# Patient Record
Sex: FEMALE | Race: WHITE | NOT HISPANIC OR LATINO | Employment: OTHER | URBAN - METROPOLITAN AREA
[De-identification: names, ages, dates, MRNs, and addresses within clinical notes are randomized per-mention and may not be internally consistent; named-entity substitution may affect disease eponyms.]

---

## 2017-02-07 DIAGNOSIS — E78.5 HYPERLIPIDEMIA: ICD-10-CM

## 2017-02-13 ENCOUNTER — ALLSCRIPTS OFFICE VISIT (OUTPATIENT)
Dept: OTHER | Facility: OTHER | Age: 62
End: 2017-02-13

## 2017-02-13 LAB — S PYO AG THROAT QL: POSITIVE

## 2017-02-24 ENCOUNTER — ALLSCRIPTS OFFICE VISIT (OUTPATIENT)
Dept: OTHER | Facility: OTHER | Age: 62
End: 2017-02-24

## 2017-03-15 ENCOUNTER — ALLSCRIPTS OFFICE VISIT (OUTPATIENT)
Dept: OTHER | Facility: OTHER | Age: 62
End: 2017-03-15

## 2017-04-05 ENCOUNTER — ALLSCRIPTS OFFICE VISIT (OUTPATIENT)
Dept: OTHER | Facility: OTHER | Age: 62
End: 2017-04-05

## 2017-04-05 DIAGNOSIS — N23 RENAL COLIC: ICD-10-CM

## 2017-04-05 DIAGNOSIS — J02.0 STREPTOCOCCAL PHARYNGITIS: ICD-10-CM

## 2017-04-05 DIAGNOSIS — E04.1 NONTOXIC SINGLE THYROID NODULE: ICD-10-CM

## 2017-04-05 DIAGNOSIS — E78.49 OTHER HYPERLIPIDEMIA: ICD-10-CM

## 2017-04-05 DIAGNOSIS — E87.6 HYPOKALEMIA: ICD-10-CM

## 2017-04-05 DIAGNOSIS — E78.5 HYPERLIPIDEMIA: ICD-10-CM

## 2017-04-05 DIAGNOSIS — H69.93 DISORDER OF BOTH EUSTACHIAN TUBES: ICD-10-CM

## 2017-04-05 LAB — S PYO AG THROAT QL: POSITIVE

## 2017-04-12 ENCOUNTER — GENERIC CONVERSION - ENCOUNTER (OUTPATIENT)
Dept: OTHER | Facility: OTHER | Age: 62
End: 2017-04-12

## 2017-04-13 ENCOUNTER — LAB REQUISITION (OUTPATIENT)
Dept: LAB | Facility: HOSPITAL | Age: 62
End: 2017-04-13
Payer: COMMERCIAL

## 2017-04-13 DIAGNOSIS — J03.90 ACUTE TONSILLITIS: ICD-10-CM

## 2017-04-13 PROCEDURE — 87070 CULTURE OTHR SPECIMN AEROBIC: CPT | Performed by: PHYSICIAN ASSISTANT

## 2017-04-15 LAB — BACTERIA THROAT CULT: NORMAL

## 2017-04-25 ENCOUNTER — GENERIC CONVERSION - ENCOUNTER (OUTPATIENT)
Dept: OTHER | Facility: OTHER | Age: 62
End: 2017-04-25

## 2017-04-27 ENCOUNTER — APPOINTMENT (OUTPATIENT)
Dept: LAB | Facility: CLINIC | Age: 62
End: 2017-04-27
Payer: COMMERCIAL

## 2017-04-27 ENCOUNTER — GENERIC CONVERSION - ENCOUNTER (OUTPATIENT)
Dept: OTHER | Facility: OTHER | Age: 62
End: 2017-04-27

## 2017-04-27 DIAGNOSIS — E78.5 HYPERLIPIDEMIA: ICD-10-CM

## 2017-04-27 DIAGNOSIS — N23 RENAL COLIC: ICD-10-CM

## 2017-04-27 DIAGNOSIS — E78.49 OTHER HYPERLIPIDEMIA: ICD-10-CM

## 2017-04-27 DIAGNOSIS — E04.1 NONTOXIC SINGLE THYROID NODULE: ICD-10-CM

## 2017-04-27 LAB
ALBUMIN SERPL BCP-MCNC: 4 G/DL (ref 3.5–5)
ALP SERPL-CCNC: 63 U/L (ref 46–116)
ALT SERPL W P-5'-P-CCNC: 25 U/L (ref 12–78)
ANION GAP SERPL CALCULATED.3IONS-SCNC: 9 MMOL/L (ref 4–13)
AST SERPL W P-5'-P-CCNC: 14 U/L (ref 5–45)
BASOPHILS # BLD MANUAL: 0 THOUSAND/UL (ref 0–0.1)
BASOPHILS NFR MAR MANUAL: 0 % (ref 0–1)
BILIRUB SERPL-MCNC: 1.39 MG/DL (ref 0.2–1)
BUN SERPL-MCNC: 14 MG/DL (ref 5–25)
CALCIUM SERPL-MCNC: 9.4 MG/DL (ref 8.3–10.1)
CHLORIDE SERPL-SCNC: 101 MMOL/L (ref 100–108)
CHOLEST SERPL-MCNC: 226 MG/DL (ref 50–200)
CO2 SERPL-SCNC: 28 MMOL/L (ref 21–32)
CREAT SERPL-MCNC: 0.82 MG/DL (ref 0.6–1.3)
EOSINOPHIL # BLD MANUAL: 0.07 THOUSAND/UL (ref 0–0.4)
EOSINOPHIL NFR BLD MANUAL: 1 % (ref 0–6)
ERYTHROCYTE [DISTWIDTH] IN BLOOD BY AUTOMATED COUNT: 14 % (ref 11.6–15.1)
GFR SERPL CREATININE-BSD FRML MDRD: >60 ML/MIN/1.73SQ M
GLUCOSE P FAST SERPL-MCNC: 93 MG/DL (ref 65–99)
HCT VFR BLD AUTO: 39.8 % (ref 34.8–46.1)
HDLC SERPL-MCNC: 69 MG/DL (ref 40–60)
HGB BLD-MCNC: 13.2 G/DL (ref 11.5–15.4)
LDLC SERPL CALC-MCNC: 130 MG/DL (ref 0–100)
LYMPHOCYTES # BLD AUTO: 1.85 THOUSAND/UL (ref 0.6–4.47)
LYMPHOCYTES # BLD AUTO: 25 % (ref 14–44)
MCH RBC QN AUTO: 29.7 PG (ref 26.8–34.3)
MCHC RBC AUTO-ENTMCNC: 33.2 G/DL (ref 31.4–37.4)
MCV RBC AUTO: 90 FL (ref 82–98)
MONOCYTES # BLD AUTO: 1.03 THOUSAND/UL (ref 0–1.22)
MONOCYTES NFR BLD: 14 % (ref 4–12)
NEUTROPHILS # BLD MANUAL: 4.43 THOUSAND/UL (ref 1.85–7.62)
NEUTS BAND NFR BLD MANUAL: 1 % (ref 0–8)
NEUTS SEG NFR BLD AUTO: 59 % (ref 43–75)
NRBC BLD AUTO-RTO: 0 /100 WBCS
PLATELET # BLD AUTO: 305 THOUSANDS/UL (ref 149–390)
PLATELET BLD QL SMEAR: ADEQUATE
PMV BLD AUTO: 10.7 FL (ref 8.9–12.7)
POTASSIUM SERPL-SCNC: 3.4 MMOL/L (ref 3.5–5.3)
PROT SERPL-MCNC: 7.7 G/DL (ref 6.4–8.2)
PTH-INTACT SERPL-MCNC: 48.4 PG/ML (ref 14–72)
RBC # BLD AUTO: 4.44 MILLION/UL (ref 3.81–5.12)
RBC MORPH BLD: NORMAL
SODIUM SERPL-SCNC: 138 MMOL/L (ref 136–145)
TRIGL SERPL-MCNC: 133 MG/DL
TSH SERPL DL<=0.05 MIU/L-ACNC: 1.74 UIU/ML (ref 0.36–3.74)
WBC # BLD AUTO: 7.39 THOUSAND/UL (ref 4.31–10.16)

## 2017-04-27 PROCEDURE — 84443 ASSAY THYROID STIM HORMONE: CPT

## 2017-04-27 PROCEDURE — 85027 COMPLETE CBC AUTOMATED: CPT

## 2017-04-27 PROCEDURE — 80053 COMPREHEN METABOLIC PANEL: CPT

## 2017-04-27 PROCEDURE — 36415 COLL VENOUS BLD VENIPUNCTURE: CPT

## 2017-04-27 PROCEDURE — 85007 BL SMEAR W/DIFF WBC COUNT: CPT

## 2017-04-27 PROCEDURE — 80061 LIPID PANEL: CPT

## 2017-04-27 PROCEDURE — 83970 ASSAY OF PARATHORMONE: CPT

## 2017-05-01 ENCOUNTER — ALLSCRIPTS OFFICE VISIT (OUTPATIENT)
Dept: OTHER | Facility: OTHER | Age: 62
End: 2017-05-01

## 2017-05-01 ENCOUNTER — LAB REQUISITION (OUTPATIENT)
Dept: LAB | Facility: HOSPITAL | Age: 62
End: 2017-05-01
Payer: COMMERCIAL

## 2017-05-01 ENCOUNTER — TRANSCRIBE ORDERS (OUTPATIENT)
Dept: ADMINISTRATIVE | Facility: HOSPITAL | Age: 62
End: 2017-05-01

## 2017-05-01 DIAGNOSIS — N20.0 URIC ACID NEPHROLITHIASIS: Primary | ICD-10-CM

## 2017-05-01 DIAGNOSIS — N20.0 CALCULUS OF KIDNEY: ICD-10-CM

## 2017-05-01 DIAGNOSIS — N23 RENAL COLIC: ICD-10-CM

## 2017-05-01 LAB
CLARITY UR: NORMAL
COLOR UR: YELLOW
GLUCOSE (HISTORICAL): NORMAL
HGB UR QL STRIP.AUTO: NORMAL
KETONES UR STRIP-MCNC: NORMAL MG/DL
LEUKOCYTE ESTERASE UR QL STRIP: NORMAL
NITRITE UR QL STRIP: NORMAL
PH UR STRIP.AUTO: 5 [PH]
PROT UR STRIP-MCNC: NORMAL MG/DL
SP GR UR STRIP.AUTO: 1.01

## 2017-05-01 PROCEDURE — 87086 URINE CULTURE/COLONY COUNT: CPT | Performed by: UROLOGY

## 2017-05-02 LAB — BACTERIA UR CULT: NORMAL

## 2017-05-05 ENCOUNTER — GENERIC CONVERSION - ENCOUNTER (OUTPATIENT)
Dept: OTHER | Facility: OTHER | Age: 62
End: 2017-05-05

## 2017-05-05 ENCOUNTER — APPOINTMENT (OUTPATIENT)
Dept: LAB | Facility: CLINIC | Age: 62
End: 2017-05-05
Payer: COMMERCIAL

## 2017-05-05 DIAGNOSIS — E87.6 HYPOKALEMIA: ICD-10-CM

## 2017-05-05 LAB — POTASSIUM SERPL-SCNC: 4.1 MMOL/L (ref 3.5–5.3)

## 2017-05-05 PROCEDURE — 36415 COLL VENOUS BLD VENIPUNCTURE: CPT

## 2017-05-05 PROCEDURE — 84132 ASSAY OF SERUM POTASSIUM: CPT

## 2017-05-08 ENCOUNTER — HOSPITAL ENCOUNTER (OUTPATIENT)
Dept: CT IMAGING | Facility: HOSPITAL | Age: 62
Discharge: HOME | End: 2017-05-08
Attending: UROLOGY
Payer: COMMERCIAL

## 2017-05-08 DIAGNOSIS — N20.0 CALCULUS OF KIDNEY: ICD-10-CM

## 2017-05-08 DIAGNOSIS — N20.0 URIC ACID NEPHROLITHIASIS: ICD-10-CM

## 2017-05-08 DIAGNOSIS — Z12.31 ENCOUNTER FOR SCREENING MAMMOGRAM FOR MALIGNANT NEOPLASM OF BREAST: ICD-10-CM

## 2017-05-08 PROCEDURE — 74176 CT ABD & PELVIS W/O CONTRAST: CPT

## 2017-05-09 ENCOUNTER — ALLSCRIPTS OFFICE VISIT (OUTPATIENT)
Dept: OTHER | Facility: OTHER | Age: 62
End: 2017-05-09

## 2017-06-19 ENCOUNTER — HOSPITAL ENCOUNTER (OUTPATIENT)
Dept: RADIOLOGY | Facility: HOSPITAL | Age: 62
Discharge: HOME | End: 2017-06-19
Attending: ORTHOPAEDIC SURGERY
Payer: COMMERCIAL

## 2017-06-19 ENCOUNTER — ALLSCRIPTS OFFICE VISIT (OUTPATIENT)
Dept: OTHER | Facility: OTHER | Age: 62
End: 2017-06-19

## 2017-06-19 DIAGNOSIS — M54.50 LOW BACK PAIN: ICD-10-CM

## 2017-06-19 PROCEDURE — 72100 X-RAY EXAM L-S SPINE 2/3 VWS: CPT

## 2017-06-23 ENCOUNTER — GENERIC CONVERSION - ENCOUNTER (OUTPATIENT)
Dept: OTHER | Facility: OTHER | Age: 62
End: 2017-06-23

## 2017-06-23 ENCOUNTER — APPOINTMENT (OUTPATIENT)
Dept: PHYSICAL THERAPY | Facility: CLINIC | Age: 62
End: 2017-06-23
Payer: COMMERCIAL

## 2017-06-23 DIAGNOSIS — M54.50 LOW BACK PAIN: ICD-10-CM

## 2017-06-23 PROCEDURE — 97162 PT EVAL MOD COMPLEX 30 MIN: CPT

## 2017-06-27 ENCOUNTER — APPOINTMENT (OUTPATIENT)
Dept: PHYSICAL THERAPY | Facility: CLINIC | Age: 62
End: 2017-06-27
Payer: COMMERCIAL

## 2017-06-27 PROCEDURE — 97110 THERAPEUTIC EXERCISES: CPT

## 2017-06-29 ENCOUNTER — APPOINTMENT (OUTPATIENT)
Dept: PHYSICAL THERAPY | Facility: CLINIC | Age: 62
End: 2017-06-29
Payer: COMMERCIAL

## 2017-06-29 PROCEDURE — 97110 THERAPEUTIC EXERCISES: CPT

## 2017-06-29 PROCEDURE — 97140 MANUAL THERAPY 1/> REGIONS: CPT

## 2017-06-29 PROCEDURE — 97112 NEUROMUSCULAR REEDUCATION: CPT

## 2017-07-05 ENCOUNTER — APPOINTMENT (OUTPATIENT)
Dept: PHYSICAL THERAPY | Facility: CLINIC | Age: 62
End: 2017-07-05
Payer: COMMERCIAL

## 2017-07-05 PROCEDURE — 97150 GROUP THERAPEUTIC PROCEDURES: CPT

## 2017-07-05 PROCEDURE — 97110 THERAPEUTIC EXERCISES: CPT

## 2017-07-05 PROCEDURE — 97140 MANUAL THERAPY 1/> REGIONS: CPT

## 2017-07-07 ENCOUNTER — APPOINTMENT (OUTPATIENT)
Dept: PHYSICAL THERAPY | Facility: CLINIC | Age: 62
End: 2017-07-07
Payer: COMMERCIAL

## 2017-07-07 PROCEDURE — 97112 NEUROMUSCULAR REEDUCATION: CPT

## 2017-07-07 PROCEDURE — 97140 MANUAL THERAPY 1/> REGIONS: CPT

## 2017-07-07 PROCEDURE — 97110 THERAPEUTIC EXERCISES: CPT

## 2017-07-10 ENCOUNTER — APPOINTMENT (OUTPATIENT)
Dept: PHYSICAL THERAPY | Facility: CLINIC | Age: 62
End: 2017-07-10
Payer: COMMERCIAL

## 2017-07-10 PROCEDURE — 97110 THERAPEUTIC EXERCISES: CPT

## 2017-07-10 PROCEDURE — 97140 MANUAL THERAPY 1/> REGIONS: CPT

## 2017-07-13 ENCOUNTER — APPOINTMENT (OUTPATIENT)
Dept: PHYSICAL THERAPY | Facility: CLINIC | Age: 62
End: 2017-07-13
Payer: COMMERCIAL

## 2017-07-13 PROCEDURE — 97010 HOT OR COLD PACKS THERAPY: CPT

## 2017-07-13 PROCEDURE — 97110 THERAPEUTIC EXERCISES: CPT

## 2017-07-13 PROCEDURE — 97140 MANUAL THERAPY 1/> REGIONS: CPT

## 2017-07-17 ENCOUNTER — APPOINTMENT (OUTPATIENT)
Dept: PHYSICAL THERAPY | Facility: CLINIC | Age: 62
End: 2017-07-17
Payer: COMMERCIAL

## 2017-07-17 PROCEDURE — 97140 MANUAL THERAPY 1/> REGIONS: CPT

## 2017-07-17 PROCEDURE — 97110 THERAPEUTIC EXERCISES: CPT

## 2017-07-20 ENCOUNTER — APPOINTMENT (OUTPATIENT)
Dept: PHYSICAL THERAPY | Facility: CLINIC | Age: 62
End: 2017-07-20
Payer: COMMERCIAL

## 2017-07-20 PROCEDURE — 97110 THERAPEUTIC EXERCISES: CPT

## 2017-07-20 PROCEDURE — 97140 MANUAL THERAPY 1/> REGIONS: CPT

## 2017-07-24 ENCOUNTER — GENERIC CONVERSION - ENCOUNTER (OUTPATIENT)
Dept: OTHER | Facility: OTHER | Age: 62
End: 2017-07-24

## 2017-07-24 ENCOUNTER — APPOINTMENT (OUTPATIENT)
Dept: PHYSICAL THERAPY | Facility: CLINIC | Age: 62
End: 2017-07-24
Payer: COMMERCIAL

## 2017-07-24 PROCEDURE — 97110 THERAPEUTIC EXERCISES: CPT

## 2017-07-24 PROCEDURE — 97140 MANUAL THERAPY 1/> REGIONS: CPT

## 2017-07-27 ENCOUNTER — APPOINTMENT (OUTPATIENT)
Dept: PHYSICAL THERAPY | Facility: CLINIC | Age: 62
End: 2017-07-27
Payer: COMMERCIAL

## 2017-07-31 ENCOUNTER — APPOINTMENT (OUTPATIENT)
Dept: PHYSICAL THERAPY | Facility: CLINIC | Age: 62
End: 2017-07-31
Payer: COMMERCIAL

## 2017-07-31 ENCOUNTER — TRANSCRIBE ORDERS (OUTPATIENT)
Dept: ADMINISTRATIVE | Facility: HOSPITAL | Age: 62
End: 2017-07-31

## 2017-07-31 ENCOUNTER — ALLSCRIPTS OFFICE VISIT (OUTPATIENT)
Dept: OTHER | Facility: OTHER | Age: 62
End: 2017-07-31

## 2017-07-31 DIAGNOSIS — M45.1 ANKYLOSING SPONDYLITIS OF OCCIPITO-ATLANTO-AXIAL REGION (HCC): Primary | ICD-10-CM

## 2017-08-08 ENCOUNTER — HOSPITAL ENCOUNTER (OUTPATIENT)
Dept: RADIOLOGY | Facility: HOSPITAL | Age: 62
Discharge: HOME | End: 2017-08-08
Attending: ORTHOPAEDIC SURGERY | Admitting: RADIOLOGY
Payer: COMMERCIAL

## 2017-08-08 DIAGNOSIS — M45.1 ANKYLOSING SPONDYLITIS OF OCCIPITO-ATLANTO-AXIAL REGION (HCC): ICD-10-CM

## 2017-08-08 PROCEDURE — G0260 INJ FOR SACROILIAC JT ANESTH: HCPCS

## 2017-08-08 RX ORDER — LIDOCAINE HYDROCHLORIDE 10 MG/ML
5 INJECTION, SOLUTION EPIDURAL; INFILTRATION; INTRACAUDAL; PERINEURAL ONCE
Status: CANCELLED | OUTPATIENT
Start: 2017-08-08 | End: 2017-08-08

## 2017-08-08 RX ORDER — BUPIVACAINE HYDROCHLORIDE 2.5 MG/ML
20 INJECTION, SOLUTION EPIDURAL; INFILTRATION; INTRACAUDAL ONCE
Status: CANCELLED | OUTPATIENT
Start: 2017-08-08 | End: 2017-08-08

## 2017-08-08 RX ORDER — METHYLPREDNISOLONE ACETATE 80 MG/ML
80 INJECTION, SUSPENSION INTRA-ARTICULAR; INTRALESIONAL; INTRAMUSCULAR; SOFT TISSUE ONCE
Status: CANCELLED | OUTPATIENT
Start: 2017-08-08 | End: 2017-08-08

## 2017-08-17 ENCOUNTER — ALLSCRIPTS OFFICE VISIT (OUTPATIENT)
Dept: OTHER | Facility: OTHER | Age: 62
End: 2017-08-17

## 2017-08-17 LAB — S PYO AG THROAT QL: POSITIVE

## 2017-09-12 ENCOUNTER — GENERIC CONVERSION - ENCOUNTER (OUTPATIENT)
Dept: OTHER | Facility: OTHER | Age: 62
End: 2017-09-12

## 2017-09-14 ENCOUNTER — ALLSCRIPTS OFFICE VISIT (OUTPATIENT)
Dept: OTHER | Facility: OTHER | Age: 62
End: 2017-09-14

## 2017-09-14 ENCOUNTER — TRANSCRIBE ORDERS (OUTPATIENT)
Dept: ADMINISTRATIVE | Facility: HOSPITAL | Age: 62
End: 2017-09-14

## 2017-09-14 DIAGNOSIS — M54.50 LOW BACK PAIN: ICD-10-CM

## 2017-09-14 DIAGNOSIS — G89.29 CHRONIC IDIOPATHIC ANAL PAIN: ICD-10-CM

## 2017-09-14 DIAGNOSIS — K62.89 CHRONIC IDIOPATHIC ANAL PAIN: ICD-10-CM

## 2017-09-14 DIAGNOSIS — M54.50 ACUTE RIGHT-SIDED LOW BACK PAIN WITHOUT SCIATICA: Primary | ICD-10-CM

## 2017-09-26 ENCOUNTER — HOSPITAL ENCOUNTER (OUTPATIENT)
Dept: MRI IMAGING | Facility: HOSPITAL | Age: 62
Discharge: HOME | End: 2017-09-26
Attending: ORTHOPAEDIC SURGERY
Payer: COMMERCIAL

## 2017-09-26 ENCOUNTER — GENERIC CONVERSION - ENCOUNTER (OUTPATIENT)
Dept: OTHER | Facility: OTHER | Age: 62
End: 2017-09-26

## 2017-09-26 DIAGNOSIS — M54.50 LOW BACK PAIN: ICD-10-CM

## 2017-09-26 PROCEDURE — 72148 MRI LUMBAR SPINE W/O DYE: CPT

## 2017-10-11 ENCOUNTER — GENERIC CONVERSION - ENCOUNTER (OUTPATIENT)
Dept: OTHER | Facility: OTHER | Age: 62
End: 2017-10-11

## 2017-11-01 DIAGNOSIS — N39.0 URINARY TRACT INFECTION: ICD-10-CM

## 2017-11-01 DIAGNOSIS — N20.0 CALCULUS OF KIDNEY: ICD-10-CM

## 2017-11-01 DIAGNOSIS — N23 RENAL COLIC: ICD-10-CM

## 2017-11-03 ENCOUNTER — HOSPITAL ENCOUNTER (OUTPATIENT)
Dept: ULTRASOUND IMAGING | Facility: HOSPITAL | Age: 62
Discharge: HOME | End: 2017-11-03
Payer: COMMERCIAL

## 2017-11-03 DIAGNOSIS — N20.0 CALCULUS OF KIDNEY: ICD-10-CM

## 2017-11-03 PROCEDURE — 76770 US EXAM ABDO BACK WALL COMP: CPT

## 2017-11-20 ENCOUNTER — HOSPITAL ENCOUNTER (OUTPATIENT)
Dept: MAMMOGRAPHY | Facility: HOSPITAL | Age: 62
Discharge: HOME | End: 2017-11-20
Attending: OBSTETRICS & GYNECOLOGY
Payer: COMMERCIAL

## 2017-11-20 DIAGNOSIS — Z12.31 ENCOUNTER FOR SCREENING MAMMOGRAM FOR MALIGNANT NEOPLASM OF BREAST: ICD-10-CM

## 2017-11-20 PROCEDURE — G0202 SCR MAMMO BI INCL CAD: HCPCS

## 2017-11-20 PROCEDURE — 77063 BREAST TOMOSYNTHESIS BI: CPT

## 2017-11-21 ENCOUNTER — APPOINTMENT (OUTPATIENT)
Dept: LAB | Facility: HOSPITAL | Age: 62
End: 2017-11-21
Attending: UROLOGY
Payer: COMMERCIAL

## 2017-11-21 ENCOUNTER — ALLSCRIPTS OFFICE VISIT (OUTPATIENT)
Dept: OTHER | Facility: OTHER | Age: 62
End: 2017-11-21

## 2017-11-21 ENCOUNTER — TRANSCRIBE ORDERS (OUTPATIENT)
Dept: ADMINISTRATIVE | Facility: HOSPITAL | Age: 62
End: 2017-11-21

## 2017-11-21 ENCOUNTER — GENERIC CONVERSION - ENCOUNTER (OUTPATIENT)
Dept: OTHER | Facility: OTHER | Age: 62
End: 2017-11-21

## 2017-11-21 DIAGNOSIS — N23 RENAL COLIC: ICD-10-CM

## 2017-11-21 DIAGNOSIS — N39.0 URINARY TRACT INFECTION: ICD-10-CM

## 2017-11-21 DIAGNOSIS — N20.0 CALCULUS OF KIDNEY: ICD-10-CM

## 2017-11-21 DIAGNOSIS — N20.0 CALCULUS OF KIDNEY: Primary | ICD-10-CM

## 2017-11-21 LAB
BACTERIA UR QL AUTO: ABNORMAL /HPF
BILIRUB UR QL STRIP: NEGATIVE
CLARITY UR: ABNORMAL
CLARITY UR: NORMAL
COLOR UR: YELLOW
COLOR UR: YELLOW
GLUCOSE (HISTORICAL): NORMAL
GLUCOSE UR STRIP-MCNC: NEGATIVE MG/DL
HGB UR QL STRIP.AUTO: ABNORMAL
HGB UR QL STRIP.AUTO: NORMAL
HYALINE CASTS #/AREA URNS LPF: ABNORMAL /LPF
KETONES UR STRIP-MCNC: NEGATIVE MG/DL
KETONES UR STRIP-MCNC: NORMAL MG/DL
LEUKOCYTE ESTERASE UR QL STRIP: ABNORMAL
LEUKOCYTE ESTERASE UR QL STRIP: NORMAL
NITRITE UR QL STRIP: NEGATIVE
NITRITE UR QL STRIP: NORMAL
NON-SQ EPI CELLS URNS QL MICRO: ABNORMAL /HPF
PH UR STRIP.AUTO: 6.5 [PH]
PH UR STRIP.AUTO: 6.5 [PH] (ref 4.5–8)
PROT UR STRIP-MCNC: NEGATIVE MG/DL
PROT UR STRIP-MCNC: NORMAL MG/DL
RBC #/AREA URNS AUTO: ABNORMAL /HPF
SP GR UR STRIP.AUTO: 1.01
SP GR UR STRIP.AUTO: 1.02 (ref 1–1.03)
UROBILINOGEN UR QL STRIP.AUTO: 0.2 E.U./DL
WBC #/AREA URNS AUTO: ABNORMAL /HPF

## 2017-11-21 PROCEDURE — 81001 URINALYSIS AUTO W/SCOPE: CPT

## 2017-11-21 PROCEDURE — 87186 SC STD MICRODIL/AGAR DIL: CPT

## 2017-11-21 PROCEDURE — 87077 CULTURE AEROBIC IDENTIFY: CPT

## 2017-11-21 PROCEDURE — 87086 URINE CULTURE/COLONY COUNT: CPT

## 2017-11-23 LAB — BACTERIA UR CULT: ABNORMAL

## 2017-11-27 ENCOUNTER — GENERIC CONVERSION - ENCOUNTER (OUTPATIENT)
Dept: OTHER | Facility: OTHER | Age: 62
End: 2017-11-27

## 2017-11-29 ENCOUNTER — GENERIC CONVERSION - ENCOUNTER (OUTPATIENT)
Dept: OTHER | Facility: OTHER | Age: 62
End: 2017-11-29

## 2018-01-11 NOTE — RESULT NOTES
Message   Matilda- Stable mammogram- repeat in 1 year  Verified Results  MAMMO SCREENING BILATERAL W 3D & CAD [id number] 12:32PM [address] Order Number: [id number]   TW Order Number: [id number]   TW Order Number: [id number]    - Patient Instructions: To schedule this appointment, please contact Central Scheduling at [phone]  Do not wear any perfume, powder, lotion or deodorant on breast or underarm area  Please bring your doctors order, referral (if needed) and insurance information with you on the day of the test  Failure to bring this information may result in this test being rescheduled  Arrive 15 minutes prior to your appointment time to register  On the day of your test, please bring any prior mammogram or breast studies with you that were not performed at a [facility]  Failure to bring prior exams may result in your test needing to be rescheduled  Test Name Result Flag Reference   MAMMO SCREENING BILATERAL W 3D & CAD (Report)     Patient History:   Patient is postmenopausal    Family history of unknown cancer in mother at age 66, breast    cancer in maternal aunt at age 76, unknown cancer in maternal    grandmother at age 76, and breast cancer in sister at age 47  Taking unspecified hormones for 12 years  Patient has never smoked  Patient's BMI is 21 3  Reason for exam: screening (asymptomatic)  Screening     Mammo Screening Bilateral W DBT and CAD: September 22, 2016 -    Check In #: [de-identified]   2D/3D Procedure   3D views: Bilateral MLO view(s) were taken  2D views: Bilateral CC view(s) were taken  Technologist: MICAELA Estrella (MICAELA)(M)   Prior study comparison: July 9, 2015, bilateral 3D yohan mammo,    performed at [facility]  March 26, 2014, mammo    screening bilateral W CAD, performed at [facility]  March 20, 2013, mammo screening bilateral W CAD, performed at    [facility]   March 7, 2012, mammo screening    bilateral W CAD, performed at [facility]  March 4, 2011, mammo diagnostic bilateral W CAD, performed at [facility]  July 21, 2010, mammo diagnostic bilateral W CAD,   performed at [facility]  January 26, 2010, mammo    diagnostic bilateral W CAD, performed at [facility] [place]  January 19, 2010, mammo screening bilateral W CAD,    performed at [facility]  January 16, 2009, mammo    screening bilateral W CAD, performed at [facility]  January 7, 2008, mammo screening bilateral W CAD, performed at    [facility]  November 29, 2006, mammo screening    bilateral W CAD, performed at [facility]  There are scattered fibroglandular densities  Bilateral digital    mammography is performed  No dominant soft tissue mass,    architectural distortion or suspicious calcifications are noted  The skin and nipple contours are within normal limits  Scattered benign appearing calcifications are noted  Left breast   nodule is unchanged  No evidence of malignancy  No significant    changes when compared to prior studies  1  No evidence of malignancy  2  No significant change when compared with the prior study  ASSESSMENT: BiRad:2 - Benign     Recommendation:   Routine screening mammogram of both breasts in 1 year  A reminder letter will be scheduled   8-10% of cancers will be missed on mammography  Management of a    palpable abnormality must be based on clinical grounds  Patients    will be notified of their results via letter from our facility  Accredited by [employer] of [employer] and FDA       Transcription Location: MICAELA Hernández 98: [id number]     Risk Value(s):   Tyrer-Cuzick 10 Year: 5 935%, Tyrer-Cuzick Lifetime: 13 553%,    Myriad Table: 1 5%, HERMINIO 5 Year: 2 9%, NCI Lifetime: 12 8%   Signed by:   Romeo Carrizales MD   9/23/16

## 2018-01-11 NOTE — RESULT NOTES
Verified Results  (1) POTASSIUM [id number] 08:58AM Nereida Seaman    Order Number: [id number]     Test Name Result Flag Reference   POTASSIUM 4 1 mmol/L  3 5-5 3

## 2018-01-11 NOTE — RESULT NOTES
Verified Results  * MRI LUMBAR SPINE WO CONTRAST [id number] 07:16AM Jaskaran Campos Order Number: [id number]   Performing Comments: [facility]   [address] [facility]   [place]   9-26-17 @@ 7:30AM   PLEASE ARRIVE 10-15 MINUTES PRIOR TO TEST  - Patient Instructions: To schedule this appointment, please contact Central Scheduling at [phone]  Test Name Result Flag Reference   MRI LUMBAR SPINE WO CONTRAST (Report)     MRI LUMBAR SPINE WITHOUT CONTRAST     INDICATION: Low back pain  COMPARISON: None  TECHNIQUE: Sagittal T1, sagittal T2, sagittal inversion recovery, axial T1 and axial T2, coronal T2       IMAGE QUALITY: Diagnostic     FINDINGS:     ALIGNMENT: Normal alignment of the lumbar spine  No compression fracture  No spondylolysis or spondylolisthesis  No scoliosis  MARROW SIGNAL: Hemangioma within the L2 vertebral body  DISTAL CORD AND CONUS: Normal size and signal within the distal cord and conus  The conus ends at the L1 level  PARASPINAL SOFT TISSUES: Paraspinal soft tissues are unremarkable  SACRUM: Normal signal within the sacrum  No evidence of insufficiency or stress fracture  LOWER THORACIC DISC SPACES: Normal disc height and signal  No disc herniation, canal stenosis or foraminal narrowing  LUMBAR DISC SPACES:        L1-L2: Normal      L2-L3: Normal      L3-L4: Normal      L4-L5: Mild annular bulging  Slight facet degenerative change with trace effusions  No canal stenosis  No foraminal narrowing  L5-S1: Slight loss of disc height  Mild annular bulging and facet arthropathy  No disc herniation, canal stenosis or foraminal narrowing  IMPRESSION:     Mild noncompressive lumbar degenerative disc disease and facet arthropathy at L4-5 and L5-S1  No disc herniation, canal stenosis or foraminal narrowing         Workstation performed: [id number]     Signed by:   Merlin Cornwall, DO   9/26/17

## 2018-01-12 VITALS
HEART RATE: 76 BPM | TEMPERATURE: 98.4 F | SYSTOLIC BLOOD PRESSURE: 110 MMHG | HEIGHT: 64 IN | DIASTOLIC BLOOD PRESSURE: 74 MMHG | RESPIRATION RATE: 16 BRPM | BODY MASS INDEX: 22.16 KG/M2 | WEIGHT: 129.8 LBS

## 2018-01-13 VITALS
WEIGHT: 133 LBS | HEART RATE: 74 BPM | BODY MASS INDEX: 22.71 KG/M2 | HEIGHT: 64 IN | SYSTOLIC BLOOD PRESSURE: 110 MMHG | DIASTOLIC BLOOD PRESSURE: 78 MMHG

## 2018-01-13 VITALS
DIASTOLIC BLOOD PRESSURE: 90 MMHG | HEIGHT: 63 IN | WEIGHT: 128 LBS | SYSTOLIC BLOOD PRESSURE: 130 MMHG | RESPIRATION RATE: 16 BRPM | TEMPERATURE: 98 F | HEART RATE: 70 BPM | BODY MASS INDEX: 22.68 KG/M2

## 2018-01-13 NOTE — RESULT NOTES
Message   Matilda - DXA is low risk for fracture- repeat in 2 years  Verified Results  * DXA BONE DENSITY SPINE HIP AND PELVIS 22Sep2016 12:33PM Vidya Shaw   TW Order Number: [id number]   TW Order Number: [id number]     Test Name Result Flag Reference   DXA BONE DENSITY SPINE HIP AND PELVIS (Report)     CENTRAL DXA SCAN     CLINICAL HISTORY:  56year old post-menopausal  female risk factors include postmenopausal, estrogen deficiency  TECHNIQUE: Bone densitometry was performed using a [employer]'s W bone densitometer  Regions of interest appear properly placed  There are no obvious fractures or other confounding variables which could limit the study  None     COMPARISON: Follow-up     RESULTS:    LUMBAR SPINE: L1-L4:   BMD 0 839 gm/cm2   T-score below normal, -1 9   Z-score -0 3     LEFT TOTAL HIP:   BMD 0 811 gm/cm2   T-score below normal, -1 1   Z-score 0 0     LEFT FEMORAL NECK:   BMD 0 683 gm/cm2   T-score below normal, -1 5   Z-score -0 1     The Frax score in this patient identifies the risk of a major osteoporotic fracture in the next 10 years at 7 9% and a hip fracture risk of 0 8%  IMPRESSION:   1  Based on the [facility] classification, this study identifies moderate spine and femoral neck osteopenia with mild total hip osteopenia and the patient is considered at current low risk for fracture  2  A daily intake of calcium of at least 1200 mg and vitamin D, 800-1000 IU, as well as weight bearing and muscle strengthening exercise, fall prevention and avoidance of tobacco and excessive alcohol intake as basic preventive measures are recommended  3  Repeat DXA scan on the same equipment in 18-24 months as clinically indicated        WHO CLASSIFICATION:   Normal (a T-score of -1 0 or higher)   Low bone mineral density (a T-score of less than -1 0 but higher than -2 5)   Osteoporosis (a T-score of -2 5 or less)   Severe osteoporosis (a T-score of -2 5 or less with a fragility fracture)      Thank you for allowing us the opportunity to participate in your patient care  The expanded DEXA report will no longer be arriving in your mail  If you desire to view the full report please contact [address] [place] [address] or access the PACS system         Workstation performed: [id number]     Signed by:   Adriana Avalos MD   9/24/16

## 2018-01-13 NOTE — MISCELLANEOUS
Message     Date: 27 Nov 2017 9:54 AM EST, Recorded By: Meera Home For: Odalis Burrell, Self   Phone: [phone] (Mobile Phone)   Returned call from patient, she called for results of urine culture from 11/21/17,  reivewed results and patient history with DR Zach Watts, if patient is asymptomatic do not recommend treating culture with antibiotic  I called and spoke to patient, she is currenlty not having signs of infection, no fever or chills or urinary complaints  Advised culture had some bacteria and because she is asymptomatic it is not recommended to treat with anbiotic   Call if develops any urinary symptoms or concerns  Wendi Nation RN 11/27/17        Active Problems    1  Acute non-recurrent sinusitis, unspecified location (461 9) (J01 90)   2  Anxiety (300 00) (F41 9)   3  Atrophic vaginitis (627 3) (N95 2)   4  Chronic right-sided low back pain without sciatica (724 2,338 29) (M54 5,G89 29)   5  Encounter for routine pelvic examination (V72 31) (Z01 419)   6  Eustachian tube disorder, bilateral (381 9) (H69 93)   7  History of breast cancer in female (V10 3) (Z85 3)   8  History of parotidectomy (V15 29) (Z90 49)   9  Hyperlipidemia (272 4) (E78 5)   10  Hypokalemia (276 8) (E87 6)   11  Insomnia, controlled (780 52) (G47 00)   12  Denied: History of Mental disorder   13  MVP (mitral valve prolapse) (424 0) (I34 1)   14  Nephrolithiasis (592 0) (N20 0)   15  Osteopenia (733 90) (M85 80)   16  Other hyperlipidemia (272 4) (E78 4)   17  Renal colic (748 1) (A77)   18  Sacroiliitis (720 2) (M46 1)   19  Sorethroat (462) (J02 9)   20  Strep pharyngitis (034 0) (J02 0)   21  Denied: History of Substance abuse   22  Thyroid nodule (241 0) (E04 1)   23  UTI (urinary tract infection) (599 0) (N39 0)   24  Visit for screening mammogram (V76 12) (Z12 31)   25  Well adult exam (V70 0) (Z00 00)    Current Meds   1  Centrum Silver Adult 50+ TABS Recorded   2  DiazePAM 5 MG Oral Tablet; 1 PO PRN ANXIETY - DO NOT DRIVE OR TAKE ALCOHOL   WITH THIS MEDICATION; Last Rx:53Hqt4509 Ordered   3  Estrace 0 1 MG/GM Vaginal Cream; APPLY 1 GM WEEKLY; Therapy: 74FVZ4308 to (YRTPLKWU:78SUQ7228)  Requested for: 04GYY3931; Last   NA:98VWG8586 Ordered   4  Probiotic CAPS; Therapy: (Recorded:04May2016) to Recorded   5  Replens GEL; Therapy: (Recorded:04May2016) to Recorded   6  Simvastatin 40 MG Oral Tablet; Take 1 tablet daily  Requested for: 25Apr2017; Last   Rx:63Sxr1611 Ordered   7  Vitamin D 1000 UNIT Oral Tablet Recorded   8  Zolpidem Tartrate 10 MG Oral Tablet; 1/2-1 TAB AT HS FOR INSOMNIA-DO NOT   COMBINE WITH ANY OTHER MEDICATIONS; Therapy: 25Apr2017 to (Evaluate:12Ful9525); Last Rx:25Apr2017 Ordered    Allergies    1  Augmentin TABS   2   [address]    Signatures   Electronically signed by : Kashif Patel MD; Nov 28 2017  8:24AM EST                       (Author)

## 2018-01-14 VITALS
DIASTOLIC BLOOD PRESSURE: 84 MMHG | HEART RATE: 62 BPM | WEIGHT: 131 LBS | SYSTOLIC BLOOD PRESSURE: 131 MMHG | BODY MASS INDEX: 23.21 KG/M2

## 2018-01-14 VITALS
DIASTOLIC BLOOD PRESSURE: 77 MMHG | WEIGHT: 129 LBS | SYSTOLIC BLOOD PRESSURE: 124 MMHG | BODY MASS INDEX: 22.85 KG/M2 | HEART RATE: 72 BPM

## 2018-01-14 VITALS
DIASTOLIC BLOOD PRESSURE: 81 MMHG | HEART RATE: 85 BPM | WEIGHT: 131 LBS | BODY MASS INDEX: 23.21 KG/M2 | HEIGHT: 63 IN | SYSTOLIC BLOOD PRESSURE: 132 MMHG

## 2018-01-14 VITALS
BODY MASS INDEX: 23.21 KG/M2 | DIASTOLIC BLOOD PRESSURE: 82 MMHG | WEIGHT: 131 LBS | SYSTOLIC BLOOD PRESSURE: 148 MMHG | HEIGHT: 63 IN

## 2018-01-14 VITALS
BODY MASS INDEX: 22.5 KG/M2 | WEIGHT: 131.8 LBS | HEART RATE: 72 BPM | SYSTOLIC BLOOD PRESSURE: 134 MMHG | HEIGHT: 64 IN | DIASTOLIC BLOOD PRESSURE: 94 MMHG | RESPIRATION RATE: 18 BRPM | TEMPERATURE: 97.6 F

## 2018-01-15 VITALS
RESPIRATION RATE: 16 BRPM | HEIGHT: 64 IN | DIASTOLIC BLOOD PRESSURE: 78 MMHG | BODY MASS INDEX: 22.07 KG/M2 | SYSTOLIC BLOOD PRESSURE: 122 MMHG | HEART RATE: 86 BPM | WEIGHT: 129.25 LBS | TEMPERATURE: 98.2 F

## 2018-01-15 VITALS
HEIGHT: 64 IN | TEMPERATURE: 98.1 F | BODY MASS INDEX: 22.36 KG/M2 | DIASTOLIC BLOOD PRESSURE: 64 MMHG | RESPIRATION RATE: 16 BRPM | WEIGHT: 131 LBS | SYSTOLIC BLOOD PRESSURE: 110 MMHG | HEART RATE: 76 BPM

## 2018-01-15 NOTE — MISCELLANEOUS
Message     Recorded as Task   Date: 11/29/2017 11:00 AM, Created By: Oscar Chau   Task Name: Follow Up   Assigned To: Usha Juarez   Regarding Patient: Steffen [place], Status: Active   Comment:    Usha Juarez - 29 Nov 2017 11:00 AM     TASK CREATED  Caller: Self; [phone] (Mobile Phone)  Patient left message she has history of kidney stones and she would like to take Biotin for her hair and nails and she would like to know if Biotin can cause kidney stones and if it is ok to take  Usha Juarez - 29 Nov 2017 11:07 AM     TASK EDITED  Reviewed with MYLA Lester,  ok to take Biotin, not aware of any contraindication,  I called and informed patient  Maximo Campuzano RN        Active Problems    1  Acute non-recurrent sinusitis, unspecified location (461 9) (J01 90)   2  Anxiety (300 00) (F41 9)   3  Atrophic vaginitis (627 3) (N95 2)   4  Chronic right-sided low back pain without sciatica (724 2,338 29) (M54 5,G89 29)   5  Encounter for routine pelvic examination (V72 31) (Z01 419)   6  Eustachian tube disorder, bilateral (381 9) (H69 93)   7  History of breast cancer in female (V10 3) (Z85 3)   8  History of parotidectomy (V15 29) (Z90 49)   9  Hyperlipidemia (272 4) (E78 5)   10  Hypokalemia (276 8) (E87 6)   11  Insomnia, controlled (780 52) (G47 00)   12  Denied: History of Mental disorder   13  MVP (mitral valve prolapse) (424 0) (I34 1)   14  Nephrolithiasis (592 0) (N20 0)   15  Osteopenia (733 90) (M85 80)   16  Other hyperlipidemia (272 4) (E78 4)   17  Renal colic (449 5) (X38)   18  Sacroiliitis (720 2) (M46 1)   19  Sorethroat (462) (J02 9)   20  Strep pharyngitis (034 0) (J02 0)   21  Denied: History of Substance abuse   22  Thyroid nodule (241 0) (E04 1)   23  UTI (urinary tract infection) (599 0) (N39 0)   24  Visit for screening mammogram (V76 12) (Z12 31)   25  Well adult exam (V70 0) (Z00 00)    Current Meds   1  Centrum Silver Adult 50+ TABS Recorded   2   DiazePAM 5 MG Oral Tablet; 1 PO PRN ANXIETY - DO NOT DRIVE OR TAKE ALCOHOL   WITH THIS MEDICATION; Last Rx:55Wlm3353 Ordered   3  Estrace 0 1 MG/GM Vaginal Cream; APPLY 1 GM WEEKLY; Therapy: 75OCV5199 to (TVQIFJKB:88RFL0051)  Requested for: 01CUA6436; Last   MELANIE:64MOG3528 Ordered   4  Probiotic CAPS; Therapy: (Recorded:04May2016) to Recorded   5  Replens GEL; Therapy: (Recorded:04May2016) to Recorded   6  Simvastatin 40 MG Oral Tablet; Take 1 tablet daily  Requested for: 25Apr2017; Last   Rx:24Aef3511 Ordered   7  Vitamin D 1000 UNIT Oral Tablet Recorded   8  Zolpidem Tartrate 10 MG Oral Tablet; 1/2-1 TAB AT HS FOR INSOMNIA-DO NOT   COMBINE WITH ANY OTHER MEDICATIONS; Therapy: 25Apr2017 to (Evaluate:93Klm8782); Last Rx:13Qnp0736 Ordered    Allergies    1  Augmentin TABS   2   Macrobid CAPS    Signatures   Electronically signed by : Viri Dupree, [facility]; Nov 29 2017  4:01PM EST                       (Author)

## 2018-01-15 NOTE — RESULT NOTES
Message   PLS SEND RESULT LETTER     Verified Results  (1) CBC/PLT/DIFF [id number] 09:16AM Teri Tamayo     Test Name Result Flag Reference   WBC 5 6 x10E3/uL  3 4-10 8   RBC 4 74 x10E6/uL  3 77-5 28   Hemoglobin 13 7 g/dL  11 1-15 9   Hematocrit 41 5 %  34 0-46  6   MCV 88 fL  79-97   MCH 28 9 pg  26 6-33 0   MCHC 33 0 g/dL  31 5-35 7   RDW 13 9 %  12 3-15 4   Platelets 906 A43J0/PA  150-379   Neutrophils 59 %     Lymphs 29 %     Monocytes 9 %     Eos 2 %     Basos 1 %     Neutrophils (Absolute) 3 3 x10E3/uL  1 4-7 0   Lymphs (Absolute) 1 6 x10E3/uL  0 7-3 1   Monocytes(Absolute) 0 5 x10E3/uL  0 1-0 9   Eos (Absolute) 0 1 x10E3/uL  0 0-0 4   Baso (Absolute) 0 0 x10E3/uL  0 0-0 2   Immature Granulocytes 0 %     Immature Grans (Abs) 0 0 x10E3/uL  0 0-0 1     (1) COMPREHENSIVE METABOLIC PANEL [id number] 09:27ZF Teri Tamayo     Test Name Result Flag Reference   Glucose, Serum 94 mg/dL  65-99   BUN 16 mg/dL  8-27   Creatinine, Serum 0 73 mg/dL  0 57-1 00   eGFR If NonAfricn Am 89 mL/min/1 73  >59   eGFR If Africn Am 102 mL/min/1 73  >59   BUN/Creatinine Ratio 22  11-26   Sodium, Serum 141 mmol/L  134-144   Potassium, Serum 3 8 mmol/L  3 5-5 2   Chloride, Serum 104 mmol/L     Carbon Dioxide, Total 21 mmol/L  18-29   Calcium, Serum 9 7 mg/dL  8 7-10 3   Protein, Total, Serum 7 0 g/dL  6 0-8 5   Albumin, Serum 4 8 g/dL  3 6-4 8   Globulin, Total 2 2 g/dL  1 5-4 5   A/G Ratio 2 2  1 1-2 5   Bilirubin, Total 0 5 mg/dL  0 0-1 2   Alkaline Phosphatase, S 61 IU/L     AST (SGOT) 19 IU/L  0-40   ALT (SGPT) 15 IU/L  0-32     (1) LIPID PANEL, FASTING [id number] 09:16AM Teri Tamayo     Test Name Result Flag Reference   Cholesterol, Total 205 mg/dL H 100-199   Triglycerides 78 mg/dL  0-149   HDL Cholesterol 79 mg/dL  >39   According to ATP-III Guidelines, HDL-C >59 mg/dL is considered a  negative risk factor for CHD     VLDL Cholesterol Raheel 16 mg/dL  5-40   LDL Cholesterol Calc 110 mg/dL H 0-99 T  Chol/HDL Ratio 2 6 ratio units  0 0-4 4   T  Chol/HDL Ratio                                                             Men  Women                                               1/2 Avg  Risk  3 4    3 3                                                   Avg Risk  5 0    4 4                                                2X Avg  Risk  9 6    7 1                                                3X Avg  Risk 23 4   11 0     (1) TSH [id number] 09:16AM Nivia Victoria     Test Name Result Flag Reference   TSH 2 230 uIU/mL  0 450-4 500     [facility]) Yoon Stephanmarks CMP14 Default [id number] 09:16AM Nivia Victoria     Test Name Result Flag Reference   [facility] Default Comment     A hand-written panel/profile was received from your office  In  accordance with the [facility] Ambiguous Test Code Policy dated July 0130, we have completed your order by using the closest currently  or formerly recognized AMA panel  We have assigned Comprehensive  Metabolic Panel (14), Test Code #398767 to this request   If this  is not the testing you wished to receive on this specimen, please  contact the [facility] Client Inquiry/Technical Services Department  to clarify the test order  We appreciate your business  Discussion/Summary   LABS ARE NORMAL

## 2018-01-15 NOTE — RESULT NOTES
Message   Stable labs     Verified Results  (1) COMPREHENSIVE METABOLIC PANEL [id number] 10:17WJ Lewis Breaker     Test Name Result Flag Reference   Glucose, Serum 94 mg/dL  65-99   BUN 21 mg/dL  8-27   Creatinine, Serum 0 79 mg/dL  0 57-1 00   eGFR If NonAfricn Am 80 mL/min/1 73  >59   eGFR If Africn Am 92 mL/min/1 73  >59   BUN/Creatinine Ratio 27 H 11-26   Sodium, Serum 138 mmol/L  136-144   **Effective December 12, 2016 the reference interval**                   for Sodium, Serum will be changing to:                                                             134 - 144   Potassium, Serum 4 2 mmol/L  3 5-5 2   Chloride, Serum 96 mmol/L L    **Effective December 12, 2016 the reference interval**                   for Chloride, Serum will be changing to:                                                              96 - 106   Carbon Dioxide, Total 24 mmol/L  18-29   Calcium, Serum 9 7 mg/dL  8 7-10 3   Protein, Total, Serum 7 2 g/dL  6 0-8 5   Albumin, Serum 4 7 g/dL  3 6-4 8   Globulin, Total 2 5 g/dL  1 5-4 5   A/G Ratio 1 9  1 1-2 5   Bilirubin, Total 0 4 mg/dL  0 0-1 2   Alkaline Phosphatase, S 60 IU/L     AST (SGOT) 17 IU/L  0-40   ALT (SGPT) 11 IU/L  0-32

## 2018-01-16 NOTE — RESULT NOTES
Verified Results  US THYROID 17Jun2016 08:26AM [facility] Order Number: [id number]     Test Name Result Flag Reference   US THYROID (Report)     THYROID ULTRASOUND     INDICATION: Evaluate thyroid nodules  Patient there was prior history of thyroid biopsy with benign results  COMPARISON: None  TECHNIQUE:  Ultrasound of the thyroid was performed with a high frequency linear transducer in transverse and sagittal planes including volumetric imaging sweeps as well as traditional still imaging technique  FINDINGS:   Thyroid parenchyma is diffusely heterogeneous in echotexture with focal nodule(s) as described below  Right gland: 5 0 x 1 7 x 2 1 cm  Right upper pole  1 0 x 1 4 x 1 1 cm  Solid isoechoic nodule  Smooth, well defined margins  No calcifications  Nodule is not taller than it is wide  No priors for comparison  Right lower pole  1 6 x 1 7 x 1 6 cm  Partially cystic nodule with solid hypoechoic component, without eccentric solid nodule  Smooth, well defined margins  No calcifications  Nodule is not taller than it is wide  No priors for comparison  Left gland: 3 9 x 1 1 x 1 3 cm  No discrete left-sided thyroid nodules  Isthmus: 0 3 cm in AP dimension  No discrete isthmic nodules  IMPRESSION:   Diffusely heterogeneous thyroid gland with discrete thyroid nodules described above  No nodule meets current American Thyroid Association criteria for requiring biopsy  If prior outside imaging exists, it would be helpful to compare to assess for    stability  Reference: 2015 American Thyroid Association Management Guidelines for Adult Patients with Thyroid Nodules and Differentiated Thyroid Cancer  Thyroid, Volume 26, Number 1, 2016         Workstation performed: [id number]     Signed by:   Brenna Chavarria MD   6/20/16

## 2018-01-18 NOTE — RESULT NOTES
Verified Results  US THYROID 17Jun2016 08:26AM Kaci Paget Order Number: [id number]     Test Name Result Flag Reference   US THYROID (Report)     [facility]  Previous ultrasound described 3 separate right-sided thyroid nodules, while only 2 are seen today  The right lower pole nodule is unchanged based on prior report measurement is given  It is difficult to determine whether the currently seen right upper   pole nodule has changed because previously 2 different nodules were described in the region  Despite this uncertainty, because the right thyroid upper pole nodule currently does not meet criteria for requiring biopsy, continued follow-up rather than    biopsy is reasonable  Previously described left thyroid midpole nodule is no longer visible  THYROID ULTRASOUND     INDICATION: Evaluate thyroid nodules  Patient there was prior history of thyroid biopsy with benign results  COMPARISON: None  TECHNIQUE:  Ultrasound of the thyroid was performed with a high frequency linear transducer in transverse and sagittal planes including volumetric imaging sweeps as well as traditional still imaging technique  FINDINGS:   Thyroid parenchyma is diffusely heterogeneous in echotexture with focal nodule(s) as described below  Right gland: 5 0 x 1 7 x 2 1 cm  Right upper pole  1 0 x 1 4 x 1 1 cm  Solid isoechoic nodule  Smooth, well defined margins  No calcifications  Nodule is not taller than it is wide  No priors for comparison  Right lower pole  1 6 x 1 7 x 1 6 cm  Partially cystic nodule with solid hypoechoic component, without eccentric solid nodule  Smooth, well defined margins  No calcifications  Nodule is not taller than it is wide  No priors for comparison  Left gland: 3 9 x 1 1 x 1 3 cm  No discrete left-sided thyroid nodules         Isthmus: 0 3 cm in AP dimension  No discrete isthmic nodules  IMPRESSION:   Diffusely heterogeneous thyroid gland with discrete thyroid nodules described above  No nodule meets current American Thyroid Association criteria for requiring biopsy  If prior outside imaging exists, it would be helpful to compare to assess for    stability  Reference: 2015 American Thyroid Association Management Guidelines for Adult Patients with Thyroid Nodules and Differentiated Thyroid Cancer  Thyroid, Volume 26, Number 1, 2016         Workstation performed: [id number]     Signed by:   Brenna Chavarria MD   6/20/16

## 2018-01-18 NOTE — RESULT NOTES
Verified Results  (1) PTH N-TERMINAL (INTACT) 27Apr2017 08:30AM Genevie January TW Order Number: [id number]     Test Name Result Flag Reference   PARATHYROID HORMONE INTACT 48 4 pg/mL  14 0-72 0     (1) COMPREHENSIVE METABOLIC PANEL [id number] 19:68CQ Virginia Murillo Order Number: [id number]     Test Name Result Flag Reference   SODIUM 138 mmol/L  136-145   POTASSIUM 3 4 mmol/L L 3 5-5 3   CHLORIDE 101 mmol/L  100-108   CARBON DIOXIDE 28 mmol/L  21-32   ANION GAP (CALC) 9 mmol/L  4-13   BLOOD UREA NITROGEN 14 mg/dL  5-25   CREATININE 0 82 mg/dL  0 60-1 30   Standardized to IDMS reference method   CALCIUM 9 4 mg/dL  8 3-10 1   BILI, TOTAL 1 39 mg/dL H 0 20-1 00   ALK PHOSPHATAS 63 U/L     ALT (SGPT) 25 U/L  12-78   AST(SGOT) 14 U/L  5-45   ALBUMIN 4 0 g/dL  3 5-5 0   TOTAL PROTEIN 7 7 g/dL  6 4-8 2   eGFR Non-African American      >60 0 ml/min/1 73sq [facility] Disease Education Program recommendations are as follows:  GFR calculation is accurate only with a steady state creatinine  Chronic Kidney disease less than 60 ml/min/1 73 sq  meters  Kidney failure less than 15 ml/min/1 73 sq  meters  GLUCOSE FASTING 93 mg/dL  65-99     (1) CBC/PLT/DIFF 27Apr2017 08:30AM Genevie January TW Order Number: [id number]     Test Name Result Flag Reference   WBC COUNT 7 39 Thousand/uL  4 31-10 16   RBC COUNT 4 44 Million/uL  3 81-5 12   HEMOGLOBIN 13 2 g/dL  11 5-15 4   HEMATOCRIT 39 8 %  34 8-46  1   MCV 90 fL  82-98   MCH 29 7 pg  26 8-34 3   MCHC 33 2 g/dL  31 4-37 4   RDW 14 0 %  11 6-15 1   MPV 10 7 fL  8 9-12 7   PLATELET COUNT 790 Thousands/uL  149-390   nRBC AUTOMATED 0 /100 WBCs     This is an appended report  These results have been appended to a previously verified report     NEUTROPHILS - REL 59 %  43-75   BANDS - REL 1 %  0-8   LYMPHOCYTES - REL 25 %  14-44   MONOCYTES - REL 14 % H 4-12   EOSINOPHILS - REL 1 %  0-6   BASOPHILS - REL 0 %  0-1 NEUTROPHILS ABS 4 43 Thousand/uL  1 85-7 62   LYMPHOTCYTES ABS 1 85 Thousand/uL  0 60-4 47   MONOCYTES ABS 1 03 Thousand/uL  0 00-1 22   EOSINOPHILS ABS 0 07 Thousand/uL  0 00-0 40   BASOPHILS ABS 0 00 Thousand/uL  0 00-0 10   TOTAL COUNTED      RBC MORPHOLOGY Normal     PLT ESTIMATE Adequate  Adequate     (1) LIPID PANEL, FASTING 27Apr2017 08:30AM [facility]    Order Number: [id number]     Test Name Result Flag Reference   CHOLESTEROL 226 mg/dL H    HDL,DIRECT 69 mg/dL H 40-60   Specimen collection should occur prior to Metamizole administration due to the potential for falsely depressed results  LDL CHOLESTEROL CALCULATED 130 mg/dL H 0-100   Triglyceride:         Normal              <150 mg/dl       Borderline High    150-199 mg/dl       High               200-499 mg/dl       Very High          >499 mg/dl  Cholesterol:         Desirable        <200 mg/dl      Borderline High  200-239 mg/dl      High             >239 mg/dl  HDL Cholesterol:        High    >59 mg/dL      Low     <41 mg/dL  LDL CALCULATED:    This screening LDL is a calculated result  It does not have the accuracy of the Direct Measured LDL in the monitoring of patients with hyperlipidemia and/or statin therapy  Direct Measure LDL (EBT254) must be ordered separately in these patients  TRIGLYCERIDES 133 mg/dL  <=150   Specimen collection should occur prior to N-Acetylcysteine or Metamizole administration due to the potential for falsely depressed results  (1) TSH 27Apr2017 08:30AM [employer] Order Number: [id number]     Test Name Result Flag Reference   TSH 1 740 uIU/mL  0 358-3 740   Patients undergoing fluorescein dye angiography may retain small amounts of fluorescein in the body for 48-72 hours post procedure  Samples containing fluorescein can produce falsely depressed TSH values   If the patient had this procedure,a specimen should be resubmitted post fluorescein clearance  The recommended reference ranges for TSH during pregnancy are as follows:  First trimester 0 1 to 2 5 uIU/mL  Second trimester  0 2 to 3 0 uIU/mL  Third trimester 0 3 to 3 0 uIU/m       Plan  Hypokalemia    · (1) POTASSIUM; Status:Active;  Requested for:[id number];

## 2018-01-22 VITALS
HEART RATE: 72 BPM | SYSTOLIC BLOOD PRESSURE: 126 MMHG | HEIGHT: 64 IN | WEIGHT: 136.38 LBS | DIASTOLIC BLOOD PRESSURE: 80 MMHG | BODY MASS INDEX: 23.28 KG/M2

## 2018-01-22 VITALS
RESPIRATION RATE: 18 BRPM | TEMPERATURE: 98.3 F | WEIGHT: 134.6 LBS | DIASTOLIC BLOOD PRESSURE: 84 MMHG | HEART RATE: 78 BPM | HEIGHT: 64 IN | SYSTOLIC BLOOD PRESSURE: 144 MMHG | BODY MASS INDEX: 22.98 KG/M2

## 2018-01-22 VITALS
BODY MASS INDEX: 23.91 KG/M2 | SYSTOLIC BLOOD PRESSURE: 138 MMHG | WEIGHT: 135 LBS | DIASTOLIC BLOOD PRESSURE: 84 MMHG | HEART RATE: 73 BPM

## 2018-01-22 LAB
25(OH)D3 SERPL-MCNC: 24.1 NG/ML (ref 30–100)
A/G RATIO (HISTORICAL): 2 (ref 1.2–2.2)
ALBUMIN SERPL BCP-MCNC: 4.4 G/DL (ref 3.6–4.8)
ALP SERPL-CCNC: 71 IU/L (ref 39–117)
ALT SERPL W P-5'-P-CCNC: 12 IU/L (ref 0–32)
AST SERPL W P-5'-P-CCNC: 19 IU/L (ref 0–40)
BILIRUB SERPL-MCNC: 0.3 MG/DL (ref 0–1.2)
BUN SERPL-MCNC: 20 MG/DL (ref 8–27)
BUN/CREA RATIO (HISTORICAL): 26 (ref 12–28)
CALCIUM SERPL-MCNC: 9.6 MG/DL (ref 8.7–10.3)
CHLORIDE SERPL-SCNC: 103 MMOL/L (ref 96–106)
CHOLEST SERPL-MCNC: 168 MG/DL (ref 100–199)
CHOLEST/HDLC SERPL: 2.7 RATIO UNITS (ref 0–4.4)
CO2 SERPL-SCNC: 22 MMOL/L (ref 18–29)
CREAT SERPL-MCNC: 0.78 MG/DL (ref 0.57–1)
EGFR AFRICAN AMERICAN (HISTORICAL): 93 ML/MIN/1.73
EGFR-AMERICAN CALC (HISTORICAL): 81 ML/MIN/1.73
GLUCOSE SERPL-MCNC: 97 MG/DL (ref 65–99)
HDLC SERPL-MCNC: 63 MG/DL
LDLC SERPL CALC-MCNC: 77 MG/DL (ref 0–99)
POTASSIUM SERPL-SCNC: 3.9 MMOL/L (ref 3.5–5.2)
SODIUM SERPL-SCNC: 143 MMOL/L (ref 134–144)
TOT. GLOBULIN, SERUM (HISTORICAL): 2.2 G/DL (ref 1.5–4.5)
TOTAL PROTEIN (HISTORICAL): 6.6 G/DL (ref 6–8.5)
TRIGL SERPL-MCNC: 141 MG/DL (ref 0–149)
VLDLC SERPL CALC-MCNC: 28 MG/DL (ref 5–40)

## 2018-01-23 ENCOUNTER — GENERIC CONVERSION - ENCOUNTER (OUTPATIENT)
Dept: OTHER | Facility: OTHER | Age: 63
End: 2018-01-23

## 2018-01-23 LAB — TSH SERPL DL<=0.05 MIU/L-ACNC: 1.96 UIU/ML (ref 0.45–4.5)

## 2018-01-24 NOTE — RESULT NOTES
Discussion/Summary   LABS ARE ALL EXCELLENT-  PLEASE ADD VITAMIN D 2000 IU DAILY     Verified Results  (1) COMPREHENSIVE METABOLIC PANEL [id number] 15:39BG [place] [facility]     Test Name Result Flag Reference   Glucose, Serum 97 mg/dL  65-99   BUN 20 mg/dL  8-27   Creatinine, Serum 0 78 mg/dL  0 57-1 00   BUN/Creatinine Ratio 26  12-28   Sodium, Serum 143 mmol/L  134-144   Potassium, Serum 3 9 mmol/L  3 5-5 2   Chloride, Serum 103 mmol/L     Carbon Dioxide, Total 22 mmol/L  18-29   Calcium, Serum 9 6 mg/dL  8 7-10 3   Protein, Total, Serum 6 6 g/dL  6 0-8 5   Albumin, Serum 4 4 g/dL  3 6-4 8   Globulin, Total 2 2 g/dL  1 5-4 5   A/G Ratio 2 0  1 2-2 2   Bilirubin, Total 0 3 mg/dL  0 0-1 2   Alkaline Phosphatase, S 71 IU/L     AST (SGOT) 19 IU/L  0-40   ALT (SGPT) 12 IU/L  0-32   eGFR If NonAfricn Am 81 mL/min/1 73  >59   eGFR If Africn Am 93 mL/min/1 73  >59     (1) LIPID PANEL, FASTING 22Jan2018 08:44AM [place] [facility]     Test Name Result Flag Reference   Cholesterol, Total 168 mg/dL  100-199   Triglycerides 141 mg/dL  0-149   HDL Cholesterol 63 mg/dL  >39   VLDL Cholesterol Raheel 28 mg/dL  5-40   LDL Cholesterol Calc 77 mg/dL  0-99   T  Chol/HDL Ratio 2 7 ratio units  0 0-4 4   T  Chol/HDL Ratio                                                             Men  Women                                               1/2 Avg  Risk  3 4    3 3                                                   Avg Risk  5 0    4 4                                                2X Avg  Risk  9 6    7 1                                                3X Avg  Risk 23 4   11 0     (1) TSH 22Jan2018 08:44AM [place] [facility]     Test Name Result Flag Reference   TSH 1 960 uIU/mL  0 450-4 500     (1) VITAMIN D 25-HYDROXY [id number] 08:44AM [place] [facility]     Test Name Result Flag Reference   Vitamin D, 25-Hydroxy 24 1 ng/mL L 30 0-100 0   Vitamin D deficiency has been defined by the [place] of  Medicine and an Endocrine Society practice guideline as a  level of serum 25-OH vitamin D less than 20 ng/mL (1,2)  The Endocrine Society went on to further define vitamin D  insufficiency as a level between 21 and 29 ng/mL (2)  1  IOM ([place] of Medicine)  2010  Dietary reference     intakes for calcium and D  [address] [place] [address]: The     [employer]  2  Breonna TUCKER, Abi IZQUIERDO, Bhavesh PAN, et al      Evaluation, treatment, and prevention of vitamin D     deficiency: an Endocrine Society clinical practice     guideline  JCEM  2011 Jul; 96(7):1911-30

## 2018-02-15 ENCOUNTER — TELEPHONE (OUTPATIENT)
Dept: UROLOGY | Facility: CLINIC | Age: 63
End: 2018-02-15

## 2018-02-15 NOTE — TELEPHONE ENCOUNTER
OK for patient to take multivitamin containing calcium as long as she done not consume an excessive amount of calcium in her daily diet  OK to take Biotin as well

## 2018-02-22 ENCOUNTER — OFFICE VISIT (OUTPATIENT)
Dept: FAMILY MEDICINE CLINIC | Facility: CLINIC | Age: 63
End: 2018-02-22
Payer: COMMERCIAL

## 2018-02-22 VITALS
RESPIRATION RATE: 16 BRPM | WEIGHT: 126.8 LBS | SYSTOLIC BLOOD PRESSURE: 140 MMHG | HEART RATE: 78 BPM | DIASTOLIC BLOOD PRESSURE: 90 MMHG | BODY MASS INDEX: 21.65 KG/M2 | HEIGHT: 64 IN | TEMPERATURE: 98.5 F

## 2018-02-22 DIAGNOSIS — J20.0 ACUTE BRONCHITIS DUE TO MYCOPLASMA PNEUMONIAE: Primary | ICD-10-CM

## 2018-02-22 PROCEDURE — 99213 OFFICE O/P EST LOW 20 MIN: CPT | Performed by: INTERNAL MEDICINE

## 2018-02-22 RX ORDER — SIMVASTATIN 40 MG
1 TABLET ORAL DAILY
COMMUNITY
End: 2018-05-07 | Stop reason: SDUPTHER

## 2018-02-22 RX ORDER — DIAZEPAM 5 MG/1
5 TABLET ORAL EVERY 6 HOURS PRN
COMMUNITY
End: 2018-05-03 | Stop reason: SDUPTHER

## 2018-02-22 RX ORDER — MULTIVIT-MIN/FA/LYCOPEN/LUTEIN .4-300-25
1 TABLET ORAL DAILY
COMMUNITY

## 2018-02-22 RX ORDER — BENZONATATE 100 MG/1
100 CAPSULE ORAL 3 TIMES DAILY PRN
Qty: 20 CAPSULE | Refills: 0 | Status: SHIPPED | OUTPATIENT
Start: 2018-02-22 | End: 2018-05-03 | Stop reason: ALTCHOICE

## 2018-02-22 RX ORDER — ZOLPIDEM TARTRATE 10 MG/1
10 TABLET ORAL
COMMUNITY
Start: 2017-04-25 | End: 2018-05-03 | Stop reason: SDUPTHER

## 2018-02-22 RX ORDER — AZITHROMYCIN 250 MG/1
TABLET, FILM COATED ORAL
Qty: 6 TABLET | Refills: 0 | Status: SHIPPED | OUTPATIENT
Start: 2018-02-22 | End: 2018-02-27

## 2018-02-22 RX ORDER — ESTRADIOL 0.1 MG/G
CREAM VAGINAL
COMMUNITY
Start: 2016-05-04 | End: 2018-05-15 | Stop reason: SDUPTHER

## 2018-02-22 RX ORDER — GLYCERIN/MIN OIL/POLYCARBOPHIL
GEL WITH APPLICATOR (GRAM) VAGINAL
COMMUNITY

## 2018-02-22 NOTE — PROGRESS NOTES
Assessment/Plan: Karyn Osman is a 57 y o  female with:   Problem List Items Addressed This Visit     None          Subjective:   Karyn Osman is a 57 y o  female who presents today with a chief complaint of Cough    Last week pt was not feeling well on Thursday; She felt poorly as the day went on; Over the last week she has poor appetitie, has couging,  She ahs some chest tightness;n o fever; she is feeling better overall ; She feels weak;  Her appetitie is poor       Review of Systems   Constitutional: Positive for appetite change and fatigue  Negative for chills, diaphoresis, fever and unexpected weight change  HENT: Positive for congestion, postnasal drip and rhinorrhea  Negative for dental problem, drooling, ear discharge, facial swelling, hearing loss, mouth sores, nosebleeds, sinus pain, sinus pressure, sneezing, sore throat, tinnitus, trouble swallowing and voice change  Eyes: Negative  Respiratory: Positive for cough and chest tightness  Negative for shortness of breath, wheezing and stridor  Cardiovascular: Negative  Negative for chest pain and leg swelling  Endocrine: Negative  Negative for cold intolerance and heat intolerance  Genitourinary: Negative  Musculoskeletal: Negative  Allergic/Immunologic: Negative  Neurological: Negative  Hematological: Negative  Psychiatric/Behavioral: Negative  I have reviewed the patient's PMH, Social History, Medication List and Allergies  Objective:  /90 (BP Location: Left arm, Patient Position: Sitting)   Pulse 78   Temp 98 5 °F (36 9 °C) (Tympanic)   Resp 16   Ht 5' 3 75" (1 619 m)   Wt 57 5 kg (126 lb 12 8 oz)   BMI 21 94 kg/m²   Physical Exam   Constitutional: She is oriented to person, place, and time  She appears well-developed and well-nourished  HENT:   Head: Normocephalic and atraumatic  Right Ear: External ear normal    Left Ear: External ear normal    Mouth/Throat: No oropharyngeal exudate  Eyes: Conjunctivae and EOM are normal  Pupils are equal, round, and reactive to light  Right eye exhibits no discharge  Left eye exhibits no discharge  Neck: Normal range of motion  Neck supple  No JVD present  No tracheal deviation present  No thyromegaly present  Cardiovascular: Normal rate, regular rhythm, normal heart sounds and intact distal pulses  Exam reveals no gallop  No murmur heard  Pulmonary/Chest: Effort normal and breath sounds normal  No accessory muscle usage  No respiratory distress  She has no wheezes  She has no rales  Rhonchi left mid lobd; miniminal rhonchi right mid lobe; No rales, no wheeze; Abdominal: Soft  Musculoskeletal: Normal range of motion  Lymphadenopathy:     She has no cervical adenopathy  Neurological: She is alert and oriented to person, place, and time  Skin: Skin is warm and dry  Psychiatric: She has a normal mood and affect  Her behavior is normal  Judgment and thought content normal    Nursing note and vitals reviewed        Allergies   Allergen Reactions    Augmentin  [Amoxicillin-Pot Clavulanate]     Macrobid [Nitrofurantoin]      Active Ambulatory Problems     Diagnosis Date Noted    No Active Ambulatory Problems     Resolved Ambulatory Problems     Diagnosis Date Noted    No Resolved Ambulatory Problems     No Additional Past Medical History         Future Appointments  Date Time Provider Alfred Pollard   5/3/2018 9:00 AM DO RADHA Wiggins  [facility]   11/19/2018 9:00 AM AN US [address]   11/26/2018 8:30 AM Mary Booth MD [facility]

## 2018-02-22 NOTE — PATIENT INSTRUCTIONS
Add mucinex 600 mg bid  Add antibiotics- take with food  Use tessalon perles for cough  Call if no better by Tuesday      Rest

## 2018-03-27 DIAGNOSIS — N39.0 ACUTE UTI: Primary | ICD-10-CM

## 2018-03-27 RX ORDER — SULFAMETHOXAZOLE AND TRIMETHOPRIM 400; 80 MG/1; MG/1
TABLET ORAL
Qty: 10 TABLET | Refills: 0 | Status: SHIPPED | OUTPATIENT
Start: 2018-03-27 | End: 2018-04-06

## 2018-04-10 ENCOUNTER — OFFICE VISIT (OUTPATIENT)
Dept: FAMILY MEDICINE CLINIC | Facility: CLINIC | Age: 63
End: 2018-04-10
Payer: COMMERCIAL

## 2018-04-10 VITALS
DIASTOLIC BLOOD PRESSURE: 64 MMHG | HEIGHT: 63 IN | RESPIRATION RATE: 16 BRPM | BODY MASS INDEX: 24.1 KG/M2 | HEART RATE: 72 BPM | TEMPERATURE: 97.3 F | WEIGHT: 136 LBS | SYSTOLIC BLOOD PRESSURE: 128 MMHG

## 2018-04-10 DIAGNOSIS — J02.9 PHARYNGITIS, UNSPECIFIED ETIOLOGY: Primary | ICD-10-CM

## 2018-04-10 PROBLEM — J20.0 ACUTE BRONCHITIS DUE TO MYCOPLASMA PNEUMONIAE: Status: RESOLVED | Noted: 2018-02-22 | Resolved: 2018-04-10

## 2018-04-10 PROCEDURE — 99213 OFFICE O/P EST LOW 20 MIN: CPT | Performed by: INTERNAL MEDICINE

## 2018-04-10 RX ORDER — AZITHROMYCIN 250 MG/1
TABLET, FILM COATED ORAL
Qty: 6 TABLET | Refills: 0 | Status: SHIPPED | OUTPATIENT
Start: 2018-04-10 | End: 2018-04-14

## 2018-04-10 NOTE — PROGRESS NOTES
ASSESSMENT and PLAN:  Lidia Conroy is a 57 y o  female with:   Problem List Items Addressed This Visit     None          SUBJECTIVE:  Lidia Conroy is a 57 y o  female who presents today with a chief complaint of Earache (left) and Swollen Glands    Patient here for sinus congestion, sore throat on left side; left ear pain and left swollen gland;  no fever, no rash      Review of Systems   Constitutional: Negative  Negative for fever  HENT: Positive for congestion, ear pain, postnasal drip, rhinorrhea, sinus pain, sore throat and trouble swallowing  Negative for dental problem, drooling, ear discharge, facial swelling, hearing loss, mouth sores, nosebleeds, sinus pressure, sneezing and tinnitus  Eyes: Negative  Respiratory: Positive for cough  Cardiovascular: Negative  Gastrointestinal: Negative  Genitourinary: Negative  Musculoskeletal: Negative  Neurological: Negative  Psychiatric/Behavioral: Negative  I have reviewed the patient's PMH, Social History, Medication List and Allergies  OBJECTIVE:  /64 (BP Location: Left arm, Patient Position: Sitting, Cuff Size: Large)   Pulse 72   Temp (!) 97 3 °F (36 3 °C)   Resp 16   Ht 5' 3 4" (1 61 m)   Wt 61 7 kg (136 lb)   BMI 23 79 kg/m²   Physical Exam   Constitutional: She is oriented to person, place, and time  She appears well-developed and well-nourished  HENT:   Head: Normocephalic and atraumatic  Right Ear: External ear normal    Left Ear: External ear normal    Mouth/Throat: Oropharyngeal exudate (some erythema noted on left ) present  Eyes: Conjunctivae and EOM are normal  Pupils are equal, round, and reactive to light  Right eye exhibits no discharge  Left eye exhibits no discharge  Neck: Normal range of motion  Neck supple  No JVD present  No tracheal deviation present  No thyromegaly present  Cardiovascular: Normal rate  Pulmonary/Chest: Effort normal and breath sounds normal  No stridor   No respiratory distress  She has no wheezes  She has no rales  She exhibits no tenderness  Musculoskeletal: Normal range of motion  She exhibits no edema or tenderness  Lymphadenopathy:     She has cervical adenopathy (swollen gland on left ant cervical chain)  Neurological: She is alert and oriented to person, place, and time  She has normal reflexes  Skin: Skin is warm and dry  No rash noted  No erythema  Nursing note and vitals reviewed

## 2018-04-24 ENCOUNTER — TRANSCRIBE ORDERS (OUTPATIENT)
Dept: ADMINISTRATIVE | Facility: HOSPITAL | Age: 63
End: 2018-04-24

## 2018-04-24 DIAGNOSIS — E04.2 MULTIPLE THYROID NODULES: Primary | ICD-10-CM

## 2018-04-30 ENCOUNTER — HOSPITAL ENCOUNTER (OUTPATIENT)
Dept: ULTRASOUND IMAGING | Facility: HOSPITAL | Age: 63
Discharge: HOME | End: 2018-04-30
Attending: OTOLARYNGOLOGY
Payer: COMMERCIAL

## 2018-04-30 DIAGNOSIS — E04.2 MULTIPLE THYROID NODULES: ICD-10-CM

## 2018-04-30 PROCEDURE — 76536 US EXAM OF HEAD AND NECK: CPT

## 2018-05-03 ENCOUNTER — OFFICE VISIT (OUTPATIENT)
Dept: FAMILY MEDICINE CLINIC | Facility: CLINIC | Age: 63
End: 2018-05-03
Payer: COMMERCIAL

## 2018-05-03 VITALS
WEIGHT: 136 LBS | HEIGHT: 63 IN | HEART RATE: 80 BPM | BODY MASS INDEX: 24.1 KG/M2 | RESPIRATION RATE: 16 BRPM | DIASTOLIC BLOOD PRESSURE: 82 MMHG | TEMPERATURE: 98.7 F | SYSTOLIC BLOOD PRESSURE: 134 MMHG

## 2018-05-03 DIAGNOSIS — Z13.6 SCREENING FOR CARDIOVASCULAR CONDITION: ICD-10-CM

## 2018-05-03 DIAGNOSIS — R94.31 ABNORMAL EKG: ICD-10-CM

## 2018-05-03 DIAGNOSIS — Z13.6 SCREENING FOR AAA (ABDOMINAL AORTIC ANEURYSM): ICD-10-CM

## 2018-05-03 DIAGNOSIS — G47.9 SLEEP DISTURBANCE: ICD-10-CM

## 2018-05-03 DIAGNOSIS — Z00.00 ANNUAL PHYSICAL EXAM: Primary | ICD-10-CM

## 2018-05-03 DIAGNOSIS — F41.1 GAD (GENERALIZED ANXIETY DISORDER): ICD-10-CM

## 2018-05-03 DIAGNOSIS — R00.2 PALPITATIONS: ICD-10-CM

## 2018-05-03 PROCEDURE — 93000 ELECTROCARDIOGRAM COMPLETE: CPT | Performed by: INTERNAL MEDICINE

## 2018-05-03 PROCEDURE — 99214 OFFICE O/P EST MOD 30 MIN: CPT | Performed by: INTERNAL MEDICINE

## 2018-05-03 PROCEDURE — 3725F SCREEN DEPRESSION PERFORMED: CPT | Performed by: INTERNAL MEDICINE

## 2018-05-03 PROCEDURE — 99396 PREV VISIT EST AGE 40-64: CPT | Performed by: INTERNAL MEDICINE

## 2018-05-03 RX ORDER — SULFAMETHOXAZOLE AND TRIMETHOPRIM 400; 80 MG/1; MG/1
1 TABLET ORAL AS NEEDED
COMMUNITY
End: 2018-06-02

## 2018-05-03 RX ORDER — ZOLPIDEM TARTRATE 10 MG/1
10 TABLET ORAL
Qty: 30 TABLET | Refills: 0 | Status: SHIPPED | OUTPATIENT
Start: 2018-05-03 | End: 2018-11-29 | Stop reason: SDUPTHER

## 2018-05-03 RX ORDER — DIAZEPAM 5 MG/1
TABLET ORAL
Qty: 30 TABLET | Refills: 0 | Status: SHIPPED | OUTPATIENT
Start: 2018-05-03 | End: 2018-11-29 | Stop reason: SDUPTHER

## 2018-05-03 NOTE — PROGRESS NOTES
ASSESSMENT and PLAN:  Edwige Rausch is a 57 y o  female with:   Problem List Items Addressed This Visit     Sleep disturbance    Relevant Medications    zolpidem (AMBIEN) 10 mg tablet    Other Relevant Orders    Vitamin D 25 hydroxy    Comprehensive metabolic panel    Screening for AAA (abdominal aortic aneurysm) - Primary    Relevant Orders    US abdominal aorta screening aaa    Palpitations    Relevant Orders    Echo complete with contrast if indicated    TSH, 3rd generation    Comprehensive metabolic panel    Ambulatory referral to Cardiology    Screening for cardiovascular condition    Relevant Orders    Lipid panel    PROSPER (generalized anxiety disorder)    Relevant Medications    zolpidem (AMBIEN) 10 mg tablet    diazepam (VALIUM) 5 mg tablet      Other Visit Diagnoses     Abnormal EKG        ekg hshowed change- in avf - refer to cardiology due to palpitations and recent chest discomfort  check echo  follow up with cardiolgoy    Relevant Orders    Ambulatory referral to Cardiology          SUBJECTIVE:  Edwige Rausch is a 57 y o  female who presents today with a chief complaint of Well Check    Patient here for annual exam  She occasionally gets skiipping of her heart and occasional clicks in her heart  In the past she saw cardiology  Last week she had some chest discomfort- indigestion0- woke her up- lasted 40 min; No sweating, no syncope or presyncope- resolved on its own; no exertioanl cp  Pt has been on buspar=- uses diazepam prn after supper to relax-  She occasionally uses zolpidem for sleep- does nto combine them  Her colonoscopy was done 2015- due in 10 year        Review of Systems   Constitutional: Negative  HENT: Negative  Eyes: Negative  Respiratory: Positive for chest tightness  Negative for apnea, cough, choking, shortness of breath, wheezing and stridor  Cardiovascular: Positive for palpitations (occasional palpitations;  feels strange; )  Negative for chest pain and leg swelling  Gastrointestinal: Negative  Endocrine: Negative  Genitourinary: Negative  Musculoskeletal: Negative  Skin: Negative  Allergic/Immunologic: Negative  Neurological: Negative  Hematological: Negative  Psychiatric/Behavioral: Negative  EKG: normal EKG, normal sinus rhythm, unchanged from previous tracings, normal sinus rhythm  With septal infarct-  q in avl  -   I have reviewed the patient's PMH, Social History, Medication List and Allergies  OBJECTIVE:  /82   Pulse 80   Temp 98 7 °F (37 1 °C)   Resp 16   Ht 5' 3 4" (1 61 m)   Wt 61 7 kg (136 lb)   BMI 23 79 kg/m²   Physical Exam   Constitutional: She is oriented to person, place, and time  She appears well-developed and well-nourished  No distress  HENT:   Head: Normocephalic and atraumatic  Right Ear: External ear normal    Left Ear: External ear normal    Nose: Nose normal    Mouth/Throat: Oropharynx is clear and moist    Eyes: Conjunctivae and EOM are normal  Pupils are equal, round, and reactive to light  Right eye exhibits no discharge  Left eye exhibits no discharge  Neck: Normal range of motion  Neck supple  No JVD present  No tracheal deviation present  No thyromegaly present  Cardiovascular: Normal rate, regular rhythm, normal heart sounds and intact distal pulses  Pulmonary/Chest: Effort normal and breath sounds normal  No respiratory distress  She has no wheezes  She has no rales  Abdominal: Soft  Bowel sounds are normal  She exhibits no distension  There is no tenderness  There is no rebound  Musculoskeletal: Normal range of motion  She exhibits no edema, tenderness or deformity  Neurological: She is alert and oriented to person, place, and time  She has normal reflexes  Skin: Skin is warm and dry  She is not diaphoretic  Psychiatric: She has a normal mood and affect  Her behavior is normal  Judgment and thought content normal    Nursing note and vitals reviewed

## 2018-05-07 ENCOUNTER — HOSPITAL ENCOUNTER (OUTPATIENT)
Dept: NON INVASIVE DIAGNOSTICS | Facility: CLINIC | Age: 63
Discharge: HOME | End: 2018-05-07
Payer: COMMERCIAL

## 2018-05-07 DIAGNOSIS — R00.2 PALPITATIONS: ICD-10-CM

## 2018-05-07 DIAGNOSIS — E78.01 FAMILIAL HYPERCHOLESTEROLEMIA: Primary | ICD-10-CM

## 2018-05-07 PROCEDURE — 93306 TTE W/DOPPLER COMPLETE: CPT | Performed by: INTERNAL MEDICINE

## 2018-05-07 PROCEDURE — 93306 TTE W/DOPPLER COMPLETE: CPT

## 2018-05-07 RX ORDER — SIMVASTATIN 40 MG
TABLET ORAL
Qty: 90 TABLET | Refills: 0 | Status: SHIPPED | OUTPATIENT
Start: 2018-05-07 | End: 2019-01-28 | Stop reason: SDUPTHER

## 2018-05-10 ENCOUNTER — TELEPHONE (OUTPATIENT)
Dept: FAMILY MEDICINE CLINIC | Facility: CLINIC | Age: 63
End: 2018-05-10

## 2018-05-10 NOTE — TELEPHONE ENCOUNTER
Pt called she did receive the results letter from her Echocardiogram  She wanted to know your intake on her EKG if you did or did not see if she had a heart attack in the past  She stated you mentioned something during her visit with you  She would like a call back    [phone]

## 2018-05-10 NOTE — TELEPHONE ENCOUNTER
There were very minor changes- we call them stt wave changes-  But she has had palpitations so I want her seen by cardiology for safety  It is probably normal variant

## 2018-05-14 ENCOUNTER — HOSPITAL ENCOUNTER (OUTPATIENT)
Dept: ULTRASOUND IMAGING | Facility: HOSPITAL | Age: 63
Discharge: HOME | End: 2018-05-14
Payer: COMMERCIAL

## 2018-05-14 DIAGNOSIS — Z13.6 SCREENING FOR AAA (ABDOMINAL AORTIC ANEURYSM): ICD-10-CM

## 2018-05-14 PROCEDURE — 76706 US ABDL AORTA SCREEN AAA: CPT

## 2018-05-15 ENCOUNTER — ANNUAL EXAM (OUTPATIENT)
Dept: GYNECOLOGY | Facility: CLINIC | Age: 63
End: 2018-05-15
Payer: COMMERCIAL

## 2018-05-15 VITALS
DIASTOLIC BLOOD PRESSURE: 80 MMHG | BODY MASS INDEX: 23.05 KG/M2 | SYSTOLIC BLOOD PRESSURE: 126 MMHG | HEIGHT: 64 IN | WEIGHT: 135 LBS

## 2018-05-15 DIAGNOSIS — Z12.31 ENCOUNTER FOR SCREENING MAMMOGRAM FOR MALIGNANT NEOPLASM OF BREAST: Primary | ICD-10-CM

## 2018-05-15 DIAGNOSIS — M85.80 OSTEOPENIA, UNSPECIFIED LOCATION: ICD-10-CM

## 2018-05-15 DIAGNOSIS — N95.2 ATROPHIC VAGINITIS: ICD-10-CM

## 2018-05-15 DIAGNOSIS — Z01.419 ENCOUNTER FOR GYNECOLOGICAL EXAMINATION WITHOUT ABNORMAL FINDING: ICD-10-CM

## 2018-05-15 PROCEDURE — 99396 PREV VISIT EST AGE 40-64: CPT | Performed by: OBSTETRICS & GYNECOLOGY

## 2018-05-15 RX ORDER — ESTRADIOL 0.1 MG/G
1 CREAM VAGINAL 2 TIMES WEEKLY
Qty: 42.5 G | Refills: 3 | Status: SHIPPED | OUTPATIENT
Start: 2018-05-17 | End: 2019-05-28 | Stop reason: SDUPTHER

## 2018-05-15 NOTE — PROGRESS NOTES
Assessment/Plan:       Diagnoses and all orders for this visit:    Encounter for screening mammogram for malignant neoplasm of breast  -     Mammo screening bilateral w 3d & cad; Future    Osteopenia, unspecified location  -     DXA bone density spine hip and pelvis; Future    Atrophic vaginitis  -     estradiol (ESTRACE VAGINAL) 0 1 mg/g vaginal cream; Insert 1 g into the vagina 2 (two) times a week x 2 months then decrease to 0nce weekly    Encounter for gynecological examination without abnormal finding        Subjective:      Patient ID: Lidia Conroy is a 57 y o  female  No c/o  On estrace weekly for atrophy  Still some vaginal dryness  Prior [address] [place] [address]; BSO via laparotomy 2001    Osteopenia on last DEXA 9/16    HPI    The following portions of the patient's history were reviewed and updated as appropriate: allergies, current medications, past family history, past medical history, past social history, past surgical history and problem list     Review of Systems   Constitutional: Negative  Gastrointestinal: Negative  Genitourinary: Negative  Objective:      /80   Ht 5' 3 7" (1 618 m)   Wt 61 2 kg (135 lb)   BMI 23 39 kg/m²          Physical Exam   Constitutional: She appears well-developed and well-nourished  Neck: Normal range of motion  Neck supple  Cardiovascular: Normal rate, regular rhythm and normal heart sounds  Pulmonary/Chest: Effort normal and breath sounds normal  Right breast exhibits no inverted nipple, no mass, no nipple discharge, no skin change and no tenderness  Left breast exhibits no inverted nipple, no mass, no nipple discharge, no skin change and no tenderness  Abdominal: Soft  She exhibits no distension and no mass  There is no tenderness  Hernia confirmed negative in the right inguinal area and confirmed negative in the left inguinal area  Genitourinary: There is no rash or lesion on the right labia  There is no rash or lesion on the left labia   Right adnexum displays no mass, no tenderness and no fullness  Left adnexum displays no mass, no tenderness and no fullness  No bleeding in the vagina  Genitourinary Comments: Prior LONA; BSO    Atrophic changes   Lymphadenopathy:        Right: No inguinal adenopathy present  Left: No inguinal adenopathy present

## 2018-05-17 LAB
25(OH)D3+25(OH)D2 SERPL-MCNC: 28.7 NG/ML (ref 30–100)
ALBUMIN SERPL-MCNC: 4.6 G/DL (ref 3.6–4.8)
ALBUMIN/GLOB SERPL: 2.1 {RATIO} (ref 1.2–2.2)
ALP SERPL-CCNC: 60 IU/L (ref 39–117)
ALT SERPL-CCNC: 13 IU/L (ref 0–32)
AST SERPL-CCNC: 19 IU/L (ref 0–40)
BILIRUB SERPL-MCNC: 0.6 MG/DL (ref 0–1.2)
BUN SERPL-MCNC: 14 MG/DL (ref 8–27)
BUN/CREAT SERPL: 16 (ref 12–28)
CALCIUM SERPL-MCNC: 9.7 MG/DL (ref 8.7–10.3)
CHLORIDE SERPL-SCNC: 99 MMOL/L (ref 96–106)
CHOLEST SERPL-MCNC: 203 MG/DL (ref 100–199)
CHOLEST/HDLC SERPL: 3 RATIO (ref 0–4.4)
CO2 SERPL-SCNC: 24 MMOL/L (ref 18–29)
CREAT SERPL-MCNC: 0.88 MG/DL (ref 0.57–1)
GLOBULIN SER-MCNC: 2.2 G/DL (ref 1.5–4.5)
GLUCOSE SERPL-MCNC: 91 MG/DL (ref 65–99)
HDLC SERPL-MCNC: 68 MG/DL
LDLC SERPL CALC-MCNC: 118 MG/DL (ref 0–99)
POTASSIUM SERPL-SCNC: 4.1 MMOL/L (ref 3.5–5.2)
PROT SERPL-MCNC: 6.8 G/DL (ref 6–8.5)
SL AMB EGFR AFRICAN AMERICAN: 80 ML/MIN/1.73
SL AMB EGFR NON AFRICAN AMERICAN: 70 ML/MIN/1.73
SL AMB VLDL CHOLESTEROL CALC: 17 MG/DL (ref 5–40)
SODIUM SERPL-SCNC: 138 MMOL/L (ref 134–144)
TRIGL SERPL-MCNC: 87 MG/DL (ref 0–149)
TSH SERPL DL<=0.005 MIU/L-ACNC: 2.18 UIU/ML (ref 0.45–4.5)

## 2018-05-29 ENCOUNTER — OFFICE VISIT (OUTPATIENT)
Dept: CARDIOLOGY CLINIC | Facility: CLINIC | Age: 63
End: 2018-05-29
Payer: COMMERCIAL

## 2018-05-29 VITALS
DIASTOLIC BLOOD PRESSURE: 82 MMHG | HEIGHT: 63 IN | OXYGEN SATURATION: 98 % | WEIGHT: 134.4 LBS | HEART RATE: 76 BPM | BODY MASS INDEX: 23.81 KG/M2 | SYSTOLIC BLOOD PRESSURE: 142 MMHG

## 2018-05-29 DIAGNOSIS — R00.2 PALPITATIONS: Primary | ICD-10-CM

## 2018-05-29 DIAGNOSIS — R94.31 ABNORMAL EKG: ICD-10-CM

## 2018-05-29 PROCEDURE — 99204 OFFICE O/P NEW MOD 45 MIN: CPT | Performed by: INTERNAL MEDICINE

## 2018-05-29 PROCEDURE — 93000 ELECTROCARDIOGRAM COMPLETE: CPT | Performed by: INTERNAL MEDICINE

## 2018-05-29 NOTE — PROGRESS NOTES
Cardiology   Carlos A Hawk 57 y o  female MRN: [id number]        Impression:  1  Abnormal ECG - ECG now normal   No evidence of myocardial infarction  2  Dyslipidemia - on statin  3  Mitral regurgitation - mild to moderate  Will need to follow over time  4  Palpitations - likely premature atrial or ventricular contractions  Recommendations:  1  Continue current medications  2  Follow up in one year  HPI: Carlos A Hawk is a 57y o  year old female with a history of dyslipidemia who presents for evaluation of abnormal ECG  Patient was seen by PCP - had some chest pain/indigestion like symptoms  Has some chronic dyspnea on exertion, but no change in symptoms  ECG  reported a change in aVF  No change in exertional symptoms  Has had a known murmur  Echocardiogram demonstrated normal LV systolic function with mild to moderate mitral regurgitation  Has rare palpitations  Review of Systems   Constitutional: Negative  HENT: Negative  Eyes: Negative  Respiratory: Negative for chest tightness and shortness of breath  Cardiovascular: Negative for chest pain, palpitations and leg swelling  Gastrointestinal: Negative  Endocrine: Negative  Genitourinary: Negative  Musculoskeletal: Negative  Skin: Negative  Allergic/Immunologic: Negative  Neurological: Negative  Hematological: Negative  Psychiatric/Behavioral: Negative  All other systems reviewed and are negative          Past Medical History:   Diagnosis Date    Allergic      Past Surgical History:   Procedure Laterality Date    CAROTID ARTERY ANGIOPLASTY      CHOLECYSTECTOMY      HYSTERECTOMY N/A     uterus removed    LEG SURGERY Left     TONSILLECTOMY       History   Alcohol Use No     History   Drug Use No     History   Smoking Status    Never Smoker   Smokeless Tobacco    Never Used     Family History   Problem Relation Age of Onset    Kidney cancer Mother     Aneurysm Father      abdominal aortic aneurysm    Breast cancer Sister     Breast cancer Maternal Grandmother     Breast cancer Maternal Aunt     Stomach cancer Paternal Uncle     Hypertension Maternal Grandfather     Diabetes Maternal Grandfather     Sudden death Maternal Grandfather      cardiac    Coronary artery disease Neg Hx     Stroke Neg Hx     Arthritis Neg Hx     Hyperlipidemia Neg Hx        Allergies: Allergies   Allergen Reactions    Augmentin [Amoxicillin-Pot Clavulanate] GI Intolerance    Macrobid [Nitrofurantoin] GI Intolerance       Medications:     Current Outpatient Prescriptions:     cholecalciferol (VITAMIN D3) 1,000 units tablet, Take 1,000 Units by mouth daily  , Disp: , Rfl:     diazepam (VALIUM) 5 mg tablet, 1 po after supper for anxiety- no driving with this, no alcohol with this    Do not combine with zolpidem, Disp: 30 tablet, Rfl: 0    estradiol (ESTRACE VAGINAL) 0 1 mg/g vaginal cream, Insert 1 g into the vagina 2 (two) times a week, Disp: 42 5 g, Rfl: 3    Multiple Vitamins-Minerals (CENTRUM SILVER ADULT 50+) TABS, Take 1 tablet by mouth daily  , Disp: , Rfl:     Probiotic Product (PROBIOTIC COLON SUPPORT) CAPS, Take 1 capsule by mouth daily  , Disp: , Rfl:     simvastatin (ZOCOR) 40 mg tablet, TAKE ONE TABLET BY MOUTH EVERY DAY, Disp: 90 tablet, Rfl: 0    sulfamethoxazole-trimethoprim (BACTRIM) 400-80 mg per tablet, Take 1 tablet by mouth as needed, Disp: , Rfl:     Vaginal Lubricant (REPLENS) GEL, Insert into the vagina, Disp: , Rfl:     zolpidem (AMBIEN) 10 mg tablet, Take 1 tablet (10 mg total) by mouth daily at bedtime as needed for sleep, Disp: 30 tablet, Rfl: 0      Wt Readings from Last 3 Encounters:   05/29/18 61 kg (134 lb 6 4 oz)   05/15/18 61 2 kg (135 lb)   05/03/18 61 7 kg (136 lb)     Temp Readings from Last 3 Encounters:   05/03/18 98 7 °F (37 1 °C)   04/10/18 (!) 97 3 °F (36 3 °C)   02/22/18 98 5 °F (36 9 °C) (Tympanic)     BP Readings from Last 3 Encounters:   05/29/18 142/82 05/15/18 126/80   18 134/82     Pulse Readings from Last 3 Encounters:   18 76   18 80   04/10/18 72         Physical Exam   Constitutional: She is oriented to person, place, and time  She appears well-developed  HENT:   Head: Atraumatic  Eyes: EOM are normal    Neck: Normal range of motion  Cardiovascular: Normal rate, regular rhythm and normal heart sounds  Exam reveals no gallop and no friction rub  No murmur heard  Pulmonary/Chest: Effort normal and breath sounds normal  No respiratory distress  She has no wheezes  She has no rales  Abdominal: Soft  Musculoskeletal: Normal range of motion  Neurological: She is alert and oriented to person, place, and time  Skin: Skin is warm and dry  Psychiatric: She has a normal mood and affect           Laboratory Studies:  CMP:  Lab Results   Component Value Date     2018    K 3 9 2018     2018    CO2 22 2018    ANIONGAP 9 2017    BUN 14 2018    CREATININE 0 88 2018    GLUCOSE 97 2018    AST 19 2018    ALT 12 2018    BILITOT 0 3 2018    EGFR >60 0 2017       Lipid Profile:   Lab Results   Component Value Date    CHOL 168 2018     Lab Results   Component Value Date    HDL 63 2018     Lab Results   Component Value Date    LDLCALC 77 2018     Lab Results   Component Value Date    TRIG 87 2018       Cardiac testing:   EKG reviewed personally: NSR 70 Nml  Results for orders placed during the hospital encounter of 18   Echo complete with contrast if indicated    Narrative [facility] [address]  [facility] [address]  [phone]    Transthoracic Echocardiogram  2D, M-mode, Doppler, and Color Doppler    Study date:  07-May-2018    Patient: Margi Mccracken  MR number: [id number]  Account number: [de-identified]  : 1955  Age: 57 years  Gender: Female  Status: Outpatient  Location: [address] [place] [address] [facility] [place]  Height: 63 in  Weight: 136 lb  BP: 134/ 82 mmHg    Indications: Palpitations    Diagnoses: R00 2 - Palpitations    Sonographer:  FER Magdaleno, RDCS  Referring Physician:  Jessica Yanez DO  Group:  [facility]  cc: Jasper Felix MD  Sonographer:  MATTHEW Lemon  Interpreting Physician:  Jun Garrett DO    SUMMARY    LEFT VENTRICLE:  Systolic function was normal  Ejection fraction was estimated to be 60 %  There were no regional wall motion abnormalities  MITRAL VALVE:  There was mild to moderate regurgitation  Regurgitation grade was 1-2+ on a scale of 0 to 4+  TRICUSPID VALVE:  There was trace regurgitation  HISTORY: PRIOR HISTORY: Palpitations; Abnormal EKG; Recent chest discomfort    PROCEDURE: The study was performed in the [address]  This was a routine study  The transthoracic approach was used  The study included complete 2D imaging, M-mode, complete spectral Doppler, and color Doppler  The  heart rate was 64 bpm, at the start of the study  Images were obtained from the parasternal, apical, subcostal, and suprasternal notch acoustic windows  Image quality was adequate  LEFT VENTRICLE: Size was normal  Systolic function was normal  Ejection fraction was estimated to be 60 %  There were no regional wall motion abnormalities  Wall thickness was normal  No evidence of apical thrombus  DOPPLER: Left  ventricular diastolic function parameters were normal     RIGHT VENTRICLE: The size was normal  Systolic function was normal  Wall thickness was normal     LEFT ATRIUM: Size was normal     RIGHT ATRIUM: Size was normal     MITRAL VALVE: Valve structure was normal  There was mild thickening  There was normal leaflet separation  DOPPLER: The transmitral velocity was within the normal range  There was no evidence for stenosis  There was mild to moderate  regurgitation   Regurgitation grade was 1-2+ on a scale of 0 to 4+     AORTIC VALVE: The valve was trileaflet  Leaflets exhibited normal thickness and normal cuspal separation  DOPPLER: Transaortic velocity was within the normal range  There was no evidence for stenosis  There was no significant  regurgitation  TRICUSPID VALVE: The valve structure was normal  There was normal leaflet separation  DOPPLER: The transtricuspid velocity was within the normal range  There was no evidence for stenosis  There was trace regurgitation  The tricuspid jet  envelope definition was inadequate for estimation of RV systolic pressure  There are no indirect findings (abnormal RV volume or geometry, altered pulmonary flow velocity profile, or leftward septal displacement) which would suggest  moderate or severe pulmonary hypertension  PULMONIC VALVE: Leaflets exhibited normal thickness, no calcification, and normal cuspal separation  DOPPLER: The transpulmonic velocity was within the normal range  There was no significant regurgitation  PERICARDIUM: There was no pericardial effusion  The pericardium was normal in appearance  AORTA: The root exhibited normal size  SYSTEMIC VEINS: IVC: The inferior vena cava was normal in size      SYSTEM MEASUREMENT TABLES    2D  %FS: 36 93 %  Ao Diam: 2 89 cm  EDV(Teich): 82 3 ml  EF(Cube): 74 91 %  EF(Teich): 67 13 %  ESV(Cube): 19 72 ml  ESV(Teich): 27 05 ml  IVSd: 0 78 cm  LA Area: 10 04 cm2  LA Diam: 3 45 cm  LVEDV MOD A4C: 86 93 ml  LVEF MOD A4C: 60 99 %  LVESV MOD A4C: 33 91 ml  LVIDd: 4 28 cm  LVIDs: 2 7 cm  LVLd A4C: 7 58 cm  LVLs A4C: 6 22 cm  LVPWd: 0 76 cm  RA Area: 9 19 cm2  RV Diam : 2 9 cm  SV MOD A4C: 53 02 ml  SV(Cube): 58 86 ml  SV(Teich): 55 25 ml    CW  HR: 62 91 BPM  MR VTI: 198 9 cm  MR Vmax: 5 84 m/s  MR Vmean: 4 64 m/s  MR maxP 64 mmHg  MR meanP 51 mmHg  MV PHT: 55 99 ms  MV VTI: 30 21 cm  MV Vmax: 1 m/s  MV Vmean: 0 54 m/s  MV maxP mmHg  MV meanP 49 mmHg  MVA By PHT: 3 93 cm2    MM  TAPSE: 1 89 cm    PW  E': 0 08 m/s  E/E': 11 23  MV A Russell: 1 12 m/s  MV Dec [place]: 5 36 m/s2  MV DecT: 177 14 ms  MV E Russell: 0 95 m/s  MV E/A Ratio: 0 85    [employer] Accredited Echocardiography Laboratory    Prepared and electronically signed by    Kasia Parsons DO  Signed 07-May-2018 11:59:55

## 2018-05-30 ENCOUNTER — OFFICE VISIT (OUTPATIENT)
Dept: FAMILY MEDICINE CLINIC | Facility: CLINIC | Age: 63
End: 2018-05-30
Payer: COMMERCIAL

## 2018-05-30 VITALS
SYSTOLIC BLOOD PRESSURE: 128 MMHG | BODY MASS INDEX: 23.96 KG/M2 | WEIGHT: 137 LBS | HEART RATE: 76 BPM | DIASTOLIC BLOOD PRESSURE: 82 MMHG | TEMPERATURE: 99.1 F | RESPIRATION RATE: 16 BRPM

## 2018-05-30 DIAGNOSIS — J02.9 SORE THROAT: Primary | ICD-10-CM

## 2018-05-30 DIAGNOSIS — J06.9 ACUTE URI: ICD-10-CM

## 2018-05-30 DIAGNOSIS — F41.9 ANXIETY: ICD-10-CM

## 2018-05-30 PROBLEM — M54.50 CHRONIC RIGHT-SIDED LOW BACK PAIN WITHOUT SCIATICA: Status: ACTIVE | Noted: 2017-04-05

## 2018-05-30 PROBLEM — G47.00 INSOMNIA, CONTROLLED: Status: ACTIVE | Noted: 2017-04-25

## 2018-05-30 PROBLEM — E87.6 HYPOKALEMIA: Status: ACTIVE | Noted: 2017-04-27

## 2018-05-30 PROBLEM — G89.29 CHRONIC RIGHT-SIDED LOW BACK PAIN WITHOUT SCIATICA: Status: ACTIVE | Noted: 2017-04-05

## 2018-05-30 PROBLEM — R00.2 PALPITATIONS: Status: RESOLVED | Noted: 2018-05-03 | Resolved: 2018-05-30

## 2018-05-30 PROBLEM — H69.93 EUSTACHIAN TUBE DISORDER, BILATERAL: Status: ACTIVE | Noted: 2017-03-16

## 2018-05-30 LAB — S PYO AG THROAT QL: NEGATIVE

## 2018-05-30 PROCEDURE — 99214 OFFICE O/P EST MOD 30 MIN: CPT | Performed by: FAMILY MEDICINE

## 2018-05-30 PROCEDURE — 87880 STREP A ASSAY W/OPTIC: CPT | Performed by: FAMILY MEDICINE

## 2018-05-30 RX ORDER — BUSPIRONE HYDROCHLORIDE 5 MG/1
5 TABLET ORAL 2 TIMES DAILY
Qty: 60 TABLET | Refills: 1 | Status: SHIPPED | OUTPATIENT
Start: 2018-05-30 | End: 2020-11-05 | Stop reason: SDUPTHER

## 2018-05-30 RX ORDER — FLUTICASONE PROPIONATE 50 MCG
1 SPRAY, SUSPENSION (ML) NASAL DAILY
Qty: 16 G | Refills: 0 | Status: SHIPPED | OUTPATIENT
Start: 2018-05-30 | End: 2019-12-03 | Stop reason: ALTCHOICE

## 2018-05-30 RX ORDER — NAPROXEN 500 MG/1
500 TABLET ORAL 2 TIMES DAILY WITH MEALS
Qty: 30 TABLET | Refills: 0 | Status: SHIPPED | OUTPATIENT
Start: 2018-05-30 | End: 2018-11-29 | Stop reason: ALTCHOICE

## 2018-05-30 NOTE — PROGRESS NOTES
FAMILY MEDICINE PROGRESS NOTE  Lion Jules 57 y o  female   DATE: May 30, 2018     ASSESSMENT and PLAN:  Lion Jules is a 57 y o  female with:     1  Sore throat  Centor score 1 (-1 age, +1 absent cough, tender LAD), rapid strep negative  - POCT rapid strepA    2  Acute URI  <24 hours of symptoms, most likely allergic v  Viral  No Abx for 1 week  Pt will likely call in next week  - fluticasone (FLONASE) 50 mcg/act nasal spray; 1 spray into each nostril daily  Dispense: 16 g; Refill: 0  - naproxen (NAPROSYN) 500 mg tablet; Take 1 tablet (500 mg total) by mouth 2 (two) times a day with meals for 10 days  Dispense: 30 tablet; Refill: 0    3  Anxiety  Pt requested a refill of an old medication for her anxiety, hasnt had it filled for over 2 years, initially prescribed by a PCP in [place]  Pt had only been doing BID dosing at that time, but likely needs TID  Will titrate up to 5mg BID and f/u with PCP per pt preference  - busPIRone (BUSPAR) 5 mg tablet; Take 1 tablet (5 mg total) by mouth 2 (two) times a day for 30 days  Dispense: 60 tablet; Refill: 1      SUBJECTIVE:  Lion Jules is a 57 y o  female who presents today with a chief complaint of Sore Throat and Cold Like Symptoms  Started last night with a really bad sore throat, nasal congestion, doesn't really have much of a cough  Does have a history of bronchitis  Sick contacts: none    Tonsillectomy 60 years ago  No seasonal allergies, occ gets sinus problems      Sore Throat    There has been no fever  The pain is mild  Associated symptoms include congestion  Pertinent negatives include no abdominal pain, coughing, diarrhea, ear pain, plugged ear sensation, shortness of breath or vomiting  She has had no exposure to strep or mono  Review of Systems   Constitutional: Negative for activity change, chills, fatigue and fever  HENT: Positive for congestion  Negative for ear pain, rhinorrhea, sinus pain, sinus pressure, sneezing and sore throat  Eyes: Negative for discharge  Respiratory: Negative for cough and shortness of breath  Cardiovascular: Negative for chest pain and palpitations  Gastrointestinal: Negative for abdominal pain, diarrhea, nausea and vomiting  Psychiatric/Behavioral: The patient is nervous/anxious  I have reviewed the patient's PMH, Social History, Medication List and Allergies as appropriate  OBJECTIVE:  /82 (BP Location: Left arm, Patient Position: Sitting, Cuff Size: Adult)   Pulse 76   Temp 99 1 °F (37 3 °C) (Tympanic)   Resp 16   Wt 62 1 kg (137 lb)   BMI 23 96 kg/m²    Physical Exam   Constitutional: She appears well-developed and well-nourished  No distress  HENT:   Head: Normocephalic and atraumatic  Right Ear: External ear normal    Left Ear: External ear normal    Nose: Right sinus exhibits no maxillary sinus tenderness and no frontal sinus tenderness  Left sinus exhibits no maxillary sinus tenderness and no frontal sinus tenderness  Mouth/Throat: Uvula is midline, oropharynx is clear and moist and mucous membranes are normal  No oropharyngeal exudate, posterior oropharyngeal edema, posterior oropharyngeal erythema or tonsillar abscesses  Eyes: Conjunctivae are normal  Right eye exhibits no discharge  Left eye exhibits no discharge  Neck: Normal range of motion  Neck supple  Cardiovascular: Normal rate and normal heart sounds  Pulmonary/Chest: Effort normal and breath sounds normal  No respiratory distress  She has no wheezes  She has no rales  Lymphadenopathy:     She has no cervical adenopathy  Skin: She is not diaphoretic  Vitals reviewed  Labs:  Office Visit on 05/30/2018   Component Date Value Ref Range Status     RAPID STREP A 05/30/2018 Negative  Negative Final       Juan Pablo Haile MD

## 2018-05-30 NOTE — PATIENT INSTRUCTIONS
Pharyngitis   AMBULATORY CARE:   Pharyngitis , or sore throat, is inflammation of the tissues and structures in your pharynx (throat)  Pharyngitis is most often caused by bacteria  It may also be caused by a cold or flu virus  Other causes include smoking, allergies, or acid reflux  Signs and symptoms that may occur with pharyngitis:   · Sore throat or pain when you swallow    · Fever, chills, and body aches    · Hoarse or raspy voice    · Cough, runny or stuffy nose, itchy or watery eyes    · Headache    · Upset stomach and loss of appetite    · Mild neck stiffness    · Swollen glands that feel like hard lumps when you touch your neck    · White and yellow pus-filled blisters in the back of your throat  Call 911 for any of the following:   · You have trouble breathing or swallowing because your throat is swollen or sore  Seek care immediately if:   · You are drooling because it hurts too much to swallow  · Your fever is higher than 102? F (39?C) or lasts longer than 3 days  · You are confused  · You taste blood in your throat  Contact your healthcare provider if:   · Your throat pain gets worse  · You have a painful lump in your throat that does not go away after 5 days  · Your symptoms do not improve after 5 days  · You have questions or concerns about your condition or care  Treatment for pharyngitis:  Viral pharyngitis will go away on its own without treatment  Your sore throat should start to feel better in 3 to 5 days for both viral and bacterial infections  You may need any of the following:  · Antibiotics  treat a bacterial infection  · NSAIDs , such as ibuprofen, help decrease swelling, pain, and fever  NSAIDs can cause stomach bleeding or kidney problems in certain people  If you take blood thinner medicine, always ask your healthcare provider if NSAIDs are safe for you  Always read the medicine label and follow directions  · Acetaminophen  decreases pain and fever   It is available without a doctor's order  Ask how much to take and how often to take it  Follow directions  Acetaminophen can cause liver damage if not taken correctly  Manage your symptoms:   · Gargle salt water  Mix ¼ teaspoon salt in an 8 ounce glass of warm water and gargle  This may help decrease swelling in your throat  · Drink liquids as directed  You may need to drink more liquids than usual  Liquids may help soothe your throat and prevent dehydration  Ask how much liquid to drink each day and which liquids are best for you  · Use a cool-steam humidifier  to help moisten the air in your room and calm your cough  · Soothe your throat  with cough drops, ice, soft foods, or popsicles  Prevent the spread of pharyngitis:  Cover your mouth and nose when you cough or sneeze  Do not share food or drinks  Wash your hands often  Use soap and water  If soap and water are unavailable, use an alcohol based hand   Follow up with your healthcare provider as directed:  Write down your questions so you remember to ask them during your visits  © 2017 [facility] Information is for End User's use only and may not be sold, redistributed or otherwise used for commercial purposes  All illustrations and images included in CareNotes® are the copyrighted property of [employer]  or [employer]  The above information is an  only  It is not intended as medical advice for individual conditions or treatments  Talk to your doctor, nurse or pharmacist before following any medical regimen to see if it is safe and effective for you

## 2018-06-02 ENCOUNTER — OFFICE VISIT (OUTPATIENT)
Dept: URGENT CARE | Age: 63
End: 2018-06-02
Payer: COMMERCIAL

## 2018-06-02 VITALS
HEART RATE: 80 BPM | DIASTOLIC BLOOD PRESSURE: 70 MMHG | OXYGEN SATURATION: 98 % | SYSTOLIC BLOOD PRESSURE: 158 MMHG | TEMPERATURE: 98 F | BODY MASS INDEX: 23.22 KG/M2 | WEIGHT: 136 LBS | RESPIRATION RATE: 20 BRPM | HEIGHT: 64 IN

## 2018-06-02 DIAGNOSIS — J40 BRONCHITIS: ICD-10-CM

## 2018-06-02 DIAGNOSIS — J02.9 SORE THROAT: Primary | ICD-10-CM

## 2018-06-02 PROCEDURE — G0382 LEV 3 HOSP TYPE B ED VISIT: HCPCS | Performed by: NURSE PRACTITIONER

## 2018-06-02 RX ORDER — HYDROCODONE POLISTIREX AND CHLORPHENIRAMINE POLISTIREX 10; 8 MG/5ML; MG/5ML
5 SUSPENSION, EXTENDED RELEASE ORAL EVERY 12 HOURS PRN
Qty: 120 ML | Refills: 0 | Status: SHIPPED | OUTPATIENT
Start: 2018-06-02 | End: 2018-11-29 | Stop reason: ALTCHOICE

## 2018-06-02 NOTE — PATIENT INSTRUCTIONS
Acute Bronchitis   WHAT YOU NEED TO KNOW:   Acute bronchitis is swelling and irritation in the air passages of your lungs  This irritation may cause you to cough or have other breathing problems  Acute bronchitis often starts because of another illness, such as a cold or the flu  The illness spreads from your nose and throat to your windpipe and airways  Bronchitis is often called a chest cold  Acute bronchitis lasts about 3 to 6 weeks and is usually not a serious illness  Your cough can last for several weeks  DISCHARGE INSTRUCTIONS:   Return to the emergency department if:   · You cough up blood  · Your lips or fingernails turn blue  · You feel like you are not getting enough air when you breathe  Contact your healthcare provider if:   · You have a fever  · Your breathing problems do not go away or get worse  · Your cough does not get better within 4 weeks  · You have questions or concerns about your condition or care  Self-care:   · Get more rest   Rest helps your body to heal  Slowly start to do more each day  Rest when you feel it is needed  · Avoid irritants in the air  Avoid chemicals, fumes, and dust  Wear a face mask if you must work around dust or fumes  Stay inside on days when air pollution levels are high  If you have allergies, stay inside when pollen counts are high  Do not use aerosol products, such as spray-on deodorant, bug spray, and hair spray  · Do not smoke or be around others who smoke  Nicotine and other chemicals in cigarettes and cigars damages the cilia that move mucus out of your lungs  Ask your healthcare provider for information if you currently smoke and need help to quit  E-cigarettes or smokeless tobacco still contain nicotine  Talk to your healthcare provider before you use these products  · Drink liquids as directed  Liquids help keep your air passages moist and help you cough up mucus   You may need to drink more liquids when you have acute bronchitis  Ask how much liquid to drink each day and which liquids are best for you  · Use a humidifier or vaporizer  Use a cool mist humidifier or a vaporizer to increase air moisture in your home  This may make it easier for you to breathe and help decrease your cough  Decrease risk for acute bronchitis:   · Get the vaccinations you need  Ask your healthcare provider if you should get vaccinated against the flu or pneumonia  · Prevent the spread of germs  You can decrease your risk of acute bronchitis and other illnesses by doing the following:     [facility] AUTHORITY your hands often with soap and water  Carry germ-killing hand lotion or gel with you  You can use the lotion or gel to clean your hands when soap and water are not available  ¨ Do not touch your eyes, nose, or mouth unless you have washed your hands first     ¨ Always cover your mouth when you cough to prevent the spread of germs  It is best to cough into a tissue or your shirt sleeve instead of into your hand  Ask those around you cover their mouths when they cough  ¨ Try to avoid people who have a cold or the flu  If you are sick, stay away from others as much as possible  Medicines: Your healthcare provider may  give you any of the following:  · Ibuprofen or acetaminophen  are medicines that help lower your fever  They are available without a doctor's order  Ask your healthcare provider which medicine is right for you  Ask how much to take and how often to take it  Follow directions  These medicines can cause stomach bleeding if not taken correctly  Ibuprofen can cause kidney damage  Do not take ibuprofen if you have kidney disease, an ulcer, or allergies to aspirin  Acetaminophen can cause liver damage  Do not take more than 4,000 milligrams in 24 hours  · Decongestants  help loosen mucus in your lungs and make it easier to cough up  This can help you breathe easier  · Cough suppressants  decrease your urge to cough   If your cough produces mucus, do not take a cough suppressant unless your healthcare provider tells you to  Your healthcare provider may suggest that you take a cough suppressant at night so you can rest     · Inhalers  may be given  Your healthcare provider may give you one or more inhalers to help you breathe easier and cough less  An inhaler gives your medicine to open your airways  Ask your healthcare provider to show you how to use your inhaler correctly  · Take your medicine as directed  Contact your healthcare provider if you think your medicine is not helping or if you have side effects  Tell him of her if you are allergic to any medicine  Keep a list of the medicines, vitamins, and herbs you take  Include the amounts, and when and why you take them  Bring the list or the pill bottles to follow-up visits  Carry your medicine list with you in case of an emergency  Follow up with your healthcare provider as directed:  Write down questions you have so you will remember to ask them during your follow-up visits  © 2017 [address] [employer] Information is for End User's use only and may not be sold, redistributed or otherwise used for commercial purposes  All illustrations and images included in CareNotes® are the copyrighted property of [employer]  or [employer] Suarez  The above information is an  only  It is not intended as medical advice for individual conditions or treatments  Talk to your doctor, nurse or pharmacist before following any medical regimen to see if it is safe and effective for you

## 2018-06-02 NOTE — PROGRESS NOTES
Assessment/Plan    Sore throat [J02 9]  1  Sore throat  POCT rapid strepA   2  Bronchitis  hydrocodone-chlorpheniramine polistirex (TUSSIONEX) 10-8 mg/5 mL ER suspension     - patient advised she likely has acute viral bronchitis  - No antibiotics needed at this time  - to take meds as directed and if no improvements in 3-5 days to f/u with pcp  - need for adequate hydration  - report new s/s promptly        Subjective: presents to the clinic for cough and sore throat     Patient ID: Esdras Bennett is a 57 y o  female  Reason For Visit / Chief Complaint  Chief Complaint   Patient presents with    Cough     symptoms started last week  Pt did see her pcp and was give flonase and naproxin  little effects  Pt came to car now today as was told by her pcp was possible abt therapy   Cold Like Symptoms    Sore Throat    Headache         HPI   She reports she has been having cough from about 5-6 days  About 4 days ago, she developed a sore throat which went away the next day and she started feeling it again yesterday  No wheezing, fever, N/V/D, abd pain  Mostly dry cough but sometimes has minimal mucus  A little sneezing and runny nose  Denies previous Hx of asthma but reports she has been diagnosed with bronchitis in the past  POC strep test today is negative  She refused for us to send the swab to the lab for culture           Past Medical History:   Diagnosis Date    Allergic        Past Surgical History:   Procedure Laterality Date    CAROTID ARTERY ANGIOPLASTY      CHOLECYSTECTOMY      HYSTERECTOMY N/A     uterus removed    LEG SURGERY Left     TONSILLECTOMY         Family History   Problem Relation Age of Onset    Kidney cancer Mother     Aneurysm Father      abdominal aortic aneurysm    Breast cancer Sister     Breast cancer Maternal Grandmother     Breast cancer Maternal Aunt     Stomach cancer Paternal Uncle     Hypertension Maternal Grandfather     Diabetes Maternal Grandfather     Sudden death Maternal Grandfather      cardiac    Coronary artery disease Neg Hx     Stroke Neg Hx     Arthritis Neg Hx     Hyperlipidemia Neg Hx        Review of Systems   Constitutional: Positive for fatigue (from coughing so much)  Negative for appetite change, chills and fever  HENT: Positive for congestion (nose), rhinorrhea, sneezing (only a little) and sore throat  Negative for sinus pressure and trouble swallowing  Respiratory: Positive for cough (a lot)  Negative for chest tightness, shortness of breath and wheezing  Cardiovascular: Negative for chest pain and palpitations  Gastrointestinal: Negative for diarrhea, nausea and vomiting  Objective:    /70 (BP Location: Right arm, Patient Position: Sitting, Cuff Size: Standard)   Pulse 80   Temp 98 °F (36 7 °C)   Resp 20   Ht 5' 4" (1 626 m)   Wt 61 7 kg (136 lb)   SpO2 98%   BMI 23 34 kg/m²     Physical Exam   Constitutional: She is oriented to person, place, and time  HENT:   Head: Normocephalic and atraumatic  Right Ear: External ear normal    Left Ear: External ear normal    Moderate congestion in the nares   Eyes: Conjunctivae and EOM are normal  Pupils are equal, round, and reactive to light  Right eye exhibits no discharge  Left eye exhibits no discharge  Neck: Normal range of motion  Neck supple  Cardiovascular: Normal rate, regular rhythm and normal heart sounds  No murmur heard  Pulmonary/Chest: Effort normal and breath sounds normal  No respiratory distress  She has no wheezes  Lymphadenopathy:     She has no cervical adenopathy  Neurological: She is alert and oriented to person, place, and time

## 2018-06-04 LAB — S PYO AG THROAT QL: NEGATIVE

## 2018-06-27 ENCOUNTER — TELEPHONE (OUTPATIENT)
Dept: FAMILY MEDICINE CLINIC | Facility: CLINIC | Age: 63
End: 2018-06-27

## 2018-06-27 NOTE — TELEPHONE ENCOUNTER
Pt is calling to see if shingles is contagious  Her friend was recently diagnosed with it and she wants to know if it ok for her to visit her?

## 2018-08-07 ENCOUNTER — TELEPHONE (OUTPATIENT)
Dept: FAMILY MEDICINE CLINIC | Facility: CLINIC | Age: 63
End: 2018-08-07

## 2018-08-07 NOTE — TELEPHONE ENCOUNTER
Pt called   stated she last year you recommended a Dermatologist  She is asking if you remember the name that you gave her? Please advise  Patient would like a return call

## 2018-09-24 DIAGNOSIS — N30.00 ACUTE CYSTITIS WITHOUT HEMATURIA: Primary | ICD-10-CM

## 2018-09-25 RX ORDER — SULFAMETHOXAZOLE AND TRIMETHOPRIM 400; 80 MG/1; MG/1
1 TABLET ORAL DAILY
Qty: 10 TABLET | Refills: 6 | Status: SHIPPED | OUTPATIENT
Start: 2018-09-25 | End: 2018-10-05

## 2018-11-19 ENCOUNTER — HOSPITAL ENCOUNTER (OUTPATIENT)
Dept: RADIOLOGY | Facility: HOSPITAL | Age: 63
Discharge: HOME | End: 2018-11-19
Payer: COMMERCIAL

## 2018-11-19 ENCOUNTER — HOSPITAL ENCOUNTER (OUTPATIENT)
Dept: RADIOLOGY | Facility: HOSPITAL | Age: 63
Discharge: HOME | End: 2018-11-19
Attending: UROLOGY
Payer: COMMERCIAL

## 2018-11-19 ENCOUNTER — HOSPITAL ENCOUNTER (OUTPATIENT)
Dept: ULTRASOUND IMAGING | Facility: HOSPITAL | Age: 63
Discharge: HOME | End: 2018-11-19
Attending: UROLOGY
Payer: COMMERCIAL

## 2018-11-19 DIAGNOSIS — N20.0 CALCULUS OF KIDNEY: ICD-10-CM

## 2018-11-19 PROCEDURE — 76770 US EXAM ABDO BACK WALL COMP: CPT

## 2018-11-19 PROCEDURE — 74018 RADEX ABDOMEN 1 VIEW: CPT

## 2018-11-28 ENCOUNTER — OFFICE VISIT (OUTPATIENT)
Dept: UROLOGY | Facility: CLINIC | Age: 63
End: 2018-11-28
Payer: COMMERCIAL

## 2018-11-28 VITALS
WEIGHT: 138 LBS | SYSTOLIC BLOOD PRESSURE: 120 MMHG | BODY MASS INDEX: 23.56 KG/M2 | DIASTOLIC BLOOD PRESSURE: 66 MMHG | HEIGHT: 64 IN | HEART RATE: 64 BPM

## 2018-11-28 DIAGNOSIS — N20.0 NEPHROLITHIASIS: Primary | ICD-10-CM

## 2018-11-28 DIAGNOSIS — N39.0 RECURRENT UTI: ICD-10-CM

## 2018-11-28 DIAGNOSIS — N28.1 RENAL CYST, ACQUIRED, LEFT: ICD-10-CM

## 2018-11-28 PROCEDURE — 99214 OFFICE O/P EST MOD 30 MIN: CPT | Performed by: UROLOGY

## 2018-11-28 RX ORDER — SULFAMETHOXAZOLE AND TRIMETHOPRIM 200; 40 MG/5ML; MG/5ML
SUSPENSION ORAL ONCE
COMMUNITY
End: 2018-11-28

## 2018-11-28 RX ORDER — SULFAMETHOXAZOLE AND TRIMETHOPRIM 800; 160 MG/1; MG/1
1 TABLET ORAL EVERY 12 HOURS SCHEDULED
Qty: 30 TABLET | Refills: 3 | Status: SHIPPED | OUTPATIENT
Start: 2018-11-28 | End: 2019-05-24 | Stop reason: SDUPTHER

## 2018-11-28 NOTE — PROGRESS NOTES
Referring Physician: Tina Mello MD  A copy of this note was sent to the referring physician  Diagnoses and all orders for this visit:    Nephrolithiasis  -     CT renal protocol; Future  -     Creatinine, serum; Future  -     Creatinine, serum    Renal cyst, acquired, left  -     CT renal protocol; Future  -     Creatinine, serum; Future  -     Creatinine, serum    Recurrent UTI  -     sulfamethoxazole-trimethoprim (BACTRIM DS) 800-160 mg per tablet; Take 1 tablet by mouth every 12 (twelve) hours for 1 day As needed    Other orders  -     Discontinue: sulfamethoxazole-trimethoprim (BACTRIM) 200-40 mg/5 mL suspension; Take by mouth once            Assessment and plan:       1  Recurrent nephrolithiasis  -small intrarenal calculus asymptomatic    2  Recurrent urinary tract infections, post coital  -doing well with probiotic and post coital Bactrim    3   6 mm stable left renal cyst  - benign features on ultrasound, stable in size  - familial history of metastatic renal cell cancer in her mother is noted    Miracle Anne is doing well from a urologic perspective  We looked at her ultrasound together  This appears to be a benign cyst   It is stable in size from last year's study  She is asymptomatic from a stone perspective and doing well with her UTIs as well  Given her familial history of renal cell carcinoma I have recommended a contrasted CT for her next imaging study both to follow up on the stones as well as to further evaluate the cyst   This will be scheduled next year  Her post coital Bactrim was refilled  If she calls with symptoms of UTI I recommended initiating her empirically on a 10 day course of Cipro without the need for urinary testing or an office visit    Mary Booth MD      Chief Complaint     Stone UTI follow-up      History of Present Illness     Argelia Gabriel is a 70 y o  female returns in follow-up for small intrarenal calculi as well as a history of recurrent UTIs      He is doing very well  She has no stone pain  She has not had any UTIs in the past year  He is continuing on a daily probiotic as well as postcoital Bactrim when needed    He presents with updated imaging  No hematuria, UTIs, fever  Detailed Urologic History     - please refer to HPI    Review of Systems     Review of Systems   Constitutional: Negative for activity change and fatigue  HENT: Negative for congestion  Eyes: Negative for visual disturbance  Respiratory: Negative for shortness of breath and wheezing  Cardiovascular: Negative for chest pain and leg swelling  Gastrointestinal: Negative for abdominal pain  Genitourinary: Negative for dysuria, flank pain, hematuria and urgency  Musculoskeletal: Negative for back pain  Allergic/Immunologic: Negative for immunocompromised state  Neurological: Negative for dizziness and numbness  Psychiatric/Behavioral: Negative for dysphoric mood  All other systems reviewed and are negative  Allergies     Allergies   Allergen Reactions    Augmentin [Amoxicillin-Pot Clavulanate] GI Intolerance    Macrobid [Nitrofurantoin] GI Intolerance       Physical Exam     Physical Exam   Constitutional: She is oriented to person, place, and time  She appears well-developed  HENT:   Head: Normocephalic and atraumatic  Eyes: Pupils are equal, round, and reactive to light  Neck: Normal range of motion  Cardiovascular:   Negative lower extremity edema   Pulmonary/Chest: Effort normal and breath sounds normal    Abdominal: Soft  Genitourinary:   Genitourinary Comments: Negative flank pain   Musculoskeletal: Normal range of motion  Neurological: She is alert and oriented to person, place, and time  Skin: Skin is warm  Psychiatric: She has a normal mood and affect             Vital Signs  Vitals:    11/28/18 0933   BP: 120/66   Pulse: 64   Weight: 62 6 kg (138 lb)   Height: 5' 4" (1 626 m)         Current Medications       Current Outpatient Prescriptions:     cholecalciferol (VITAMIN D3) 1,000 units tablet, Take 1,000 Units by mouth daily  , Disp: , Rfl:     diazepam (VALIUM) 5 mg tablet, 1 po after supper for anxiety- no driving with this, no alcohol with this    Do not combine with zolpidem, Disp: 30 tablet, Rfl: 0    estradiol (ESTRACE VAGINAL) 0 1 mg/g vaginal cream, Insert 1 g into the vagina 2 (two) times a week, Disp: 42 5 g, Rfl: 3    fluticasone (FLONASE) 50 mcg/act nasal spray, 1 spray into each nostril daily, Disp: 16 g, Rfl: 0    Multiple Vitamins-Minerals (CENTRUM SILVER ADULT 50+) TABS, Take 1 tablet by mouth daily  , Disp: , Rfl:     Probiotic Product (PROBIOTIC COLON SUPPORT) CAPS, Take 1 capsule by mouth daily  , Disp: , Rfl:     simvastatin (ZOCOR) 40 mg tablet, TAKE ONE TABLET BY MOUTH EVERY DAY, Disp: 90 tablet, Rfl: 0    Vaginal Lubricant (REPLENS) GEL, Insert into the vagina, Disp: , Rfl:     zolpidem (AMBIEN) 10 mg tablet, Take 1 tablet (10 mg total) by mouth daily at bedtime as needed for sleep, Disp: 30 tablet, Rfl: 0    busPIRone (BUSPAR) 5 mg tablet, Take 1 tablet (5 mg total) by mouth 2 (two) times a day for 30 days, Disp: 60 tablet, Rfl: 1    hydrocodone-chlorpheniramine polistirex (TUSSIONEX) 10-8 mg/5 mL ER suspension, Take 5 mL by mouth every 12 (twelve) hours as needed for cough Max Daily Amount: 10 mL (Patient not taking: Reported on 11/28/2018 ), Disp: 120 mL, Rfl: 0    naproxen (NAPROSYN) 500 mg tablet, Take 1 tablet (500 mg total) by mouth 2 (two) times a day with meals for 10 days, Disp: 30 tablet, Rfl: 0    sulfamethoxazole-trimethoprim (BACTRIM DS) 800-160 mg per tablet, Take 1 tablet by mouth every 12 (twelve) hours for 1 day As needed, Disp: 30 tablet, Rfl: 3      Active Problems     Patient Active Problem List   Diagnosis    Sleep disturbance    Screening for AAA (abdominal aortic aneurysm)    Screening for cardiovascular condition    PROSPER (generalized anxiety disorder)    Abnormal EKG    Anxiety    Atrophic vaginitis    Chronic right-sided low back pain without sciatica    Eustachian tube disorder, bilateral    Hypokalemia    Insomnia, controlled    MVP (mitral valve prolapse)    Nephrolithiasis    Osteopenia    Other hyperlipidemia    Thyroid nodule    Recurrent UTI    Renal cyst, acquired, left         Past Medical History     Past Medical History:   Diagnosis Date    Allergic     Recurrent UTI          Surgical History     Past Surgical History:   Procedure Laterality Date    CAROTID ARTERY ANGIOPLASTY      CHOLECYSTECTOMY      HYSTERECTOMY N/A     uterus removed    LEG SURGERY Left     SALIVARY GLAND SURGERY      excision of parotid tumor/gland    SALPINGOOPHORECTOMY      TONSILLECTOMY           Family History     Family History   Problem Relation Age of Onset    Kidney cancer Mother     Aneurysm Father         abdominal aortic aneurysm    Nephrolithiasis Father     Breast cancer Sister     Breast cancer Maternal Grandmother     Breast cancer Maternal Aunt     Stomach cancer Paternal Uncle     Hypertension Maternal Grandfather     Diabetes Maternal Grandfather     Sudden death Maternal Grandfather         cardiac    Polycystic ovary syndrome Daughter     Coronary artery disease Neg Hx     Stroke Neg Hx     Arthritis Neg Hx     Hyperlipidemia Neg Hx          Social History     Social History     History   Smoking Status    Never Smoker   Smokeless Tobacco    Never Used         Pertinent Lab Values     Lab Results   Component Value Date    CREATININE 0 78 01/22/2018       No results found for: PSA    @RESULTRCNT(1H])@      Pertinent Imaging        FINDINGS:     KIDNEYS:  Symmetric and normal size  Right kidney:  10 2 x 5 8 cm  Left kidney:  11 x 5 9 cm      Right kidney  Normal echogenicity and contour  No suspicious masses detected  No hydronephrosis  No shadowing calculi    No perinephric fluid collections      Left kidney  Normal echogenicity and contour  No suspicious masses detected  0 6 x 0 6 x 0 4 cm simple cyst   No hydronephrosis  0 5 cm lower pole and an 0 2 cm interpolar calculus  No perinephric fluid collections      URETERS:  Nonvisualized      BLADDER:   Normally distended  No focal thickening or mass lesions  Bilateral ureteral jets detected         IMPRESSION:     Left nephrolithiasis measuring up to 0 5 cm      0 6 cm simple cyst left kidney      Remainder of the examination is normal      IMPRESSION:     Tiny left lower pole calculi  Portions of the record may have been created with voice recognition software   Occasional wrong word or "sound a like" substitutions may have occurred due to the inherent limitations of voice recognition software   Read the chart carefully and recognize, using context, where substitutions have occurred

## 2018-11-29 ENCOUNTER — OFFICE VISIT (OUTPATIENT)
Dept: INTERNAL MEDICINE CLINIC | Facility: CLINIC | Age: 63
End: 2018-11-29
Payer: COMMERCIAL

## 2018-11-29 ENCOUNTER — TELEPHONE (OUTPATIENT)
Dept: PHYSICAL THERAPY | Facility: OTHER | Age: 63
End: 2018-11-29

## 2018-11-29 ENCOUNTER — NURSE TRIAGE (OUTPATIENT)
Dept: PHYSICAL THERAPY | Facility: OTHER | Age: 63
End: 2018-11-29

## 2018-11-29 ENCOUNTER — DOCUMENTATION (OUTPATIENT)
Dept: PHYSICAL THERAPY | Facility: OTHER | Age: 63
End: 2018-11-29

## 2018-11-29 VITALS
WEIGHT: 131.6 LBS | HEIGHT: 63 IN | OXYGEN SATURATION: 96 % | BODY MASS INDEX: 23.32 KG/M2 | DIASTOLIC BLOOD PRESSURE: 78 MMHG | SYSTOLIC BLOOD PRESSURE: 136 MMHG | HEART RATE: 70 BPM

## 2018-11-29 DIAGNOSIS — N39.0 RECURRENT UTI: ICD-10-CM

## 2018-11-29 DIAGNOSIS — E78.49 OTHER HYPERLIPIDEMIA: ICD-10-CM

## 2018-11-29 DIAGNOSIS — Z23 NEED FOR PNEUMOCOCCAL VACCINATION: ICD-10-CM

## 2018-11-29 DIAGNOSIS — F41.9 ANXIETY: ICD-10-CM

## 2018-11-29 DIAGNOSIS — G89.29 CHRONIC RIGHT-SIDED LOW BACK PAIN WITHOUT SCIATICA: Primary | ICD-10-CM

## 2018-11-29 DIAGNOSIS — E04.1 THYROID NODULE: ICD-10-CM

## 2018-11-29 DIAGNOSIS — N28.1 RENAL CYST, ACQUIRED, LEFT: ICD-10-CM

## 2018-11-29 DIAGNOSIS — M85.80 OSTEOPENIA, UNSPECIFIED LOCATION: ICD-10-CM

## 2018-11-29 DIAGNOSIS — F41.1 GAD (GENERALIZED ANXIETY DISORDER): ICD-10-CM

## 2018-11-29 DIAGNOSIS — F41.1 GAD (GENERALIZED ANXIETY DISORDER): Primary | ICD-10-CM

## 2018-11-29 DIAGNOSIS — G89.29 CHRONIC RIGHT-SIDED LOW BACK PAIN WITHOUT SCIATICA: ICD-10-CM

## 2018-11-29 DIAGNOSIS — Z23 NEED FOR INFLUENZA VACCINATION: ICD-10-CM

## 2018-11-29 DIAGNOSIS — E55.9 VITAMIN D DEFICIENCY: ICD-10-CM

## 2018-11-29 DIAGNOSIS — G47.9 SLEEP DISTURBANCE: ICD-10-CM

## 2018-11-29 DIAGNOSIS — H40.003 GLAUCOMA SUSPECT OF BOTH EYES: ICD-10-CM

## 2018-11-29 DIAGNOSIS — N20.0 NEPHROLITHIASIS: ICD-10-CM

## 2018-11-29 DIAGNOSIS — M54.50 CHRONIC RIGHT-SIDED LOW BACK PAIN WITHOUT SCIATICA: ICD-10-CM

## 2018-11-29 DIAGNOSIS — G47.00 INSOMNIA, CONTROLLED: ICD-10-CM

## 2018-11-29 DIAGNOSIS — M54.50 CHRONIC RIGHT-SIDED LOW BACK PAIN WITHOUT SCIATICA: Primary | ICD-10-CM

## 2018-11-29 PROBLEM — E87.6 HYPOKALEMIA: Status: RESOLVED | Noted: 2017-04-27 | Resolved: 2018-11-29

## 2018-11-29 PROCEDURE — 99214 OFFICE O/P EST MOD 30 MIN: CPT | Performed by: INTERNAL MEDICINE

## 2018-11-29 PROCEDURE — 1101F PT FALLS ASSESS-DOCD LE1/YR: CPT | Performed by: INTERNAL MEDICINE

## 2018-11-29 RX ORDER — DIAZEPAM 5 MG/1
TABLET ORAL
Qty: 30 TABLET | Refills: 0 | Status: SHIPPED | OUTPATIENT
Start: 2018-11-29 | End: 2019-05-24 | Stop reason: SDUPTHER

## 2018-11-29 RX ORDER — ZOLPIDEM TARTRATE 10 MG/1
10 TABLET ORAL
Qty: 30 TABLET | Refills: 0 | Status: SHIPPED | OUTPATIENT
Start: 2018-11-29 | End: 2019-05-24 | Stop reason: SDUPTHER

## 2018-11-29 NOTE — TELEPHONE ENCOUNTER
Pt reports that her PCP was referring her to Pain Management  RN explained that [facility] is physical therapy based program in which patients are scheduled for a consultation  Pt stated that she already did physical therapy and it made it worse  States Dr Jessica Kaye stopped therapy and I had a guided needle injection that didn't help  Think is is my SI Joint  I am supposed to see a Dr John Oar  Isn't he pain management  RN informed pt that referral was mistakenly ordered to [facility] instead of Pain Management    RN informed pt that she would alert PCP and request corrected referral

## 2018-11-29 NOTE — PROGRESS NOTES
Assessment/Plan:    Thyroid nodule  US in April showed stable nodules, 3 year repeat recommended    Osteopenia  DXA to be scheduled    Nephrolithiasis  CT next year  F/U with urology    Recurrent UTI  On Bactrim prn    Renal cyst, acquired, left  CT with contrast next year    PROSPER (generalized anxiety disorder)  Takes Valium prn  She is not interested in starting maintenance meds at this time    Glaucoma suspect of both eyes  Monitored by Dr Denia Arciniega    Other hyperlipidemia  Controlled on simvastatin    Insomnia, controlled  Limit use of Ambien prn         Problem List Items Addressed This Visit        Endocrine    Thyroid nodule     US in April showed stable nodules, 3 year repeat recommended            Musculoskeletal and Integument    Osteopenia     DXA to be scheduled            Genitourinary    Nephrolithiasis     CT next year  F/U with urology         Recurrent UTI     On Bactrim prn         Renal cyst, acquired, left     CT with contrast next year            Other    Sleep disturbance    Relevant Medications    zolpidem (AMBIEN) 10 mg tablet    Anxiety    Chronic right-sided low back pain without sciatica    Relevant Orders    Ambulatory Referral to [facility]    PROSPER (generalized anxiety disorder) - Primary     Takes Valium prn  She is not interested in starting maintenance meds at this time         Relevant Medications    zolpidem (AMBIEN) 10 mg tablet    diazepam (VALIUM) 5 mg tablet    Insomnia, controlled     Limit use of Ambien prn         Other hyperlipidemia     Controlled on simvastatin         Relevant Orders    CBC and differential    Comprehensive metabolic panel    Lipid panel    Glaucoma suspect of both eyes     Monitored by Dr Denia Arciniega           Other Visit Diagnoses     Need for influenza vaccination        Relevant Orders    influenza vaccine, [id number], high-dose, PF 0 5 mL, for patients 65 yr+ (FLUZONE HIGH-DOSE)    Need for pneumococcal vaccination        Relevant Orders PNEUMOCOCCAL CONJUGATE VACCINE 13-VALENT GREATER THAN 6 MONTHS    Vitamin D deficiency        Relevant Orders    Vitamin D 25 hydroxy            Subjective:      Patient ID: Harleen Arrington is a 70 y o  female  HPI  Here to establish care; moved [place] the  [place] years ago from [facility]- taking Valium 5mg prn which does not help much  She would like to take 10mg  Feels like her body always needs higher doses, even with antibiotics  She also takes Ambien 10 mg prn  She does not take them together  Buspar did not help, she has not taken it  In >2 years  Also with chronic right SI joint pain, failed injection last August 2017  Palpitations, saw Dr Chad Thompson in May for an abnormal EKG  Kidney stones, f/u with urology    The following portions of the patient's history were reviewed and updated as appropriate: allergies, current medications, past family history, past medical history, past social history and problem list     Review of Systems   Constitutional: Negative for fatigue, fever and unexpected weight change  HENT: Negative for sinus pain, sinus pressure and sore throat  Respiratory: Negative for cough, shortness of breath and wheezing  Cardiovascular: Positive for palpitations (occasional)  Negative for chest pain and leg swelling  Gastrointestinal: Positive for constipation and diarrhea  Negative for abdominal pain, blood in stool, nausea and vomiting  Genitourinary: Negative for difficulty urinating, hematuria and vaginal bleeding  Musculoskeletal: Positive for back pain  Negative for arthralgias and myalgias  Neurological: Positive for headaches  Negative for dizziness  Psychiatric/Behavioral: Positive for sleep disturbance  The patient is nervous/anxious  Objective:      /78   Pulse 70   Ht 5' 3" (1 6 m)   Wt 59 7 kg (131 lb 9 6 oz)   SpO2 96%   BMI 23 31 kg/m²          Physical Exam   Constitutional: She is oriented to person, place, and time   She appears well-developed and well-nourished  HENT:   Head: Normocephalic and atraumatic  Right Ear: External ear normal    Left Ear: External ear normal    Mouth/Throat: Oropharynx is clear and moist    Eyes: Conjunctivae are normal    Neck: Neck supple  Cardiovascular: Normal rate, regular rhythm and normal heart sounds  No murmur heard  Pulmonary/Chest: Effort normal and breath sounds normal  No respiratory distress  She has no wheezes  She has no rales  Abdominal: Soft  Bowel sounds are normal  She exhibits no distension and no mass  There is no tenderness  There is no rebound and no guarding  Musculoskeletal: Normal range of motion  Neurological: She is alert and oriented to person, place, and time  Skin: Skin is warm and dry  Psychiatric: She has a normal mood and affect   Her behavior is normal  Judgment and thought content normal

## 2018-12-06 ENCOUNTER — HOSPITAL ENCOUNTER (OUTPATIENT)
Dept: RADIOLOGY | Age: 63
Discharge: HOME | End: 2018-12-06
Payer: COMMERCIAL

## 2018-12-06 VITALS — HEIGHT: 63 IN | WEIGHT: 131 LBS | BODY MASS INDEX: 23.21 KG/M2

## 2018-12-06 DIAGNOSIS — M85.80 OSTEOPENIA, UNSPECIFIED LOCATION: ICD-10-CM

## 2018-12-06 DIAGNOSIS — Z12.31 ENCOUNTER FOR SCREENING MAMMOGRAM FOR MALIGNANT NEOPLASM OF BREAST: ICD-10-CM

## 2018-12-06 PROCEDURE — 77080 DXA BONE DENSITY AXIAL: CPT

## 2018-12-06 PROCEDURE — 77063 BREAST TOMOSYNTHESIS BI: CPT

## 2018-12-06 PROCEDURE — 77067 SCR MAMMO BI INCL CAD: CPT

## 2019-01-28 DIAGNOSIS — E78.01 FAMILIAL HYPERCHOLESTEROLEMIA: ICD-10-CM

## 2019-01-28 RX ORDER — SIMVASTATIN 40 MG
40 TABLET ORAL DAILY
Qty: 90 TABLET | Refills: 1 | Status: SHIPPED | OUTPATIENT
Start: 2019-01-28 | End: 2020-02-13

## 2019-05-10 ENCOUNTER — OFFICE VISIT (OUTPATIENT)
Dept: INTERNAL MEDICINE CLINIC | Facility: CLINIC | Age: 64
End: 2019-05-10
Payer: COMMERCIAL

## 2019-05-10 VITALS
HEART RATE: 84 BPM | WEIGHT: 133.2 LBS | BODY MASS INDEX: 23.6 KG/M2 | DIASTOLIC BLOOD PRESSURE: 80 MMHG | OXYGEN SATURATION: 96 % | SYSTOLIC BLOOD PRESSURE: 120 MMHG | TEMPERATURE: 98.8 F | HEIGHT: 63 IN

## 2019-05-10 DIAGNOSIS — J01.90 ACUTE NON-RECURRENT SINUSITIS, UNSPECIFIED LOCATION: Primary | ICD-10-CM

## 2019-05-10 PROCEDURE — 1160F RVW MEDS BY RX/DR IN RCRD: CPT | Performed by: INTERNAL MEDICINE

## 2019-05-10 PROCEDURE — 3008F BODY MASS INDEX DOCD: CPT | Performed by: INTERNAL MEDICINE

## 2019-05-10 PROCEDURE — 99213 OFFICE O/P EST LOW 20 MIN: CPT | Performed by: INTERNAL MEDICINE

## 2019-05-10 RX ORDER — DOXYCYCLINE HYCLATE 100 MG/1
100 CAPSULE ORAL EVERY 12 HOURS SCHEDULED
Qty: 20 CAPSULE | Refills: 0 | Status: SHIPPED | OUTPATIENT
Start: 2019-05-10 | End: 2019-05-20

## 2019-05-24 ENCOUNTER — OFFICE VISIT (OUTPATIENT)
Dept: INTERNAL MEDICINE CLINIC | Facility: CLINIC | Age: 64
End: 2019-05-24
Payer: COMMERCIAL

## 2019-05-24 VITALS
TEMPERATURE: 98.6 F | DIASTOLIC BLOOD PRESSURE: 90 MMHG | OXYGEN SATURATION: 95 % | SYSTOLIC BLOOD PRESSURE: 132 MMHG | WEIGHT: 133 LBS | HEIGHT: 63 IN | BODY MASS INDEX: 23.57 KG/M2 | HEART RATE: 69 BPM

## 2019-05-24 DIAGNOSIS — Z11.59 NEED FOR HEPATITIS C SCREENING TEST: ICD-10-CM

## 2019-05-24 DIAGNOSIS — E78.49 OTHER HYPERLIPIDEMIA: ICD-10-CM

## 2019-05-24 DIAGNOSIS — N30.00 ACUTE CYSTITIS WITHOUT HEMATURIA: ICD-10-CM

## 2019-05-24 DIAGNOSIS — I34.0 MITRAL VALVE INSUFFICIENCY, UNSPECIFIED ETIOLOGY: ICD-10-CM

## 2019-05-24 DIAGNOSIS — M85.80 OSTEOPENIA, UNSPECIFIED LOCATION: ICD-10-CM

## 2019-05-24 DIAGNOSIS — Z00.00 WELLNESS EXAMINATION: Primary | ICD-10-CM

## 2019-05-24 DIAGNOSIS — F41.1 GAD (GENERALIZED ANXIETY DISORDER): ICD-10-CM

## 2019-05-24 DIAGNOSIS — G47.9 SLEEP DISTURBANCE: ICD-10-CM

## 2019-05-24 DIAGNOSIS — G47.00 INSOMNIA, CONTROLLED: ICD-10-CM

## 2019-05-24 DIAGNOSIS — N39.0 RECURRENT UTI: ICD-10-CM

## 2019-05-24 DIAGNOSIS — E04.1 THYROID NODULE: ICD-10-CM

## 2019-05-24 PROCEDURE — 99397 PER PM REEVAL EST PAT 65+ YR: CPT | Performed by: INTERNAL MEDICINE

## 2019-05-24 RX ORDER — DIAZEPAM 5 MG/1
5 TABLET ORAL DAILY PRN
Qty: 30 TABLET | Refills: 0 | Status: SHIPPED | OUTPATIENT
Start: 2019-05-24 | End: 2019-12-03 | Stop reason: SDUPTHER

## 2019-05-24 RX ORDER — SULFAMETHOXAZOLE AND TRIMETHOPRIM 800; 160 MG/1; MG/1
1 TABLET ORAL EVERY 12 HOURS SCHEDULED
Qty: 30 TABLET | Refills: 3
Start: 2019-05-24 | End: 2019-12-03 | Stop reason: SDUPTHER

## 2019-05-24 RX ORDER — ZOLPIDEM TARTRATE 10 MG/1
10 TABLET ORAL
Qty: 30 TABLET | Refills: 0 | Status: SHIPPED | OUTPATIENT
Start: 2019-05-24 | End: 2019-12-03 | Stop reason: SDUPTHER

## 2019-05-28 ENCOUNTER — ANNUAL EXAM (OUTPATIENT)
Dept: GYNECOLOGY | Facility: CLINIC | Age: 64
End: 2019-05-28
Payer: COMMERCIAL

## 2019-05-28 VITALS
BODY MASS INDEX: 24.1 KG/M2 | DIASTOLIC BLOOD PRESSURE: 80 MMHG | HEIGHT: 63 IN | WEIGHT: 136 LBS | SYSTOLIC BLOOD PRESSURE: 132 MMHG | HEART RATE: 67 BPM

## 2019-05-28 DIAGNOSIS — Z12.31 ENCOUNTER FOR SCREENING MAMMOGRAM FOR MALIGNANT NEOPLASM OF BREAST: Primary | ICD-10-CM

## 2019-05-28 DIAGNOSIS — Z80.3 FAMILY HX-BREAST MALIGNANCY: ICD-10-CM

## 2019-05-28 DIAGNOSIS — M85.80 OSTEOPENIA, UNSPECIFIED LOCATION: ICD-10-CM

## 2019-05-28 DIAGNOSIS — Z01.419 ENCOUNTER FOR GYNECOLOGICAL EXAMINATION WITHOUT ABNORMAL FINDING: ICD-10-CM

## 2019-05-28 DIAGNOSIS — N95.2 ATROPHIC VAGINITIS: ICD-10-CM

## 2019-05-28 PROCEDURE — S0612 ANNUAL GYNECOLOGICAL EXAMINA: HCPCS | Performed by: OBSTETRICS & GYNECOLOGY

## 2019-05-28 RX ORDER — ESTRADIOL 0.1 MG/G
1 CREAM VAGINAL 2 TIMES WEEKLY
Qty: 42.5 G | Refills: 3 | Status: SHIPPED | OUTPATIENT
Start: 2019-05-30 | End: 2020-10-20 | Stop reason: SDUPTHER

## 2019-06-07 ENCOUNTER — TELEPHONE (OUTPATIENT)
Dept: INTERNAL MEDICINE CLINIC | Facility: CLINIC | Age: 64
End: 2019-06-07

## 2019-06-07 DIAGNOSIS — R79.89 ELEVATED SERUM CREATININE: Primary | ICD-10-CM

## 2019-06-07 LAB
25(OH)D3+25(OH)D2 SERPL-MCNC: 30.2 NG/ML (ref 30–100)
ALBUMIN SERPL-MCNC: 4.7 G/DL (ref 3.6–4.8)
ALBUMIN/GLOB SERPL: 1.8 {RATIO} (ref 1.2–2.2)
ALP SERPL-CCNC: 66 IU/L (ref 39–117)
ALT SERPL-CCNC: 18 IU/L (ref 0–32)
AST SERPL-CCNC: 20 IU/L (ref 0–40)
BASOPHILS # BLD AUTO: 0 X10E3/UL (ref 0–0.2)
BASOPHILS NFR BLD AUTO: 0 %
BILIRUB SERPL-MCNC: 0.4 MG/DL (ref 0–1.2)
BUN SERPL-MCNC: 19 MG/DL (ref 8–27)
BUN/CREAT SERPL: 18 (ref 12–28)
CALCIUM SERPL-MCNC: 9.8 MG/DL (ref 8.7–10.3)
CHLORIDE SERPL-SCNC: 103 MMOL/L (ref 96–106)
CHOLEST SERPL-MCNC: 200 MG/DL (ref 100–199)
CO2 SERPL-SCNC: 19 MMOL/L (ref 20–29)
CREAT SERPL-MCNC: 1.07 MG/DL (ref 0.57–1)
EOSINOPHIL # BLD AUTO: 0.3 X10E3/UL (ref 0–0.4)
EOSINOPHIL NFR BLD AUTO: 3 %
ERYTHROCYTE [DISTWIDTH] IN BLOOD BY AUTOMATED COUNT: 14.2 % (ref 12.3–15.4)
GLOBULIN SER-MCNC: 2.6 G/DL (ref 1.5–4.5)
GLUCOSE SERPL-MCNC: 98 MG/DL (ref 65–99)
HCT VFR BLD AUTO: 39.4 % (ref 34–46.6)
HCV AB S/CO SERPL IA: <0.1 S/CO RATIO (ref 0–0.9)
HDLC SERPL-MCNC: 68 MG/DL
HGB BLD-MCNC: 13 G/DL (ref 11.1–15.9)
IMM GRANULOCYTES # BLD: 0 X10E3/UL (ref 0–0.1)
IMM GRANULOCYTES NFR BLD: 0 %
LDLC SERPL CALC-MCNC: 109 MG/DL (ref 0–99)
LYMPHOCYTES # BLD AUTO: 2.8 X10E3/UL (ref 0.7–3.1)
LYMPHOCYTES NFR BLD AUTO: 34 %
MCH RBC QN AUTO: 29.1 PG (ref 26.6–33)
MCHC RBC AUTO-ENTMCNC: 33 G/DL (ref 31.5–35.7)
MCV RBC AUTO: 88 FL (ref 79–97)
MONOCYTES # BLD AUTO: 0.5 X10E3/UL (ref 0.1–0.9)
MONOCYTES NFR BLD AUTO: 7 %
NEUTROPHILS # BLD AUTO: 4.6 X10E3/UL (ref 1.4–7)
NEUTROPHILS NFR BLD AUTO: 56 %
PLATELET # BLD AUTO: 290 X10E3/UL (ref 150–450)
POTASSIUM SERPL-SCNC: 3.7 MMOL/L (ref 3.5–5.2)
PROT SERPL-MCNC: 7.3 G/DL (ref 6–8.5)
RBC # BLD AUTO: 4.47 X10E6/UL (ref 3.77–5.28)
SL AMB EGFR AFRICAN AMERICAN: 63 ML/MIN/1.73
SL AMB EGFR NON AFRICAN AMERICAN: 55 ML/MIN/1.73
SL AMB VLDL CHOLESTEROL CALC: 23 MG/DL (ref 5–40)
SODIUM SERPL-SCNC: 140 MMOL/L (ref 134–144)
TRIGL SERPL-MCNC: 113 MG/DL (ref 0–149)
WBC # BLD AUTO: 8.2 X10E3/UL (ref 3.4–10.8)

## 2019-06-15 LAB
CREAT SERPL-MCNC: 0.88 MG/DL (ref 0.57–1)
SL AMB EGFR AFRICAN AMERICAN: 80 ML/MIN/1.73
SL AMB EGFR NON AFRICAN AMERICAN: 69 ML/MIN/1.73

## 2019-06-17 ENCOUNTER — TELEPHONE (OUTPATIENT)
Dept: INTERNAL MEDICINE CLINIC | Facility: CLINIC | Age: 64
End: 2019-06-17

## 2019-10-08 ENCOUNTER — TELEPHONE (OUTPATIENT)
Dept: UROLOGY | Facility: MEDICAL CENTER | Age: 64
End: 2019-10-08

## 2019-10-08 NOTE — TELEPHONE ENCOUNTER
NEW INSURANCE Starting 11/1    Medicare:  Medicare # [id number]    [address] [facility]  Member #[id number]    Patient Requesting new CT order to be entered with update Insurance information    She can be reached at [phone]

## 2019-10-08 NOTE — TELEPHONE ENCOUNTER
I spoke to pt and informed her that new insurance could not be added to her account until it is active  I told her to present her cards at her first [facility] visit after they are active and they can be updated in the system  She stated she has medicare and aarp supplemental  I let her know medicare does not require preauth for ct

## 2019-11-12 ENCOUNTER — HOSPITAL ENCOUNTER (OUTPATIENT)
Dept: NON INVASIVE DIAGNOSTICS | Facility: CLINIC | Age: 64
Discharge: HOME | End: 2019-11-12
Payer: MEDICARE

## 2019-11-12 DIAGNOSIS — I34.0 MITRAL VALVE INSUFFICIENCY, UNSPECIFIED ETIOLOGY: ICD-10-CM

## 2019-11-12 PROCEDURE — 93306 TTE W/DOPPLER COMPLETE: CPT | Performed by: INTERNAL MEDICINE

## 2019-11-12 PROCEDURE — 93306 TTE W/DOPPLER COMPLETE: CPT

## 2019-11-22 ENCOUNTER — HOSPITAL ENCOUNTER (OUTPATIENT)
Dept: CT IMAGING | Facility: HOSPITAL | Age: 64
Discharge: HOME | End: 2019-11-22
Attending: UROLOGY
Payer: MEDICARE

## 2019-11-22 DIAGNOSIS — N20.0 NEPHROLITHIASIS: ICD-10-CM

## 2019-11-22 DIAGNOSIS — N28.1 RENAL CYST, ACQUIRED, LEFT: ICD-10-CM

## 2019-11-22 PROCEDURE — 74178 CT ABD&PLV WO CNTR FLWD CNTR: CPT

## 2019-11-22 RX ADMIN — IODIXANOL 100 ML: 320 INJECTION, SOLUTION INTRAVASCULAR at 10:32

## 2019-11-27 ENCOUNTER — TELEPHONE (OUTPATIENT)
Dept: UROLOGY | Facility: AMBULATORY SURGERY CENTER | Age: 64
End: 2019-11-27

## 2019-12-03 ENCOUNTER — OFFICE VISIT (OUTPATIENT)
Dept: INTERNAL MEDICINE CLINIC | Facility: CLINIC | Age: 64
End: 2019-12-03
Payer: MEDICARE

## 2019-12-03 VITALS
OXYGEN SATURATION: 98 % | WEIGHT: 138.8 LBS | SYSTOLIC BLOOD PRESSURE: 116 MMHG | HEIGHT: 63 IN | DIASTOLIC BLOOD PRESSURE: 70 MMHG | HEART RATE: 67 BPM | BODY MASS INDEX: 24.59 KG/M2

## 2019-12-03 DIAGNOSIS — G47.9 SLEEP DISTURBANCE: ICD-10-CM

## 2019-12-03 DIAGNOSIS — M85.80 OSTEOPENIA, UNSPECIFIED LOCATION: ICD-10-CM

## 2019-12-03 DIAGNOSIS — E78.49 OTHER HYPERLIPIDEMIA: ICD-10-CM

## 2019-12-03 DIAGNOSIS — E04.1 THYROID NODULE: ICD-10-CM

## 2019-12-03 DIAGNOSIS — Z00.00 MEDICARE WELCOME EXAM: Primary | ICD-10-CM

## 2019-12-03 DIAGNOSIS — Z13.6 SCREENING FOR CARDIOVASCULAR CONDITION: ICD-10-CM

## 2019-12-03 DIAGNOSIS — F41.1 GAD (GENERALIZED ANXIETY DISORDER): ICD-10-CM

## 2019-12-03 DIAGNOSIS — N39.0 RECURRENT UTI: ICD-10-CM

## 2019-12-03 PROBLEM — R94.31 ABNORMAL EKG: Status: RESOLVED | Noted: 2018-05-03 | Resolved: 2019-12-03

## 2019-12-03 PROCEDURE — G0403 EKG FOR INITIAL PREVENT EXAM: HCPCS | Performed by: INTERNAL MEDICINE

## 2019-12-03 PROCEDURE — G0402 INITIAL PREVENTIVE EXAM: HCPCS | Performed by: INTERNAL MEDICINE

## 2019-12-03 RX ORDER — DIAZEPAM 5 MG/1
5 TABLET ORAL DAILY PRN
Qty: 30 TABLET | Refills: 0 | Status: SHIPPED | OUTPATIENT
Start: 2019-12-03 | End: 2020-09-01 | Stop reason: SDUPTHER

## 2019-12-03 RX ORDER — SULFAMETHOXAZOLE AND TRIMETHOPRIM 800; 160 MG/1; MG/1
TABLET ORAL
Qty: 30 TABLET | Refills: 0 | Status: SHIPPED | OUTPATIENT
Start: 2019-12-03 | End: 2022-07-25 | Stop reason: SDUPTHER

## 2019-12-03 RX ORDER — ZOLPIDEM TARTRATE 10 MG/1
10 TABLET ORAL
Qty: 30 TABLET | Refills: 0 | Status: SHIPPED | OUTPATIENT
Start: 2019-12-03 | End: 2020-09-01 | Stop reason: SDUPTHER

## 2019-12-03 NOTE — PROGRESS NOTES
Assessment and Plan:     Problem List Items Addressed This Visit        Endocrine    Thyroid nodule     Based on last year's ultrasound, a 3 y repeat is recommended  She would like to have this done sooner  Order entered         Relevant Orders    US thyroid       Musculoskeletal and Integument    Osteopenia     Up to date with DXA scan  Continue calcium, D            Genitourinary    Recurrent UTI     Post coital and would like to have Bactrim that she can take PRN  She did not tolerate Macrobid         Relevant Medications    sulfamethoxazole-trimethoprim (BACTRIM DS) 800-160 mg per tablet       Other    Sleep disturbance    Relevant Medications    zolpidem (AMBIEN) 10 mg tablet    Screening for cardiovascular condition    Relevant Orders    POCT ECG (Completed)    PROSPER (generalized anxiety disorder)     Still considering restarting Buspar  She will call when she decides to start this         Relevant Medications    diazepam (VALIUM) 5 mg tablet    zolpidem (AMBIEN) 10 mg tablet    Other hyperlipidemia     Controlled on simvastatin         Relevant Orders    CBC and differential    Comprehensive metabolic panel    Lipid panel      Other Visit Diagnoses     Medicare welcome exam    -  Primary           Preventive health issues were discussed with patient, and age appropriate screening tests were ordered as noted in patient's After Visit Summary  Personalized health advice and appropriate referrals for health education or preventive services given if needed, as noted in patient's After Visit Summary  History of Present Illness:     Patient presents for Welcome to Medicare visit  Patient Care Team:  Louie Brasher MD as PCP - General (Internal Medicine)  Beau Talavera MD     Review of Systems:     Review of Systems   Constitutional: Negative for chills, fatigue, fever and unexpected weight change  HENT: Positive for congestion (occasional), postnasal drip and rhinorrhea   Negative for hearing loss, sinus pressure and sore throat  Respiratory: Positive for cough (occasional)  Negative for shortness of breath and wheezing  Cardiovascular: Negative for chest pain, palpitations and leg swelling  Gastrointestinal: Positive for diarrhea (with anxiety)  Negative for abdominal pain, constipation, nausea and vomiting  Genitourinary: Negative for difficulty urinating and dysuria  Musculoskeletal: Positive for back pain (right SI joint, chronic)  Neurological: Positive for headaches (occasional)  Negative for dizziness  Psychiatric/Behavioral: Positive for sleep disturbance (takes Ambien PRN)  Negative for dysphoric mood and suicidal ideas  The patient is nervous/anxious (thinking about restarting Buspar; takes Valium prm)         Problem List:     Patient Active Problem List   Diagnosis    Sleep disturbance    Screening for AAA (abdominal aortic aneurysm)    Screening for cardiovascular condition    PROSPER (generalized anxiety disorder)    Atrophic vaginitis    Chronic right-sided low back pain without sciatica    Eustachian tube disorder, bilateral    Insomnia, controlled    MVP (mitral valve prolapse)    Nephrolithiasis    Osteopenia    Other hyperlipidemia    Thyroid nodule    Recurrent UTI    Renal cyst, acquired, left    Glaucoma suspect of both eyes      Past Medical and Surgical History:     Past Medical History:   Diagnosis Date    Allergic     Anxiety     Recurrent UTI      Past Surgical History:   Procedure Laterality Date    CHOLECYSTECTOMY      HYSTERECTOMY N/A     uterus removed age 25   Beau Shasta LEG SURGERY Left     SALIVARY GLAND SURGERY Right     excision of parotid tumor/gland    SALPINGOOPHORECTOMY Bilateral     age 48   Beau Shasta TONSILLECTOMY        Family History:     Family History   Problem Relation Age of Onset    Kidney cancer Mother     Aneurysm Father         abdominal aortic aneurysm    Nephrolithiasis Father     Breast cancer Sister 47    Breast cancer Maternal Grandmother 72        approximate age   Emaline Folds Breast cancer Maternal Aunt 76    Stomach cancer Paternal Uncle     Hypertension Maternal Grandfather     Diabetes Maternal Grandfather     Sudden death Maternal Grandfather         cardiac    Polycystic ovary syndrome Daughter     Diabetes Paternal Grandfather     Coronary artery disease Neg Hx     Stroke Neg Hx     Arthritis Neg Hx     Hyperlipidemia Neg Hx       Social History:     Social History     Socioeconomic History    Marital status: /Civil Union     Spouse name: None    Number of children: 3    Years of education: None    Highest education level: None   Occupational History    None   Social Needs    Financial resource strain: None    Food insecurity:     Worry: None     Inability: None    Transportation needs:     Medical: None     Non-medical: None   Tobacco Use    Smoking status: Never Smoker    Smokeless tobacco: Never Used   Substance and Sexual Activity    Alcohol use: No    Drug use: No    Sexual activity: None   Lifestyle    Physical activity:     Days per week: None     Minutes per session: None    Stress: None   Relationships    Social connections:     Talks on phone: None     Gets together: None     Attends [employer] service: None     Active member of club or organization: None     Attends meetings of clubs or organizations: None     Relationship status: None    Intimate partner violence:     Fear of current or ex partner: None     Emotionally abused: None     Physically abused: None     Forced sexual activity: None   Other Topics Concern    None   Social History Narrative    None      Medications and Allergies:     Current Outpatient Medications   Medication Sig Dispense Refill    cholecalciferol (VITAMIN D3) 1,000 units tablet Take 1,000 Units by mouth daily        diazepam (VALIUM) 5 mg tablet Take 1 tablet (5 mg total) by mouth daily as needed for anxiety No driving with this  No alcohol with this   Do not combine with zolpidem 30 tablet 0    estradiol (ESTRACE VAGINAL) 0 1 mg/g vaginal cream Insert 1 g into the vagina 2 (two) times a week 42 5 g 3    Multiple Vitamins-Minerals (CENTRUM SILVER ADULT 50+) TABS Take 1 tablet by mouth daily        Probiotic Product (PROBIOTIC COLON SUPPORT) CAPS Take 1 capsule by mouth daily        simvastatin (ZOCOR) 40 mg tablet Take 1 tablet (40 mg total) by mouth daily 90 tablet 1    Vaginal Lubricant (REPLENS) GEL Insert into the vagina      zolpidem (AMBIEN) 10 mg tablet Take 1 tablet (10 mg total) by mouth daily at bedtime as needed for sleep 30 tablet 0    busPIRone (BUSPAR) 5 mg tablet Take 1 tablet (5 mg total) by mouth 2 (two) times a day for 30 days 60 tablet 1    sulfamethoxazole-trimethoprim (BACTRIM DS) 800-160 mg per tablet 1 dose post intercourse 30 tablet 0     No current facility-administered medications for this visit  Allergies   Allergen Reactions    Augmentin [Amoxicillin-Pot Clavulanate] GI Intolerance    Macrobid [Nitrofurantoin] GI Intolerance      Immunizations: There is no immunization history for the selected administration types on file for this patient  Health Maintenance:         Topic Date Due    MAMMOGRAM  12/06/2020    CRC Screening: Colonoscopy  08/27/2025    Hepatitis C Screening  Completed     There are no preventive care reminders to display for this patient  Medicare Screening Tests and Risk Assessments:     [facility] is here for her Welcome to Medicare visit  Health Risk Assessment:   Patient rates overall health as good  Patient feels that their physical health rating is same  Eyesight was rated as same  Hearing was rated as same  Patient feels that their emotional and mental health rating is slightly worse  Pain experienced in the last 7 days has been some  Patient's pain rating has been 4/10  Patient states that she has experienced no weight loss or gain in last 6 months   Right SI joint, chronic    Depression Screening:   PHQ-2 Score: 0      Fall Risk Screening: In the past year, patient has experienced: no history of falling in past year      Urinary Incontinence Screening:   Patient has not leaked urine accidently in the last six months  Home Safety:  Patient does not have trouble with stairs inside or outside of their home  Patient has working smoke alarms and has working carbon monoxide detector  Home safety hazards include: none  Nutrition:   Current diet is Regular, Frequent junk food and No Added Salt  Medications:   Patient is currently taking over-the-counter supplements  OTC medications include: see medication list  Patient is able to manage medications  Activities of Daily Living (ADLs)/Instrumental Activities of Daily Living (IADLs):   Walk and transfer into and out of bed and chair?: Yes  Dress and groom yourself?: Yes    Bathe or shower yourself?: Yes    Feed yourself?  Yes  Do your laundry/housekeeping?: Yes  Manage your money, pay your bills and track your expenses?: Yes  Make your own meals?: Yes    Do your own shopping?: Yes    Durable Medical Equipment Suppliers  none    Previous Hospitalizations:   Any hospitalizations or ED visits within the last 12 months?: No      Advance Care Planning:   Living will: No    Durable POA for healthcare: Yes      Cognitive Screening:   Provider or family/friend/caregiver concerned regarding cognition?: No    PREVENTIVE SCREENINGS      Cardiovascular Screening:    General: Screening Not Indicated and History Lipid Disorder      Diabetes Screening:     General: Screening Current      Colorectal Cancer Screening:     General: Screening Current      Breast Cancer Screening:     General: Screening Current      Cervical Cancer Screening:    General: Screening Not Indicated      Osteoporosis Screening:    General: Screening Current      Abdominal Aortic Aneurysm (AAA) Screening:        General: Screening Current      Lung Cancer Screening:     General: Screening Not Indicated      Hepatitis C Screening:    General: Screening Current     Visual Acuity Screening    Right eye Left eye Both eyes   Without correction:      With correction: 20/30 20/20 20/20        Physical Exam:     /70   Pulse 67   Ht 5' 3" (1 6 m)   Wt 63 kg (138 lb 12 8 oz)   SpO2 98%   BMI 24 59 kg/m²     Physical Exam   Constitutional: She is oriented to person, place, and time  She appears well-developed and well-nourished  HENT:   Head: Normocephalic and atraumatic  Right Ear: External ear normal    Left Ear: External ear normal    Mouth/Throat: Oropharynx is clear and moist    Eyes: Conjunctivae are normal    Neck: Neck supple  Cardiovascular: Normal rate, regular rhythm and normal heart sounds  No murmur heard  Pulmonary/Chest: Effort normal and breath sounds normal  No respiratory distress  She has no wheezes  She has no rales  Abdominal: Soft  She exhibits no distension and no mass  There is no tenderness  There is no rebound and no guarding  Neurological: She is alert and oriented to person, place, and time  Skin: Skin is warm and dry  Psychiatric: She has a normal mood and affect   Her behavior is normal  Judgment and thought content normal        Venessa López MD

## 2019-12-03 NOTE — ASSESSMENT & PLAN NOTE
Based on last year's ultrasound, a 3 y repeat is recommended  She would like to have this done sooner   Order entered

## 2019-12-11 ENCOUNTER — OFFICE VISIT (OUTPATIENT)
Dept: UROLOGY | Facility: CLINIC | Age: 64
End: 2019-12-11
Payer: MEDICARE

## 2019-12-11 VITALS
SYSTOLIC BLOOD PRESSURE: 128 MMHG | WEIGHT: 141 LBS | HEART RATE: 73 BPM | HEIGHT: 64 IN | BODY MASS INDEX: 24.07 KG/M2 | DIASTOLIC BLOOD PRESSURE: 74 MMHG

## 2019-12-11 DIAGNOSIS — N28.1 RENAL CYST, ACQUIRED, LEFT: ICD-10-CM

## 2019-12-11 DIAGNOSIS — N20.0 NEPHROLITHIASIS: Primary | ICD-10-CM

## 2019-12-11 PROCEDURE — 99214 OFFICE O/P EST MOD 30 MIN: CPT | Performed by: UROLOGY

## 2019-12-11 NOTE — PROGRESS NOTES
Referring Physician: Thomas Barth MD  A copy of this note was sent to the referring physician  Diagnoses and all orders for this visit:    Nephrolithiasis  -     Ambulatory referral to Nephrology; Future  -     CT abdomen pelvis w wo contrast; Future    Renal cyst, acquired, left            Assessment and plan:       1  Recurrent nephrolithiasis  -small intrarenal calculus asymptomatic, bilateral    2  Recurrent urinary tract infections, post coital  -doing well with probiotic and post coital Bactrim    3  Subcentimeter bilateral renal cysts  - benign features on ultrasound, stable in size  - familial history of metastatic renal cell cancer in her mother is noted    I reviewed the recent CT scan with Christen Lewis and her   From a cyst standpoint these appear to be benign  There is a small subcentimeter cyst in the right upper pole and a similar lesion in the left  The left-sided lesion is stable  Generally at classified these as Bosniak 2 F lesions and have recommended a period of surveillance over the next few years  Regarding her multifocal small bilateral renal stones I have recommended dietary modifications as well as referral to Nephrology for metabolic evaluation  She will return in 1 year with a repeat CT with without contrast both monitor for stone status as well as the status of her cyst   If things are stable we will follow her with x-ray and ultrasound on annual basis thereafter  In addition she knows to contact directly with any acute stone concerns in the interim    Percy Smith MD      Chief Complaint     Stone UTI follow-up      History of Present Illness     Marylee Berber is a 75 y o  female returns in follow-up for recurrent stone disease as well as renal cysts  He did pass a stone while traveling over the past year  She did well with medical expulsive therapy with this  She has had no hematuria or bothersome UTIs    She presents with an updated CT scan which demonstrates approximately 5 small stones between the right and left kidney  No hydronephrosis is noted  She denies any flank pain or gross hematuria  She is accompanied by her  today  Detailed Urologic History     - please refer to HPI    Review of Systems     Review of Systems   Constitutional: Negative for activity change and fatigue  HENT: Negative for congestion  Eyes: Negative for visual disturbance  Respiratory: Negative for shortness of breath and wheezing  Cardiovascular: Negative for chest pain and leg swelling  Gastrointestinal: Negative for abdominal pain  Endocrine: Negative for polyuria  Genitourinary: Negative for dysuria, flank pain, hematuria and urgency  Musculoskeletal: Negative for back pain  Allergic/Immunologic: Negative for immunocompromised state  Neurological: Negative for dizziness and numbness  Psychiatric/Behavioral: Negative for dysphoric mood  All other systems reviewed and are negative  Allergies     Allergies   Allergen Reactions    Augmentin [Amoxicillin-Pot Clavulanate] GI Intolerance    Macrobid [Nitrofurantoin] GI Intolerance       Physical Exam     Physical Exam   Constitutional: She is oriented to person, place, and time  She appears well-developed  HENT:   Head: Normocephalic and atraumatic  Eyes: Pupils are equal, round, and reactive to light  Neck: Normal range of motion  Cardiovascular:   Negative lower extremity edema   Pulmonary/Chest: Effort normal and breath sounds normal    Abdominal: Soft  Genitourinary:   Genitourinary Comments: Negative flank pain   Musculoskeletal: Normal range of motion  Neurological: She is alert and oriented to person, place, and time  Skin: Skin is warm  Psychiatric: She has a normal mood and affect             Vital Signs  Vitals:    12/11/19 1030   BP: 128/74   BP Location: Left arm   Patient Position: Sitting   Cuff Size: Adult   Pulse: 73   Weight: 64 kg (141 lb)   Height: 5' 3 5" (1 613 m)         Current Medications       Current Outpatient Medications:     cholecalciferol (VITAMIN D3) 1,000 units tablet, Take 1,000 Units by mouth daily  , Disp: , Rfl:     diazepam (VALIUM) 5 mg tablet, Take 1 tablet (5 mg total) by mouth daily as needed for anxiety No driving with this  No alcohol with this   Do not combine with zolpidem, Disp: 30 tablet, Rfl: 0    estradiol (ESTRACE VAGINAL) 0 1 mg/g vaginal cream, Insert 1 g into the vagina 2 (two) times a week, Disp: 42 5 g, Rfl: 3    Multiple Vitamins-Minerals (CENTRUM SILVER ADULT 50+) TABS, Take 1 tablet by mouth daily  , Disp: , Rfl:     Probiotic Product (PROBIOTIC COLON SUPPORT) CAPS, Take 1 capsule by mouth daily  , Disp: , Rfl:     simvastatin (ZOCOR) 40 mg tablet, Take 1 tablet (40 mg total) by mouth daily, Disp: 90 tablet, Rfl: 1    sulfamethoxazole-trimethoprim (BACTRIM DS) 800-160 mg per tablet, 1 dose post intercourse, Disp: 30 tablet, Rfl: 0    Vaginal Lubricant (REPLENS) GEL, Insert into the vagina, Disp: , Rfl:     zolpidem (AMBIEN) 10 mg tablet, Take 1 tablet (10 mg total) by mouth daily at bedtime as needed for sleep, Disp: 30 tablet, Rfl: 0    busPIRone (BUSPAR) 5 mg tablet, Take 1 tablet (5 mg total) by mouth 2 (two) times a day for 30 days, Disp: 60 tablet, Rfl: 1      Active Problems     Patient Active Problem List   Diagnosis    Sleep disturbance    Screening for AAA (abdominal aortic aneurysm)    Screening for cardiovascular condition    PROSPER (generalized anxiety disorder)    Atrophic vaginitis    Chronic right-sided low back pain without sciatica    Eustachian tube disorder, bilateral    Insomnia, controlled    MVP (mitral valve prolapse)    Nephrolithiasis    Osteopenia    Other hyperlipidemia    Thyroid nodule    Recurrent UTI    Renal cyst, acquired, left    Glaucoma suspect of both eyes         Past Medical History     Past Medical History:   Diagnosis Date    Allergic     Anxiety     Recurrent UTI          Surgical History     Past Surgical History:   Procedure Laterality Date    CHOLECYSTECTOMY      HYSTERECTOMY N/A     uterus removed age 25   Clay Kai LEG SURGERY Left     SALIVARY GLAND SURGERY Right     excision of parotid tumor/gland    SALPINGOOPHORECTOMY Bilateral     age 48   Clay Kai TONSILLECTOMY           Family History     Family History   Problem Relation Age of Onset    Kidney cancer Mother     Aneurysm Father         abdominal aortic aneurysm    Nephrolithiasis Father     Breast cancer Sister 47    Breast cancer Maternal Grandmother 72        approximate age   Clay Kai Breast cancer Maternal Aunt 76    Stomach cancer Paternal Uncle     Hypertension Maternal Grandfather     Diabetes Maternal Grandfather     Sudden death Maternal Grandfather         cardiac    Polycystic ovary syndrome Daughter     Diabetes Paternal Grandfather     Coronary artery disease Neg Hx     Stroke Neg Hx     Arthritis Neg Hx     Hyperlipidemia Neg Hx          Social History     Social History     Social History     Tobacco Use   Smoking Status Never Smoker   Smokeless Tobacco Never Used         Pertinent Lab Values     Lab Results   Component Value Date    CREATININE 0 88 06/14/2019       No results found for: PSA    @RESULTRCNT(1H])@      Pertinent Imaging      FINDINGS:     ABDOMEN     RIGHT KIDNEY AND URETER:  No solid renal mass  No detectable urothelial mass  A few sub-5 mm hypodensities are too small to further characterize  No hydronephrosis or hydroureter  A few nonobstructing calculi are noted measuring 2-4 mm  No perinephric collection      LEFT KIDNEY AND URETER:  No solid renal mass  No detectable urothelial mass  A few sub-5 mm hypodensities are too small to further characterize  No hydronephrosis or hydroureter  Several nonobstructing calculi are noted measuring 2-6 mm  No perinephric collection      URINARY BLADDER:  No bladder wall mass    No calculi         LOWER CHEST:  No clinically significant abnormality identified in the visualized lower chest      LIVER/BILIARY TREE: Dilated CBD measuring up to 9 mm likely related to cholecystectomy  No focal hepatic lesion  Hepatic configuration is normal      GALLBLADDER:  Surgically absent     SPLEEN:  Unremarkable      PANCREAS:  Unremarkable      ADRENAL GLANDS:  Unremarkable      STOMACH AND BOWEL:  Sigmoid diverticulosis  No disproportionate dilation of small or large bowel loops      ABDOMINOPELVIC CAVITY:  No ascites  No free intraperitoneal air  No lymphadenopathy      VESSELS:  Unremarkable for patient's age      PELVIS     REPRODUCTIVE ORGANS:  Unremarkable for patient's age      APPENDIX: No findings to suggest appendicitis      ABDOMINAL WALL/INGUINAL REGIONS:  Small fat-containing umbilical hernia      OSSEOUS STRUCTURES:  No acute fracture or destructive osseous lesion      IMPRESSION:     1  Several bilateral nonobstructing renal calculi measuring 2-6 mm      2  No ureteral or bladder calculi      3  No obstructive uropathy      4  No suspicious renal or urothelial lesion  Portions of the record may have been created with voice recognition software   Occasional wrong word or "sound a like" substitutions may have occurred due to the inherent limitations of voice recognition software   Read the chart carefully and recognize, using context, where substitutions have occurred

## 2020-01-07 DIAGNOSIS — E78.49 OTHER HYPERLIPIDEMIA: Primary | ICD-10-CM

## 2020-01-07 LAB
ALBUMIN SERPL-MCNC: 4.7 G/DL (ref 3.6–4.8)
ALBUMIN/GLOB SERPL: 2 {RATIO} (ref 1.2–2.2)
ALP SERPL-CCNC: 75 IU/L (ref 39–117)
ALT SERPL-CCNC: 15 IU/L (ref 0–32)
AST SERPL-CCNC: 15 IU/L (ref 0–40)
BASOPHILS # BLD AUTO: 0 X10E3/UL (ref 0–0.2)
BASOPHILS NFR BLD AUTO: 0 %
BILIRUB SERPL-MCNC: 0.5 MG/DL (ref 0–1.2)
BUN SERPL-MCNC: 26 MG/DL (ref 8–27)
BUN/CREAT SERPL: 30 (ref 12–28)
CALCIUM SERPL-MCNC: 9.7 MG/DL (ref 8.7–10.3)
CHLORIDE SERPL-SCNC: 103 MMOL/L (ref 96–106)
CHOLEST SERPL-MCNC: 219 MG/DL (ref 100–199)
CO2 SERPL-SCNC: 19 MMOL/L (ref 20–29)
CREAT SERPL-MCNC: 0.87 MG/DL (ref 0.57–1)
EOSINOPHIL # BLD AUTO: 0.1 X10E3/UL (ref 0–0.4)
EOSINOPHIL NFR BLD AUTO: 1 %
ERYTHROCYTE [DISTWIDTH] IN BLOOD BY AUTOMATED COUNT: 14.5 % (ref 11.7–15.4)
GLOBULIN SER-MCNC: 2.4 G/DL (ref 1.5–4.5)
GLUCOSE SERPL-MCNC: 92 MG/DL (ref 65–99)
HCT VFR BLD AUTO: 41.5 % (ref 34–46.6)
HDLC SERPL-MCNC: 58 MG/DL
HGB BLD-MCNC: 14 G/DL (ref 11.1–15.9)
IMM GRANULOCYTES # BLD: 0 X10E3/UL (ref 0–0.1)
IMM GRANULOCYTES NFR BLD: 0 %
LDLC SERPL CALC-MCNC: 141 MG/DL (ref 0–99)
LYMPHOCYTES # BLD AUTO: 2.8 X10E3/UL (ref 0.7–3.1)
LYMPHOCYTES NFR BLD AUTO: 34 %
MCH RBC QN AUTO: 29.7 PG (ref 26.6–33)
MCHC RBC AUTO-ENTMCNC: 33.7 G/DL (ref 31.5–35.7)
MCV RBC AUTO: 88 FL (ref 79–97)
MONOCYTES # BLD AUTO: 0.6 X10E3/UL (ref 0.1–0.9)
MONOCYTES NFR BLD AUTO: 7 %
MORPHOLOGY BLD-IMP: NORMAL
NEUTROPHILS # BLD AUTO: 4.8 X10E3/UL (ref 1.4–7)
NEUTROPHILS NFR BLD AUTO: 58 %
PLATELET # BLD AUTO: 305 X10E3/UL (ref 150–450)
POTASSIUM SERPL-SCNC: 4.1 MMOL/L (ref 3.5–5.2)
PROT SERPL-MCNC: 7.1 G/DL (ref 6–8.5)
RBC # BLD AUTO: 4.72 X10E6/UL (ref 3.77–5.28)
SL AMB EGFR AFRICAN AMERICAN: 80 ML/MIN/1.73
SL AMB EGFR NON AFRICAN AMERICAN: 70 ML/MIN/1.73
SL AMB VLDL CHOLESTEROL CALC: 20 MG/DL (ref 5–40)
SODIUM SERPL-SCNC: 141 MMOL/L (ref 134–144)
TRIGL SERPL-MCNC: 99 MG/DL (ref 0–149)
WBC # BLD AUTO: 8.4 X10E3/UL (ref 3.4–10.8)

## 2020-01-15 ENCOUNTER — OFFICE VISIT (OUTPATIENT)
Dept: INTERNAL MEDICINE CLINIC | Facility: CLINIC | Age: 65
End: 2020-01-15
Payer: MEDICARE

## 2020-01-15 VITALS
TEMPERATURE: 98.8 F | WEIGHT: 137.6 LBS | BODY MASS INDEX: 23.49 KG/M2 | SYSTOLIC BLOOD PRESSURE: 128 MMHG | HEIGHT: 64 IN | HEART RATE: 62 BPM | DIASTOLIC BLOOD PRESSURE: 76 MMHG

## 2020-01-15 DIAGNOSIS — H92.02 ACUTE EAR PAIN, LEFT: ICD-10-CM

## 2020-01-15 DIAGNOSIS — B34.9 VIRAL ILLNESS: Primary | ICD-10-CM

## 2020-01-15 PROCEDURE — 99213 OFFICE O/P EST LOW 20 MIN: CPT | Performed by: INTERNAL MEDICINE

## 2020-01-15 NOTE — PROGRESS NOTES
Assessment/Plan:  Pt reassured  Ibuprofen PRN  Call id symptoms worsen and persist     Problem List Items Addressed This Visit     None      Visit Diagnoses     Viral illness    -  Primary    Acute ear pain, left                Subjective:      Patient ID: Ron Bustillo is a 75 y o  female  HPI  2 days ago started with a mild cold with fatigue  Now with left ear pain  No cough, sinus congestion, drainage, nausea, vomiting, diarrhea    The following portions of the patient's history were reviewed and updated as appropriate: allergies, past family history, past medical history, past social history, past surgical history and problem list     Review of Systems   Constitutional: Positive for fatigue  Negative for chills and fever  HENT: Positive for ear pain  Negative for congestion, hearing loss, sinus pressure, sinus pain and sore throat  Respiratory: Negative for cough, shortness of breath and wheezing  Cardiovascular: Negative for chest pain, palpitations and leg swelling  Gastrointestinal: Negative for abdominal pain, constipation, diarrhea, nausea and vomiting  Objective:      /76   Pulse 62   Temp 98 8 °F (37 1 °C)   Ht 5' 3 5" (1 613 m)   Wt 62 4 kg (137 lb 9 6 oz)   BMI 23 99 kg/m²          Physical Exam   Constitutional: She is oriented to person, place, and time  She appears well-developed and well-nourished  HENT:   Head: Normocephalic and atraumatic  Right Ear: External ear normal    Left Ear: External ear normal    Mouth/Throat: Oropharynx is clear and moist    Eyes: Conjunctivae are normal    Neck: Neck supple  Cardiovascular: Normal rate, regular rhythm and normal heart sounds  No murmur heard  Pulmonary/Chest: Effort normal and breath sounds normal  No respiratory distress  She has no wheezes  She has no rales  Neurological: She is alert and oriented to person, place, and time  Skin: Skin is warm and dry  Psychiatric: She has a normal mood and affect   Her behavior is normal  Judgment and thought content normal

## 2020-01-21 ENCOUNTER — CONSULT (OUTPATIENT)
Dept: NEPHROLOGY | Facility: CLINIC | Age: 65
End: 2020-01-21
Payer: MEDICARE

## 2020-01-21 VITALS
DIASTOLIC BLOOD PRESSURE: 80 MMHG | BODY MASS INDEX: 23.97 KG/M2 | HEIGHT: 64 IN | SYSTOLIC BLOOD PRESSURE: 128 MMHG | WEIGHT: 140.4 LBS

## 2020-01-21 DIAGNOSIS — N20.0 NEPHROLITHIASIS: Primary | ICD-10-CM

## 2020-01-21 LAB
SL AMB  POCT GLUCOSE, UA: ABNORMAL
SL AMB LEUKOCYTE ESTERASE,UA: ABNORMAL
SL AMB POCT BILIRUBIN,UA: ABNORMAL
SL AMB POCT BLOOD,UA: 50
SL AMB POCT CLARITY,UA: CLEAR
SL AMB POCT COLOR,UA: YELLOW
SL AMB POCT KETONES,UA: ABNORMAL
SL AMB POCT NITRITE,UA: ABNORMAL
SL AMB POCT PH,UA: 6
SL AMB POCT SPECIFIC GRAVITY,UA: 1.01
SL AMB POCT URINE PROTEIN: ABNORMAL
SL AMB POCT UROBILINOGEN: ABNORMAL

## 2020-01-21 PROCEDURE — 81002 URINALYSIS NONAUTO W/O SCOPE: CPT | Performed by: INTERNAL MEDICINE

## 2020-01-21 PROCEDURE — 99204 OFFICE O/P NEW MOD 45 MIN: CPT | Performed by: INTERNAL MEDICINE

## 2020-01-21 NOTE — PROGRESS NOTES
Consultation - Nephrology   Bridget Pena 75 y o  female MRN: [id number]  Unit/Bed#:  Encounter: [id number]      Assessment/Plan     Assessment / Plan:  1  Nephrolithiasis  The patient has history of 1 clinical stone event but is found and known to have multiple stones in both kidneys so she has been referred for evaluation  She really does not carry any chronic diseases or conditions that would predispose her to kidney stones although there is some family history  At this point I told her we need to initiate the evaluation with a 24 hour urine stone collection to assess her risk parameters  She likely has calcium containing stones as these are the most common  Based upon the results of the collection will determine medical and or dietary therapy that may be necessary to reduce stone risk and I will call her with the results  24 hour urine stone risk  I spent the visit instructing informing her and her  about the pathophysiology of kidney stone formation the different kinds and the potential different treatments that would be potentially necessary based on the urinary findings  Her renal function and calcium are normal   The patient did have a low bicarbonate earlier this month  She was having little diarrhea and I did see some normal bicarbonates in the past   I only bring this up because if patient's have a distal RTA which would show a non gap metabolic acidosis with elevated urinary pH is generally above 6 because they cannot acidify the urine there predispose to calcium phosphate stones  If these people developed kidney stones they do often have hypercalciuria and hypocitraturia so will await the urine collection and monitor labs as well        History of Present Illness   Physician Requesting Consult: No att  providers found  Reason for Consult / Principal Problem:  Nephrolithiasis  Hx and PE limited by:   HPI: Bridget Pena is a 75y o  year old female who presents for her initial evaluation for kidney stone prevention  She states that when she was in her 25s she was found to have asymptomatic kidney stones and never had an event  It was never evaluated but then a few years ago in 2017 she had a 1st clinical event and was able to pass the stone although was not collected  She recently saw Urology regarding frequent urinary tract infections and based on her kidney stone history imaging revealed multiple stones in both kidneys and she has been referred for evaluation  History obtained from chart review and the patient  Consults    Review of Systems   Constitutional: Negative for appetite change, chills, fatigue and fever  HENT: Negative  Eyes: Negative  Respiratory: Negative  Negative for cough, chest tightness, shortness of breath and wheezing  Cardiovascular: Negative  Negative for chest pain, palpitations and leg swelling  Gastrointestinal: Negative  Negative for abdominal distention, abdominal pain, diarrhea, nausea and vomiting  Genitourinary: Negative  Negative for difficulty urinating, dysuria, flank pain and hematuria  Musculoskeletal: Negative  Neurological: Negative  Psychiatric/Behavioral: Negative          Historical Information   Patient Active Problem List   Diagnosis    Sleep disturbance    Screening for AAA (abdominal aortic aneurysm)    Screening for cardiovascular condition    PROSPER (generalized anxiety disorder)    Atrophic vaginitis    Chronic right-sided low back pain without sciatica    Eustachian tube disorder, bilateral    Insomnia, controlled    MVP (mitral valve prolapse)    Nephrolithiasis    Osteopenia    Other hyperlipidemia    Thyroid nodule    Recurrent UTI    Renal cyst, acquired, left    Glaucoma suspect of both eyes     Past Medical History:   Diagnosis Date    Allergic     Anxiety     Chronic kidney disease     Hyperlipidemia     Recurrent UTI     Tonsillitis     No history of inflammatory bowel disease no cardiac disease no myocardial infarction no cancer no stroke  Past Surgical History:   Procedure Laterality Date    CHOLECYSTECTOMY      CHOLECYSTECTOMY OPEN      HYSTERECTOMY N/A     uterus [facility] age 25   [facility]      LEG SURGERY Left     OOPHORECTOMY      SALIVARY GLAND SURGERY Right     excision of parotid tumor/gland    SALPINGOOPHORECTOMY Bilateral     age 48   [facility] TONSILLECTOMY       Social History   Social History     Substance and Sexual Activity   Alcohol Use No     Social History     Substance and Sexual Activity   Drug Use No     Social History     Tobacco Use   Smoking Status Never Smoker   Smokeless Tobacco Never Used     Family History   Problem Relation Age of Onset    Kidney cancer Mother     Aneurysm Father         abdominal aortic aneurysm    Nephrolithiasis Father     Breast cancer Sister 47    Breast cancer Maternal Grandmother 72        approximate age   [facility] Breast cancer Maternal Aunt 76    Stomach cancer Paternal Uncle     Hypertension Maternal Grandfather     Diabetes Maternal Grandfather     Sudden death Maternal Grandfather         cardiac    Polycystic ovary syndrome Daughter     Diabetes Paternal Grandfather     Coronary artery disease Neg Hx     Stroke Neg Hx     Arthritis Neg Hx     Hyperlipidemia Neg Hx        Meds/Allergies   current meds:   No current facility-administered medications for this visit  Allergies   Allergen Reactions    Augmentin [Amoxicillin-Pot Clavulanate] GI Intolerance    Macrobid [Nitrofurantoin] GI Intolerance       Objective   [unfilled]  Body mass index is 24 48 kg/m²  Invasive Devices:        PHYSICAL EXAM:  /80 (BP Location: Left arm, Patient Position: Sitting, Cuff Size: Standard)   Ht 5' 3 5" (1 613 m)   Wt 63 7 kg (140 lb 6 4 oz)   BMI 24 48 kg/m²     Physical Exam   Constitutional: She is oriented to person, place, and time  She appears well-developed and well-nourished  No distress     HENT:   Head: Atraumatic  Mouth/Throat: Oropharynx is clear and moist    Eyes: Pupils are equal, round, and reactive to light  Conjunctivae and EOM are normal  No scleral icterus  Neck: Neck supple  No JVD present  Cardiovascular: Normal rate and regular rhythm  Exam reveals no gallop and no friction rub  No edema  Pulmonary/Chest: Effort normal and breath sounds normal  No respiratory distress  She has no wheezes  She has no rales  Abdominal: Soft  Bowel sounds are normal  She exhibits no distension  There is no tenderness  There is no rebound  Neurological: She is alert and oriented to person, place, and time  Psychiatric: She has a normal mood and affect           Current Weight: Weight - Scale: 63 7 kg (140 lb 6 4 oz)  First Weight: Weight - Scale: 63 7 kg (140 lb 6 4 oz)    Lab Results:              Invalid input(s): LABGLOM        Invalid input(s): LABALBU

## 2020-01-21 NOTE — LETTER
January 21, 2020     Huyen Alatorre MD  [address] [address]  [address] [place] [address]    Patient: Melissa Mendez   YOB: 1955   Date of Visit: 1/21/2020       Dear Dr Ofelia Wilkinson: Thank you for referring Melissa Mendez to me for evaluation  Below are my notes for this consultation  If you have questions, please do not hesitate to call me  I look forward to following your patient along with you  Sincerely,        Suzan Foreman MD        CC: MD Suzan Kendall MD  1/21/2020  2:16 PM  Sign at close encounter  Consultation - Nephrology   eMlissa Mendez 75 y o  female MRN: [id number]  Unit/Bed#:  Encounter: [id number]      Assessment/Plan     Assessment / Plan:  1  Nephrolithiasis  The patient has history of 1 clinical stone event but is found and known to have multiple stones in both kidneys so she has been referred for evaluation  She really does not carry any chronic diseases or conditions that would predispose her to kidney stones although there is some family history  At this point I told her we need to initiate the evaluation with a 24 hour urine stone collection to assess her risk parameters  She likely has calcium containing stones as these are the most common  Based upon the results of the collection will determine medical and or dietary therapy that may be necessary to reduce stone risk and I will call her with the results  24 hour urine stone risk  I spent the visit instructing informing her and her  about the pathophysiology of kidney stone formation the different kinds and the potential different treatments that would be potentially necessary based on the urinary findings  Her renal function and calcium are normal   The patient did have a low bicarbonate earlier this month    She was having little diarrhea and I did see some normal bicarbonates in the past   I only bring this up because if patient's have a distal RTA which would show a non gap metabolic acidosis with elevated urinary pH is generally above 6 because they cannot acidify the urine there predispose to calcium phosphate stones  If these people developed kidney stones they do often have hypercalciuria and hypocitraturia so will await the urine collection and monitor labs as well  History of Present Illness   Physician Requesting Consult: No att  providers found  Reason for Consult / Principal Problem:  Nephrolithiasis  Hx and PE limited by:   HPI: Vahid Carrizales is a 75y o  year old female who presents for her initial evaluation for kidney stone prevention  She states that when she was in her 25s she was found to have asymptomatic kidney stones and never had an event  It was never evaluated but then a few years ago in 2017 she had a 1st clinical event and was able to pass the stone although was not collected  She recently saw Urology regarding frequent urinary tract infections and based on her kidney stone history imaging revealed multiple stones in both kidneys and she has been referred for evaluation  History obtained from chart review and the patient  Consults    Review of Systems   Constitutional: Negative for appetite change, chills, fatigue and fever  HENT: Negative  Eyes: Negative  Respiratory: Negative  Negative for cough, chest tightness, shortness of breath and wheezing  Cardiovascular: Negative  Negative for chest pain, palpitations and leg swelling  Gastrointestinal: Negative  Negative for abdominal distention, abdominal pain, diarrhea, nausea and vomiting  Genitourinary: Negative  Negative for difficulty urinating, dysuria, flank pain and hematuria  Musculoskeletal: Negative  Neurological: Negative  Psychiatric/Behavioral: Negative          Historical Information   Patient Active Problem List   Diagnosis    Sleep disturbance    Screening for AAA (abdominal aortic aneurysm)    Screening for cardiovascular condition    PROSPER (generalized anxiety disorder)    Atrophic vaginitis    Chronic right-sided low back pain without sciatica    Eustachian tube disorder, bilateral    Insomnia, controlled    MVP (mitral valve prolapse)    Nephrolithiasis    Osteopenia    Other hyperlipidemia    Thyroid nodule    Recurrent UTI    Renal cyst, acquired, left    Glaucoma suspect of both eyes     Past Medical History:   Diagnosis Date    Allergic     Anxiety     Chronic kidney disease     Hyperlipidemia     Recurrent UTI     Tonsillitis     No history of inflammatory bowel disease no cardiac disease no myocardial infarction no cancer no stroke  Past Surgical History:   Procedure Laterality Date    CHOLECYSTECTOMY      CHOLECYSTECTOMY OPEN      HYSTERECTOMY N/A     uterus removed age 25   [facility] HYSTERECTOMY      LEG SURGERY Left     OOPHORECTOMY      SALIVARY GLAND SURGERY Right     excision of parotid tumor/gland    SALPINGOOPHORECTOMY Bilateral     age 48   [facility] TONSILLECTOMY       Social History   Social History     Substance and Sexual Activity   Alcohol Use No     Social History     Substance and Sexual Activity   Drug Use No     Social History     Tobacco Use   Smoking Status Never Smoker   Smokeless Tobacco Never Used     Family History   Problem Relation Age of Onset    Kidney cancer Mother     Aneurysm Father         abdominal aortic aneurysm    Nephrolithiasis Father     Breast cancer Sister 47    Breast cancer Maternal Grandmother 72        approximate age   [facility] Breast cancer Maternal Aunt 76    Stomach cancer Paternal Uncle     Hypertension Maternal Grandfather     Diabetes Maternal Grandfather     Sudden death Maternal Grandfather         cardiac    Polycystic ovary syndrome Daughter     Diabetes Paternal Grandfather     Coronary artery disease Neg Hx     Stroke Neg Hx     Arthritis Neg Hx     Hyperlipidemia Neg Hx        Meds/Allergies   current meds:   No current facility-administered medications for this visit  Allergies   Allergen Reactions    Augmentin [Amoxicillin-Pot Clavulanate] GI Intolerance    Macrobid [Nitrofurantoin] GI Intolerance       Objective   [unfilled]  Body mass index is 24 48 kg/m²  Invasive Devices:        PHYSICAL EXAM:  /80 (BP Location: Left arm, Patient Position: Sitting, Cuff Size: Standard)   Ht 5' 3 5" (1 613 m)   Wt 63 7 kg (140 lb 6 4 oz)   BMI 24 48 kg/m²      Physical Exam   Constitutional: She is oriented to person, place, and time  She appears well-developed and well-nourished  No distress  HENT:   Head: Atraumatic  Mouth/Throat: Oropharynx is clear and moist    Eyes: Pupils are equal, round, and reactive to light  Conjunctivae and EOM are normal  No scleral icterus  Neck: Neck supple  No JVD present  Cardiovascular: Normal rate and regular rhythm  Exam reveals no gallop and no friction rub  No edema  Pulmonary/Chest: Effort normal and breath sounds normal  No respiratory distress  She has no wheezes  She has no rales  Abdominal: Soft  Bowel sounds are normal  She exhibits no distension  There is no tenderness  There is no rebound  Neurological: She is alert and oriented to person, place, and time  Psychiatric: She has a normal mood and affect           Current Weight: Weight - Scale: 63 7 kg (140 lb 6 4 oz)  First Weight: Weight - Scale: 63 7 kg (140 lb 6 4 oz)    Lab Results:              Invalid input(s): LABGLOM        Invalid input(s): LABALBU

## 2020-01-21 NOTE — PATIENT INSTRUCTIONS
You are here for your initial evaluation for kidney stone prevention  You gave me a very nice history so I appreciate in brought in the records  The odds are you of calcium containing stones and you know summer present now  To evaluate the cause we are 1st going to do urine collection  The 1st day your going to start you can flush the morning void save all day and night the 2nd day when her just about done save your 1st morning void then returned to the lab  I will call you with the results discussed diet and or medical therapy and then give you instructions from then

## 2020-02-05 ENCOUNTER — TELEPHONE (OUTPATIENT)
Dept: NEPHROLOGY | Facility: CLINIC | Age: 65
End: 2020-02-05

## 2020-02-05 DIAGNOSIS — N20.0 NEPHROLITHIASIS: Primary | ICD-10-CM

## 2020-02-05 LAB
AMMONIA 24H UR-MRATE: 13 MEQ/24 HR
AMMONIA UR-SCNC: ABNORMAL UG/DL
CA H2 PHOS DIHYD CRY URNS QL MICRO: 1.96 RATIO (ref 0–3)
CALCIUM 24H UR-MCNC: 13.6 MG/DL
CALCIUM 24H UR-MRATE: 204 MG/24 HR (ref 100–300)
CHLORIDE 24H UR-SCNC: 82 MMOL/L
CHLORIDE 24H UR-SRATE: 123 MMOL/24 HR (ref 110–250)
CITRATE 24H UR-MCNC: 154 MG/L
CITRATE 24H UR-MRATE: 231 MG/24 HR (ref 320–1240)
COM CRY STONE QL IR: 5.83 RATIO (ref 0–6)
CREAT 24H UR-MCNC: 39.9 MG/DL
CREAT 24H UR-MRATE: 598.5 MG/24 HR (ref 800–1800)
CYSTINE 24H UR-MCNC: 2.7 MG/L
CYSTINE 24H UR-MRATE: 4.05 MG/24 HR (ref 10–100)
MAGNESIUM 24H UR-MRATE: 38 MG/24 HR (ref 12–293)
MAGNESIUM UR-MCNC: 2.5 MG/DL
NA URATE CRY STONE QL IR: 1.92 RATIO (ref 0–4)
OSMOLALITY UR: 359 MOSMOL/KG (ref 300–900)
OXALATE 24H UR-MRATE: 18 MG/24 HR (ref 4–31)
OXALATE UR-MCNC: 12 MG/L
PH 24H UR: 6.3 [PH]
PHOSPHATE 24H UR-MCNC: 29.8 MG/DL
PHOSPHATE 24H UR-MRATE: 447 MG/24 HR (ref 400–1300)
PLEASE NOTE (STONE RISK): ABNORMAL
POTASSIUM 24H UR-SCNC: 26.7 MMOL/24 HR (ref 25–125)
POTASSIUM UR-SCNC: 17.8 MMOL/L
PRESERVED URINE: 1500 ML/24 HR (ref 600–1600)
SODIUM 24H UR-SCNC: 94 MMOL/L
SODIUM 24H UR-SRATE: 141 MMOL/24 HR (ref 39–258)
SPECIMEN VOL 24H UR: 1500 ML/24 HR (ref 600–1600)
SULFATE 24H UR-MCNC: 12 MEQ/24 HR (ref 0–30)
SULFATE UR-MCNC: 8 MEQ/L
TRI-PHOS CRY STONE MICRO: 0.01 RATIO (ref 0–1)
URATE 24H UR-MCNC: 17.9 MG/DL
URATE 24H UR-MRATE: 269 MG/24 HR (ref 250–750)
URATE DIHYD CRY STONE QL IR: 0.4 RATIO (ref 0–1.2)

## 2020-02-05 RX ORDER — POTASSIUM CITRATE 10 MEQ/1
10 TABLET, EXTENDED RELEASE ORAL 2 TIMES DAILY WITH MEALS
Qty: 60 TABLET | Refills: 5 | Status: SHIPPED | OUTPATIENT
Start: 2020-02-05 | End: 2021-01-19 | Stop reason: ALTCHOICE

## 2020-02-05 NOTE — TELEPHONE ENCOUNTER
Patient called because she received a call from her pharmacy about a prescription but she did not know the results yet from her 24 hour urine  I told her I saw that you had called her  She did not get the message  Please call her at [phone]

## 2020-02-05 NOTE — PROGRESS NOTES
24 hour urine stone collection was under collected as urinary creatinine was less than 600 mg  Despite this urinary citrate is very low some going to start potassium citrate 10 mEq twice a day  I am concerned that urinary calcium may be borderline elevated so when I repeat the urine collection after she is on treatment for 2 months will assess whether not she needs medicine for that  Her urinary oxalate was normal     Potassium citrate 10 mEq b i d    Repeat 24 hour urine stone in 2 months  Follow-up in May

## 2020-02-05 NOTE — TELEPHONE ENCOUNTER
----- Message from Sajan Weaver MD sent at 2/5/2020 11:31 AM EST -----  When you have chance call the patient let her know I received her urine collection  You can tell her it revealed she has low citrate the urine which can increased risk of stone formation  Also her urinary calcium was top normal   At this point I think we should prescribed potassium citrate 10 mEq twice a day and I will put the prescription in to her pharmacy for her you can let her know that  If there is any problem with the medication getting it to call let us know  Then let her know your going to mail her another slip for a 24 hour urine stone to do in about 2 months on the treatment  Tell her to make sure she tries to do the full 24 hour urine collection to get an accurate result and then you can put her on the call back list for May follow-up  I will put in the prescription to her pharmacy you can tell her and I also ordered a 24 hour urine the even print send her      Please let me know when you got in touch and that she was okay with this   ----- Message -----  From: [facility] Results In  Sent: 2/5/2020  11:14 AM EST  To: Sajan Weaver MD

## 2020-02-05 NOTE — TELEPHONE ENCOUNTER
Spoke with the patient and she is aware of her 24 hour stone risk profile results and to start potassium citrate 10 MEQ's BID and to have a repeat 24 hour urine which has been mailed out for her  She states she will call us back to schedule her follow up appointment           Reji Bermudez MA

## 2020-02-05 NOTE — TELEPHONE ENCOUNTER
Left message for the patient to call back in regards to her recent 24 hour stone risk profile results and instructions from Dr Anni Melendez MA

## 2020-02-13 ENCOUNTER — HOSPITAL ENCOUNTER (OUTPATIENT)
Dept: MAMMOGRAPHY | Facility: HOSPITAL | Age: 65
Discharge: HOME | End: 2020-02-13
Attending: OBSTETRICS & GYNECOLOGY
Payer: MEDICARE

## 2020-02-13 ENCOUNTER — HOSPITAL ENCOUNTER (OUTPATIENT)
Dept: ULTRASOUND IMAGING | Facility: HOSPITAL | Age: 65
Discharge: HOME | End: 2020-02-13
Payer: MEDICARE

## 2020-02-13 VITALS — BODY MASS INDEX: 23.9 KG/M2 | WEIGHT: 140 LBS | HEIGHT: 64 IN

## 2020-02-13 DIAGNOSIS — E04.1 THYROID NODULE: ICD-10-CM

## 2020-02-13 DIAGNOSIS — Z12.31 ENCOUNTER FOR SCREENING MAMMOGRAM FOR MALIGNANT NEOPLASM OF BREAST: ICD-10-CM

## 2020-02-13 DIAGNOSIS — Z80.3 FAMILY HX-BREAST MALIGNANCY: ICD-10-CM

## 2020-02-13 DIAGNOSIS — E78.01 FAMILIAL HYPERCHOLESTEROLEMIA: ICD-10-CM

## 2020-02-13 PROCEDURE — 76536 US EXAM OF HEAD AND NECK: CPT

## 2020-02-13 PROCEDURE — 77063 BREAST TOMOSYNTHESIS BI: CPT

## 2020-02-13 PROCEDURE — 77067 SCR MAMMO BI INCL CAD: CPT

## 2020-02-13 RX ORDER — SIMVASTATIN 40 MG
40 TABLET ORAL DAILY
Qty: 90 TABLET | Refills: 2 | Status: SHIPPED | OUTPATIENT
Start: 2020-02-13 | End: 2021-02-03 | Stop reason: SDUPTHER

## 2020-09-01 ENCOUNTER — OFFICE VISIT (OUTPATIENT)
Dept: INTERNAL MEDICINE CLINIC | Facility: CLINIC | Age: 65
End: 2020-09-01
Payer: MEDICARE

## 2020-09-01 VITALS
SYSTOLIC BLOOD PRESSURE: 158 MMHG | RESPIRATION RATE: 16 BRPM | TEMPERATURE: 97.5 F | HEIGHT: 64 IN | WEIGHT: 145.8 LBS | OXYGEN SATURATION: 97 % | HEART RATE: 75 BPM | BODY MASS INDEX: 24.89 KG/M2 | DIASTOLIC BLOOD PRESSURE: 82 MMHG

## 2020-09-01 DIAGNOSIS — E78.49 OTHER HYPERLIPIDEMIA: Primary | ICD-10-CM

## 2020-09-01 DIAGNOSIS — E55.9 VITAMIN D DEFICIENCY: ICD-10-CM

## 2020-09-01 DIAGNOSIS — F41.1 GAD (GENERALIZED ANXIETY DISORDER): ICD-10-CM

## 2020-09-01 DIAGNOSIS — G47.9 SLEEP DISTURBANCE: ICD-10-CM

## 2020-09-01 DIAGNOSIS — N20.0 NEPHROLITHIASIS: ICD-10-CM

## 2020-09-01 DIAGNOSIS — E04.1 THYROID NODULE: ICD-10-CM

## 2020-09-01 PROCEDURE — 99214 OFFICE O/P EST MOD 30 MIN: CPT | Performed by: INTERNAL MEDICINE

## 2020-09-01 RX ORDER — ZOLPIDEM TARTRATE 10 MG/1
10 TABLET ORAL
Qty: 30 TABLET | Refills: 0 | Status: SHIPPED | OUTPATIENT
Start: 2020-09-01 | End: 2021-07-20 | Stop reason: SDUPTHER

## 2020-09-01 RX ORDER — DIAZEPAM 5 MG/1
5 TABLET ORAL DAILY PRN
Qty: 30 TABLET | Refills: 0 | Status: SHIPPED | OUTPATIENT
Start: 2020-09-01 | End: 2021-07-20 | Stop reason: SDUPTHER

## 2020-09-01 NOTE — PROGRESS NOTES
Assessment/Plan:    Thyroid nodule  Repeat US in 2022    Nephrolithiasis  She did not tolerate potassium citrate  I asked that she f/u with nephrology  CT due at end of year  It is with contrast and she says they have a hard time finding access  She will f/u with urology    PROSPER (generalized anxiety disorder)  Again, strongly encouraged to start Buspar    Other hyperlipidemia  Continue simvastatin    BMI Counseling: Body mass index is 25 42 kg/m²  The BMI is above normal  Nutrition recommendations include 3-5 servings of fruits/vegetables daily  Exercise recommendations include exercising 3-5 times per week  Problem List Items Addressed This Visit        Endocrine    Thyroid nodule     Repeat US in 2022         Relevant Orders    US thyroid       Genitourinary    Nephrolithiasis     She did not tolerate potassium citrate  I asked that she f/u with nephrology  CT due at end of year  It is with contrast and she says they have a hard time finding access  She will f/u with urology         Relevant Orders    Comprehensive metabolic panel    CBC and Platelet    Comprehensive metabolic panel       Other    Sleep disturbance    Relevant Medications    zolpidem (AMBIEN) 10 mg tablet    PROSPER (generalized anxiety disorder)     Again, strongly encouraged to start Buspar         Relevant Medications    zolpidem (AMBIEN) 10 mg tablet    diazepam (VALIUM) 5 mg tablet    Other hyperlipidemia - Primary     Continue simvastatin           Relevant Orders    CBC and Platelet    Comprehensive metabolic panel    Lipid Panel with Direct LDL reflex    Comprehensive metabolic panel    Lipid Panel with Direct LDL reflex      Other Visit Diagnoses     Vitamin D deficiency        Relevant Orders    Vitamin D 25 hydroxy            Subjective:      Patient ID: Relda Corey is a 75 y o  female      HPI  Here for a follow up  Renal stones started potassium citrate but she did not tolerate the pills  She is supposed to get CT at the end of the year to f/u on the stones and cysts but reports that IV access is difficult  She denies difficulty urinating, hematuria  Anxiety and takes Valium infrequently  She also has Ambien which she takes infrequently  She is reluctant to start Buspar  High cholesterol and takes simvastatin  She has gained weight over the past 6months because of poor diet    The following portions of the patient's history were reviewed and updated as appropriate: allergies, current medications, past family history, past medical history, past social history, past surgical history and problem list     Review of Systems   Constitutional: Positive for fatigue and unexpected weight change (weight gain)  Negative for chills and fever  HENT: Negative for congestion, rhinorrhea and sore throat  Respiratory: Negative for cough and shortness of breath  Cardiovascular: Negative for chest pain and palpitations  Gastrointestinal: Positive for anal bleeding (recently from hemorrhoids)  Negative for abdominal pain, blood in stool, constipation and diarrhea  Genitourinary: Negative for difficulty urinating, dysuria, hematuria and vaginal bleeding  Musculoskeletal: Negative for arthralgias  Neurological: Negative for dizziness and headaches  Psychiatric/Behavioral: Positive for sleep disturbance  Negative for dysphoric mood  The patient is nervous/anxious  Objective:      /82   Pulse 75   Temp 97 5 °F (36 4 °C) (Temporal)   Resp 16   Ht 5' 3 5" (1 613 m)   Wt 66 1 kg (145 lb 12 8 oz)   SpO2 97%   BMI 25 42 kg/m²          Physical Exam  Constitutional:       Appearance: She is well-developed  HENT:      Head: Normocephalic and atraumatic  Eyes:      Conjunctiva/sclera: Conjunctivae normal    Neck:      Musculoskeletal: Neck supple  Cardiovascular:      Rate and Rhythm: Normal rate and regular rhythm  Heart sounds: Normal heart sounds  No murmur     Pulmonary:      Effort: Pulmonary effort is normal  No respiratory distress  Breath sounds: Normal breath sounds  No wheezing or rales  Abdominal:      General: There is no distension  Palpations: Abdomen is soft  There is no mass  Tenderness: There is no abdominal tenderness  There is no guarding or rebound  Musculoskeletal: Normal range of motion  Skin:     General: Skin is warm and dry  Neurological:      Mental Status: She is alert and oriented to person, place, and time  Psychiatric:         Mood and Affect: Mood is anxious  Behavior: Behavior normal          Thought Content:  Thought content normal          Judgment: Judgment normal

## 2020-09-15 DIAGNOSIS — N28.1 RENAL CYST, ACQUIRED, LEFT: ICD-10-CM

## 2020-09-15 DIAGNOSIS — N20.0 NEPHROLITHIASIS: Primary | ICD-10-CM

## 2020-10-01 ENCOUNTER — TELEPHONE (OUTPATIENT)
Dept: UROLOGY | Facility: MEDICAL CENTER | Age: 65
End: 2020-10-01

## 2020-10-01 DIAGNOSIS — N39.0 RECURRENT UTI: Primary | ICD-10-CM

## 2020-10-01 RX ORDER — CEPHALEXIN 500 MG/1
500 CAPSULE ORAL EVERY 6 HOURS SCHEDULED
Qty: 20 CAPSULE | Refills: 0 | Status: SHIPPED | OUTPATIENT
Start: 2020-10-01 | End: 2020-10-06

## 2020-10-02 ENCOUNTER — APPOINTMENT (OUTPATIENT)
Dept: LAB | Facility: CLINIC | Age: 65
End: 2020-10-02
Payer: MEDICARE

## 2020-10-02 DIAGNOSIS — N39.0 RECURRENT UTI: ICD-10-CM

## 2020-10-02 LAB
AMORPH PHOS CRY URNS QL MICRO: ABNORMAL /HPF
BACTERIA UR QL AUTO: ABNORMAL /HPF
BILIRUB UR QL STRIP: NEGATIVE
CLARITY UR: ABNORMAL
COLOR UR: YELLOW
GLUCOSE UR STRIP-MCNC: NEGATIVE MG/DL
HGB UR QL STRIP.AUTO: NEGATIVE
KETONES UR STRIP-MCNC: NEGATIVE MG/DL
LEUKOCYTE ESTERASE UR QL STRIP: ABNORMAL
NITRITE UR QL STRIP: POSITIVE
NON-SQ EPI CELLS URNS QL MICRO: ABNORMAL /HPF
PH UR STRIP.AUTO: 8.5 [PH]
PROT UR STRIP-MCNC: ABNORMAL MG/DL
RBC #/AREA URNS AUTO: ABNORMAL /HPF
SP GR UR STRIP.AUTO: 1.02 (ref 1–1.03)
TRI-PHOS CRY URNS QL MICRO: ABNORMAL /HPF
UROBILINOGEN UR QL STRIP.AUTO: 0.2 E.U./DL
WBC #/AREA URNS AUTO: ABNORMAL /HPF

## 2020-10-02 PROCEDURE — 87086 URINE CULTURE/COLONY COUNT: CPT

## 2020-10-02 PROCEDURE — 87186 SC STD MICRODIL/AGAR DIL: CPT

## 2020-10-02 PROCEDURE — 81001 URINALYSIS AUTO W/SCOPE: CPT

## 2020-10-02 PROCEDURE — 87077 CULTURE AEROBIC IDENTIFY: CPT

## 2020-10-04 LAB — BACTERIA UR CULT: ABNORMAL

## 2020-10-05 ENCOUNTER — TELEPHONE (OUTPATIENT)
Dept: UROLOGY | Facility: CLINIC | Age: 65
End: 2020-10-05

## 2020-10-20 ENCOUNTER — ANNUAL EXAM (OUTPATIENT)
Dept: GYNECOLOGY | Facility: CLINIC | Age: 65
End: 2020-10-20
Payer: MEDICARE

## 2020-10-20 VITALS
HEIGHT: 63 IN | HEART RATE: 77 BPM | SYSTOLIC BLOOD PRESSURE: 122 MMHG | WEIGHT: 151.4 LBS | BODY MASS INDEX: 26.82 KG/M2 | DIASTOLIC BLOOD PRESSURE: 90 MMHG

## 2020-10-20 DIAGNOSIS — N95.2 ATROPHIC VAGINITIS: ICD-10-CM

## 2020-10-20 DIAGNOSIS — Z13.820 ENCOUNTER FOR SCREENING FOR OSTEOPOROSIS: ICD-10-CM

## 2020-10-20 DIAGNOSIS — B37.9 CANDIDIASIS: Primary | ICD-10-CM

## 2020-10-20 DIAGNOSIS — Z78.0 MENOPAUSE: ICD-10-CM

## 2020-10-20 DIAGNOSIS — M85.80 OSTEOPENIA, UNSPECIFIED LOCATION: ICD-10-CM

## 2020-10-20 DIAGNOSIS — Z12.31 ENCOUNTER FOR SCREENING MAMMOGRAM FOR MALIGNANT NEOPLASM OF BREAST: ICD-10-CM

## 2020-10-20 PROCEDURE — G0101 CA SCREEN;PELVIC/BREAST EXAM: HCPCS | Performed by: OBSTETRICS & GYNECOLOGY

## 2020-10-20 RX ORDER — ESTRADIOL 0.1 MG/G
1 CREAM VAGINAL 2 TIMES WEEKLY
Qty: 42.5 G | Refills: 3 | Status: SHIPPED | OUTPATIENT
Start: 2020-10-22 | End: 2022-08-08

## 2020-11-05 DIAGNOSIS — F41.9 ANXIETY: ICD-10-CM

## 2020-11-05 RX ORDER — BUSPIRONE HYDROCHLORIDE 5 MG/1
5 TABLET ORAL 2 TIMES DAILY
Qty: 60 TABLET | Refills: 1 | Status: SHIPPED | OUTPATIENT
Start: 2020-11-05 | End: 2021-04-02

## 2020-12-07 ENCOUNTER — HOSPITAL ENCOUNTER (OUTPATIENT)
Dept: ULTRASOUND IMAGING | Facility: HOSPITAL | Age: 65
Discharge: HOME | End: 2020-12-07
Payer: MEDICARE

## 2020-12-07 DIAGNOSIS — N20.0 NEPHROLITHIASIS: ICD-10-CM

## 2020-12-07 DIAGNOSIS — N28.1 RENAL CYST, ACQUIRED, LEFT: ICD-10-CM

## 2020-12-07 PROCEDURE — 76770 US EXAM ABDO BACK WALL COMP: CPT

## 2020-12-15 ENCOUNTER — OFFICE VISIT (OUTPATIENT)
Dept: UROLOGY | Facility: CLINIC | Age: 65
End: 2020-12-15
Payer: MEDICARE

## 2020-12-15 VITALS
BODY MASS INDEX: 26.4 KG/M2 | SYSTOLIC BLOOD PRESSURE: 132 MMHG | WEIGHT: 149 LBS | HEIGHT: 63 IN | HEART RATE: 86 BPM | DIASTOLIC BLOOD PRESSURE: 82 MMHG

## 2020-12-15 DIAGNOSIS — N28.1 RENAL CYST, ACQUIRED, LEFT: ICD-10-CM

## 2020-12-15 DIAGNOSIS — N20.0 NEPHROLITHIASIS: Primary | ICD-10-CM

## 2020-12-15 PROCEDURE — 99214 OFFICE O/P EST MOD 30 MIN: CPT | Performed by: UROLOGY

## 2021-01-13 ENCOUNTER — LAB (OUTPATIENT)
Dept: LAB | Facility: CLINIC | Age: 66
End: 2021-01-13
Payer: MEDICARE

## 2021-01-13 LAB
25(OH)D3 SERPL-MCNC: 24.8 NG/ML (ref 30–100)
ALBUMIN SERPL BCP-MCNC: 4.5 G/DL (ref 3.5–5)
ALP SERPL-CCNC: 82 U/L (ref 46–116)
ALT SERPL W P-5'-P-CCNC: 32 U/L (ref 12–78)
ANION GAP SERPL CALCULATED.3IONS-SCNC: 4 MMOL/L (ref 4–13)
AST SERPL W P-5'-P-CCNC: 20 U/L (ref 5–45)
BASOPHILS # BLD AUTO: 0.05 THOUSANDS/ΜL (ref 0–0.1)
BASOPHILS NFR BLD AUTO: 1 % (ref 0–1)
BILIRUB SERPL-MCNC: 0.64 MG/DL (ref 0.2–1)
BUN SERPL-MCNC: 26 MG/DL (ref 5–25)
CALCIUM SERPL-MCNC: 9.3 MG/DL (ref 8.3–10.1)
CHLORIDE SERPL-SCNC: 107 MMOL/L (ref 100–108)
CHOLEST SERPL-MCNC: 215 MG/DL (ref 50–200)
CO2 SERPL-SCNC: 29 MMOL/L (ref 21–32)
CREAT SERPL-MCNC: 0.88 MG/DL (ref 0.6–1.3)
EOSINOPHIL # BLD AUTO: 0.28 THOUSAND/ΜL (ref 0–0.61)
EOSINOPHIL NFR BLD AUTO: 3 % (ref 0–6)
ERYTHROCYTE [DISTWIDTH] IN BLOOD BY AUTOMATED COUNT: 13.6 % (ref 11.6–15.1)
GFR SERPL CREATININE-BSD FRML MDRD: 68 ML/MIN/1.73SQ M
GLUCOSE P FAST SERPL-MCNC: 92 MG/DL (ref 65–99)
HCT VFR BLD AUTO: 45.9 % (ref 34.8–46.1)
HDLC SERPL-MCNC: 75 MG/DL
HGB BLD-MCNC: 14.2 G/DL (ref 11.5–15.4)
IMM GRANULOCYTES # BLD AUTO: 0.04 THOUSAND/UL (ref 0–0.2)
IMM GRANULOCYTES NFR BLD AUTO: 0 % (ref 0–2)
LDLC SERPL CALC-MCNC: 113 MG/DL (ref 0–100)
LYMPHOCYTES # BLD AUTO: 3.58 THOUSANDS/ΜL (ref 0.6–4.47)
LYMPHOCYTES NFR BLD AUTO: 37 % (ref 14–44)
MCH RBC QN AUTO: 28.9 PG (ref 26.8–34.3)
MCHC RBC AUTO-ENTMCNC: 30.9 G/DL (ref 31.4–37.4)
MCV RBC AUTO: 93 FL (ref 82–98)
MONOCYTES # BLD AUTO: 0.68 THOUSAND/ΜL (ref 0.17–1.22)
MONOCYTES NFR BLD AUTO: 7 % (ref 4–12)
NEUTROPHILS # BLD AUTO: 5 THOUSANDS/ΜL (ref 1.85–7.62)
NEUTS SEG NFR BLD AUTO: 52 % (ref 43–75)
NRBC BLD AUTO-RTO: 0 /100 WBCS
PLATELET # BLD AUTO: 340 THOUSANDS/UL (ref 149–390)
PMV BLD AUTO: 10.5 FL (ref 8.9–12.7)
POTASSIUM SERPL-SCNC: 3.9 MMOL/L (ref 3.5–5.3)
PROT SERPL-MCNC: 7.9 G/DL (ref 6.4–8.2)
RBC # BLD AUTO: 4.92 MILLION/UL (ref 3.81–5.12)
SODIUM SERPL-SCNC: 140 MMOL/L (ref 136–145)
TRIGL SERPL-MCNC: 137 MG/DL
WBC # BLD AUTO: 9.63 THOUSAND/UL (ref 4.31–10.16)

## 2021-01-13 PROCEDURE — 80061 LIPID PANEL: CPT | Performed by: INTERNAL MEDICINE

## 2021-01-13 PROCEDURE — 82306 VITAMIN D 25 HYDROXY: CPT | Performed by: INTERNAL MEDICINE

## 2021-01-13 PROCEDURE — 80053 COMPREHEN METABOLIC PANEL: CPT | Performed by: INTERNAL MEDICINE

## 2021-01-13 PROCEDURE — 85025 COMPLETE CBC W/AUTO DIFF WBC: CPT | Performed by: INTERNAL MEDICINE

## 2021-01-13 PROCEDURE — 36415 COLL VENOUS BLD VENIPUNCTURE: CPT | Performed by: INTERNAL MEDICINE

## 2021-01-19 ENCOUNTER — OFFICE VISIT (OUTPATIENT)
Dept: INTERNAL MEDICINE CLINIC | Facility: CLINIC | Age: 66
End: 2021-01-19
Payer: MEDICARE

## 2021-01-19 VITALS
BODY MASS INDEX: 26.36 KG/M2 | WEIGHT: 148.8 LBS | SYSTOLIC BLOOD PRESSURE: 124 MMHG | DIASTOLIC BLOOD PRESSURE: 78 MMHG | TEMPERATURE: 97.9 F | HEIGHT: 63 IN | OXYGEN SATURATION: 98 % | HEART RATE: 76 BPM

## 2021-01-19 DIAGNOSIS — E55.9 VITAMIN D DEFICIENCY: ICD-10-CM

## 2021-01-19 DIAGNOSIS — E04.1 THYROID NODULE: ICD-10-CM

## 2021-01-19 DIAGNOSIS — R03.0 ELEVATED BLOOD-PRESSURE READING WITHOUT DIAGNOSIS OF HYPERTENSION: Primary | ICD-10-CM

## 2021-01-19 DIAGNOSIS — E78.2 MIXED HYPERLIPIDEMIA: ICD-10-CM

## 2021-01-19 DIAGNOSIS — N20.0 NEPHROLITHIASIS: ICD-10-CM

## 2021-01-19 DIAGNOSIS — F41.1 GAD (GENERALIZED ANXIETY DISORDER): ICD-10-CM

## 2021-01-19 DIAGNOSIS — Z00.00 MEDICARE ANNUAL WELLNESS VISIT, SUBSEQUENT: ICD-10-CM

## 2021-01-19 PROBLEM — Z13.6 SCREENING FOR CARDIOVASCULAR CONDITION: Status: RESOLVED | Noted: 2018-05-03 | Resolved: 2021-01-19

## 2021-01-19 PROBLEM — Z13.6 SCREENING FOR AAA (ABDOMINAL AORTIC ANEURYSM): Status: RESOLVED | Noted: 2018-05-03 | Resolved: 2021-01-19

## 2021-01-19 PROCEDURE — 99214 OFFICE O/P EST MOD 30 MIN: CPT | Performed by: INTERNAL MEDICINE

## 2021-01-19 PROCEDURE — 1123F ACP DISCUSS/DSCN MKR DOCD: CPT | Performed by: INTERNAL MEDICINE

## 2021-01-19 PROCEDURE — G0438 PPPS, INITIAL VISIT: HCPCS | Performed by: INTERNAL MEDICINE

## 2021-01-19 NOTE — PROGRESS NOTES
Assessment/Plan:  Discussed proper way of taking BP  Her left arm is too low when she takes  Continue checking BID and call with readings  If >140/90, will treat  Thyroid nodule  US due next year    Nephrolithiasis  Stable on recent KUB US    PROSPER (generalized anxiety disorder)  Increase Buspar to BID    Mixed hyperlipidemia  Continue regular use of simvastatin         Problem List Items Addressed This Visit        Endocrine    Thyroid nodule     US due next year            Genitourinary    Nephrolithiasis     Stable on recent KUB US            Other    PROSPER (generalized anxiety disorder)     Increase Buspar to BID         Mixed hyperlipidemia     Continue regular use of simvastatin         Relevant Orders    Lipid Panel with Direct LDL reflex      Other Visit Diagnoses     Elevated blood-pressure reading without diagnosis of hypertension    -  Primary    Relevant Orders    CBC and Platelet    Basic metabolic panel    Vitamin D deficiency        Relevant Orders    Vitamin D 25 hydroxy    Medicare annual wellness visit, subsequent                Subjective:      Patient ID: Kiah Clay is a 77 y o  female  HPI  Recent labs reviewed- D is slightly low on MVI and D 1000 units a day   from 141 on simvastatin 40mg which she takes more regularly  now  She also started exercising a month ago  BP readings 130-160/80s and in the evening 120/80s  She decided to start Buspar for the anxiety which has helped and she is tolerating well  She plans to raise it to BID    The following portions of the patient's history were reviewed and updated as appropriate: allergies, current medications, past family history, past medical history, past social history, past surgical history and problem list     Review of Systems   Constitutional: Positive for unexpected weight change (weight gain)  Negative for fatigue and fever  HENT: Negative for congestion, rhinorrhea and sore throat      Respiratory: Negative for cough, shortness of breath and wheezing  Cardiovascular: Negative for chest pain, palpitations and leg swelling  Gastrointestinal: Negative for abdominal pain, constipation, diarrhea, nausea and vomiting  Genitourinary: Negative for difficulty urinating and dysuria  Musculoskeletal: Negative for arthralgias and myalgias  Neurological: Negative for dizziness and headaches  Psychiatric/Behavioral: The patient is nervous/anxious  Objective:      /78   Pulse 76   Temp 97 9 °F (36 6 °C)   Ht 5' 3" (1 6 m)   Wt 67 5 kg (148 lb 12 8 oz)   SpO2 98%   BMI 26 36 kg/m²          Physical Exam  Constitutional:       General: She is not in acute distress  Appearance: She is well-developed  She is not ill-appearing, toxic-appearing or diaphoretic  HENT:      Head: Normocephalic and atraumatic  Right Ear: External ear normal  There is no impacted cerumen  Left Ear: External ear normal  There is no impacted cerumen  Eyes:      Conjunctiva/sclera: Conjunctivae normal    Neck:      Musculoskeletal: Neck supple  Cardiovascular:      Rate and Rhythm: Normal rate and regular rhythm  Heart sounds: Normal heart sounds  No murmur  Pulmonary:      Effort: Pulmonary effort is normal  No respiratory distress  Breath sounds: Normal breath sounds  No wheezing or rales  Abdominal:      General: There is no distension  Palpations: Abdomen is soft  There is no mass  Tenderness: There is no abdominal tenderness  There is no guarding or rebound  Musculoskeletal: Normal range of motion  Skin:     General: Skin is warm and dry  Neurological:      Mental Status: She is alert and oriented to person, place, and time  Psychiatric:         Mood and Affect: Mood is anxious  Behavior: Behavior normal          Thought Content:  Thought content normal          Judgment: Judgment normal

## 2021-01-19 NOTE — PROGRESS NOTES
Assessment and Plan:     Problem List Items Addressed This Visit        Endocrine    Thyroid nodule     US due next year            Genitourinary    Nephrolithiasis     Stable on recent KUB US            Other    PROSPER (generalized anxiety disorder)     Increase Buspar to BID         Mixed hyperlipidemia     Continue regular use of simvastatin         Relevant Orders    Lipid Panel with Direct LDL reflex      Other Visit Diagnoses     Elevated blood-pressure reading without diagnosis of hypertension    -  Primary    Relevant Orders    CBC and Platelet    Basic metabolic panel    Vitamin D deficiency        Relevant Orders    Vitamin D 25 hydroxy    Medicare annual wellness visit, subsequent            BMI Counseling: Body mass index is 26 36 kg/m²  The BMI is above normal  Nutrition recommendations include consuming healthier snacks, moderation in carbohydrate intake and reducing intake of cholesterol  Preventive health issues were discussed with patient, and age appropriate screening tests were ordered as noted in patient's After Visit Summary  Personalized health advice and appropriate referrals for health education or preventive services given if needed, as noted in patient's After Visit Summary  History of Present Illness:     Patient presents for Welcome to Medicare visit       Patient Care Team:  Ace aCno MD as PCP - General (Internal Medicine)  MD Poonam Mcgrath MD (Otolaryngology)     Review of Systems:     Review of Systems   Problem List:     Patient Active Problem List   Diagnosis    Sleep disturbance    PROSPER (generalized anxiety disorder)    Atrophic vaginitis    Chronic right-sided low back pain without sciatica    Eustachian tube disorder, bilateral    Insomnia, controlled    MVP (mitral valve prolapse)    Nephrolithiasis    Osteopenia    Mixed hyperlipidemia    Thyroid nodule    Recurrent UTI    Renal cyst, acquired, left    Glaucoma suspect of both eyes Past Medical and Surgical History:     Past Medical History:   Diagnosis Date    Allergic     Anxiety     Chronic kidney disease     Hyperlipidemia     Recurrent UTI     Screening for AAA (abdominal aortic aneurysm) 5/3/2018    fam hx aaa- check screen- last one done 2016- ectatic at 2 7  check labs     Screening for cardiovascular condition 5/3/2018    Tonsillitis      Past Surgical History:   Procedure Laterality Date    CHOLECYSTECTOMY      CHOLECYSTECTOMY OPEN      COLONOSCOPY  2014    HYSTERECTOMY N/A     uterus removed age 25   [address]     OOPHORECTOMY      SALIVARY GLAND SURGERY Right     excision of parotid tumor/gland    SALPINGOOPHORECTOMY Bilateral     age 48   [facility] TONSILLECTOMY        Family History:     Family History   Problem Relation Age of Onset    Kidney cancer Mother     Skin cancer Mother     Kidney nephrosis Mother     Aneurysm Father         abdominal aortic aneurysm    Nephrolithiasis Father     Kidney nephrosis Father     Breast cancer Sister 47    Breast cancer Maternal Grandmother 72        approximate age   [facility] Breast cancer Maternal Aunt 76    Stomach cancer Paternal Uncle     Hypertension Maternal Grandfather     Diabetes Maternal Grandfather     Sudden death Maternal Grandfather         cardiac    Polycystic ovary syndrome Daughter     Diabetes Paternal Grandfather     Coronary artery disease Neg Hx     Stroke Neg Hx     Arthritis Neg Hx     Hyperlipidemia Neg Hx       Social History:     E-Cigarette/Vaping    E-Cigarette Use Never User      E-Cigarette/Vaping Substances    Nicotine No     THC No     CBD No     Flavoring No     Other No     Unknown No      Social History     Socioeconomic History    Marital status: /Civil Union     Spouse name: None    Number of children: 3    Years of education: None    Highest education level: None   Occupational History    None   Social Needs    Financial resource strain: None    Food insecurity     Worry: None     Inability: None    Transportation needs     Medical: None     Non-medical: None   Tobacco Use    Smoking status: Never Smoker    Smokeless tobacco: Never Used   Substance and Sexual Activity    Alcohol use: No    Drug use: No    Sexual activity: Yes     Birth control/protection: Post-menopausal   Lifestyle    Physical activity     Days per week: None     Minutes per session: None    Stress: None   Relationships    Social connections     Talks on phone: None     Gets together: None     Attends [employer] witness service: None     Active member of club or organization: None     Attends meetings of clubs or organizations: None     Relationship status: None    Intimate partner violence     Fear of current or ex partner: None     Emotionally abused: None     Physically abused: None     Forced sexual activity: None   Other Topics Concern    None   Social History Narrative    None      Medications and Allergies:     Current Outpatient Medications   Medication Sig Dispense Refill    busPIRone (BUSPAR) 5 mg tablet Take 1 tablet (5 mg total) by mouth 2 (two) times a day 60 tablet 1    cholecalciferol (VITAMIN D3) 1,000 units tablet Take 2,000 Units by mouth daily      diazepam (VALIUM) 5 mg tablet Take 1 tablet (5 mg total) by mouth daily as needed for anxiety No driving with this  No alcohol with this   Do not combine with zolpidem 30 tablet 0    estradiol (ESTRACE VAGINAL) 0 1 mg/g vaginal cream Insert 1 g into the vagina 2 (two) times a week 42 5 g 3    Multiple Vitamins-Minerals (CENTRUM SILVER ADULT 50+) TABS Take 1 tablet by mouth daily        Probiotic Product (PROBIOTIC COLON SUPPORT) CAPS Take 1 capsule by mouth daily        simvastatin (ZOCOR) 40 mg tablet TAKE 1 TABLET (40 MG TOTAL) BY MOUTH DAILY 90 tablet 2    zolpidem (AMBIEN) 10 mg tablet Take 1 tablet (10 mg total) by mouth daily at bedtime as needed for sleep 30 tablet 0    Vaginal Lubricant (REPLENS) GEL Insert into the vagina       No current facility-administered medications for this visit  Allergies   Allergen Reactions    Augmentin [Amoxicillin-Pot Clavulanate] GI Intolerance    Macrobid [Nitrofurantoin] GI Intolerance      Immunizations: There is no immunization history for the selected administration types on file for this patient  Health Maintenance:         Topic Date Due    MAMMOGRAM  02/13/2022    Colorectal Cancer Screening  08/27/2025    Hepatitis C Screening  Completed         Topic Date Due    DTaP,Tdap,and Td Vaccines (1 - Tdap) 10/10/1974    Pneumococcal Vaccine: 65+ Years (1 of 1 - PPSV23) 10/10/2018    Influenza Vaccine (1) 09/01/2020      Medicare Screening Tests and Risk Assessments:     May Soria is here for her Subsequent Wellness visit  Health Risk Assessment:   Patient rates overall health as very good  Patient feels that their physical health rating is same  Eyesight was rated as same  Hearing was rated as same  Patient feels that their emotional and mental health rating is same  Pain experienced in the last 7 days has been none  Patient states that she has experienced weight loss or gain in last 6 months  Depression Screening:   PHQ-2 Score: 0      Fall Risk Screening: In the past year, patient has experienced: no history of falling in past year      Urinary Incontinence Screening:   Patient has not leaked urine accidently in the last six months  Home Safety:  Patient does not have trouble with stairs inside or outside of their home  Patient has working smoke alarms Home safety hazards include: none  Nutrition:   Current diet is Regular, Frequent junk food and No Added Salt  Medications:   Patient is currently taking over-the-counter supplements  OTC medications include: see medication list  Patient is able to manage medications       Activities of Daily Living (ADLs)/Instrumental Activities of Daily Living (IADLs):   Walk and transfer into and out of bed and chair?: Yes  Dress and groom yourself?: Yes    Bathe or shower yourself?: Yes    Feed yourself? Yes  Do your laundry/housekeeping?: Yes  Manage your money, pay your bills and track your expenses?: Yes  Make your own meals?: Yes    Do your own shopping?: Yes    Durable Medical Equipment Suppliers  none    Previous Hospitalizations:   Any hospitalizations or ED visits within the last 12 months?: No      Advance Care Planning:   Living will: No    Durable POA for healthcare:  Yes    Advanced directive: Yes      Cognitive Screening:   Provider or family/friend/caregiver concerned regarding cognition?: No    PREVENTIVE SCREENINGS      Cardiovascular Screening:    General: Screening Not Indicated and History Lipid Disorder      Diabetes Screening:     General: Screening Current      Colorectal Cancer Screening:     General: Screening Current      Breast Cancer Screening:     General: Screening Current      Cervical Cancer Screening:    General: Screening Not Indicated      Abdominal Aortic Aneurysm (AAA) Screening:        General: Screening Current      Lung Cancer Screening:     General: Screening Not Indicated      Hepatitis C Screening:    General: Screening Current    No exam data present     Physical Exam:     /78   Pulse 76   Temp 97 9 °F (36 6 °C)   Ht 5' 3" (1 6 m)   Wt 67 5 kg (148 lb 12 8 oz)   SpO2 98%   BMI 26 36 kg/m²     Physical Exam     Monica Mark MD

## 2021-02-03 DIAGNOSIS — E78.01 FAMILIAL HYPERCHOLESTEROLEMIA: ICD-10-CM

## 2021-02-03 DIAGNOSIS — Z00.00 LABORATORY EXAM ORDERED AS PART OF ROUTINE GENERAL MEDICAL EXAMINATION: Primary | ICD-10-CM

## 2021-02-03 RX ORDER — SIMVASTATIN 40 MG
40 TABLET ORAL DAILY
Qty: 90 TABLET | Refills: 2 | Status: SHIPPED | OUTPATIENT
Start: 2021-02-03 | End: 2021-12-10

## 2021-02-13 DIAGNOSIS — Z23 ENCOUNTER FOR IMMUNIZATION: ICD-10-CM

## 2021-02-15 ENCOUNTER — IMMUNIZATIONS (OUTPATIENT)
Dept: FAMILY MEDICINE CLINIC | Facility: HOSPITAL | Age: 66
End: 2021-02-15

## 2021-02-15 DIAGNOSIS — Z23 ENCOUNTER FOR IMMUNIZATION: Primary | ICD-10-CM

## 2021-02-15 PROCEDURE — 91300 SARS-COV-2 / COVID-19 MRNA VACCINE (PFIZER-BIONTECH) 30 MCG: CPT

## 2021-02-15 PROCEDURE — 0001A SARS-COV-2 / COVID-19 MRNA VACCINE (PFIZER-BIONTECH) 30 MCG: CPT

## 2021-03-07 ENCOUNTER — IMMUNIZATIONS (OUTPATIENT)
Dept: FAMILY MEDICINE CLINIC | Facility: HOSPITAL | Age: 66
End: 2021-03-07

## 2021-03-07 DIAGNOSIS — Z23 ENCOUNTER FOR IMMUNIZATION: Primary | ICD-10-CM

## 2021-03-07 PROCEDURE — 0002A SARS-COV-2 / COVID-19 MRNA VACCINE (PFIZER-BIONTECH) 30 MCG: CPT

## 2021-03-07 PROCEDURE — 91300 SARS-COV-2 / COVID-19 MRNA VACCINE (PFIZER-BIONTECH) 30 MCG: CPT

## 2021-04-01 DIAGNOSIS — F41.9 ANXIETY: ICD-10-CM

## 2021-04-02 RX ORDER — BUSPIRONE HYDROCHLORIDE 5 MG/1
TABLET ORAL
Qty: 60 TABLET | Refills: 5 | Status: SHIPPED | OUTPATIENT
Start: 2021-04-02 | End: 2022-01-24

## 2021-05-21 NOTE — ASSESSMENT & PLAN NOTE
She did not tolerate potassium citrate  I asked that she f/u with nephrology  CT due at end of year  It is with contrast and she says they have a hard time finding access   She will f/u with urology Topical Retinoid counseling:  Patient advised to apply a pea-sized amount only at bedtime and wait 30 minutes after washing their face before applying.  If too drying, patient may add a non-comedogenic moisturizer. The patient verbalized understanding of the proper use and possible adverse effects of retinoids.  All of the patient's questions and concerns were addressed.

## 2021-07-13 ENCOUNTER — APPOINTMENT (OUTPATIENT)
Dept: LAB | Facility: CLINIC | Age: 66
End: 2021-07-13
Payer: MEDICARE

## 2021-07-13 DIAGNOSIS — Z00.00 LABORATORY EXAM ORDERED AS PART OF ROUTINE GENERAL MEDICAL EXAMINATION: ICD-10-CM

## 2021-07-13 DIAGNOSIS — E55.9 VITAMIN D DEFICIENCY: ICD-10-CM

## 2021-07-13 DIAGNOSIS — E78.2 MIXED HYPERLIPIDEMIA: ICD-10-CM

## 2021-07-13 DIAGNOSIS — R03.0 ELEVATED BLOOD-PRESSURE READING WITHOUT DIAGNOSIS OF HYPERTENSION: ICD-10-CM

## 2021-07-13 LAB
25(OH)D3 SERPL-MCNC: 33.6 NG/ML (ref 30–100)
ABO GROUP BLD: NORMAL
ANION GAP SERPL CALCULATED.3IONS-SCNC: 6 MMOL/L (ref 4–13)
BLD GP AB SCN SERPL QL: NEGATIVE
BUN SERPL-MCNC: 20 MG/DL (ref 5–25)
CALCIUM SERPL-MCNC: 9.4 MG/DL (ref 8.3–10.1)
CHLORIDE SERPL-SCNC: 105 MMOL/L (ref 100–108)
CHOLEST SERPL-MCNC: 189 MG/DL (ref 50–200)
CO2 SERPL-SCNC: 27 MMOL/L (ref 21–32)
CREAT SERPL-MCNC: 0.81 MG/DL (ref 0.6–1.3)
ERYTHROCYTE [DISTWIDTH] IN BLOOD BY AUTOMATED COUNT: 13.6 % (ref 11.6–15.1)
GFR SERPL CREATININE-BSD FRML MDRD: 75 ML/MIN/1.73SQ M
GLUCOSE P FAST SERPL-MCNC: 95 MG/DL (ref 65–99)
HCT VFR BLD AUTO: 40.8 % (ref 34.8–46.1)
HDLC SERPL-MCNC: 55 MG/DL
HGB BLD-MCNC: 13.3 G/DL (ref 11.5–15.4)
LDLC SERPL CALC-MCNC: 96 MG/DL (ref 0–100)
MCH RBC QN AUTO: 29.8 PG (ref 26.8–34.3)
MCHC RBC AUTO-ENTMCNC: 32.6 G/DL (ref 31.4–37.4)
MCV RBC AUTO: 92 FL (ref 82–98)
PLATELET # BLD AUTO: 302 THOUSANDS/UL (ref 149–390)
PMV BLD AUTO: 10.1 FL (ref 8.9–12.7)
POTASSIUM SERPL-SCNC: 3.8 MMOL/L (ref 3.5–5.3)
RBC # BLD AUTO: 4.46 MILLION/UL (ref 3.81–5.12)
RH BLD: POSITIVE
SODIUM SERPL-SCNC: 138 MMOL/L (ref 136–145)
SPECIMEN EXPIRATION DATE: NORMAL
TRIGL SERPL-MCNC: 191 MG/DL
WBC # BLD AUTO: 9 THOUSAND/UL (ref 4.31–10.16)

## 2021-07-13 PROCEDURE — 86850 RBC ANTIBODY SCREEN: CPT

## 2021-07-13 PROCEDURE — 36415 COLL VENOUS BLD VENIPUNCTURE: CPT

## 2021-07-13 PROCEDURE — 80061 LIPID PANEL: CPT

## 2021-07-13 PROCEDURE — 80048 BASIC METABOLIC PNL TOTAL CA: CPT

## 2021-07-13 PROCEDURE — 86901 BLOOD TYPING SEROLOGIC RH(D): CPT

## 2021-07-13 PROCEDURE — 85027 COMPLETE CBC AUTOMATED: CPT

## 2021-07-13 PROCEDURE — 82306 VITAMIN D 25 HYDROXY: CPT

## 2021-07-13 PROCEDURE — 86900 BLOOD TYPING SEROLOGIC ABO: CPT

## 2021-07-20 ENCOUNTER — OFFICE VISIT (OUTPATIENT)
Dept: INTERNAL MEDICINE CLINIC | Facility: CLINIC | Age: 66
End: 2021-07-20
Payer: MEDICARE

## 2021-07-20 VITALS
HEART RATE: 79 BPM | HEIGHT: 63 IN | WEIGHT: 152.4 LBS | OXYGEN SATURATION: 98 % | BODY MASS INDEX: 27 KG/M2 | RESPIRATION RATE: 16 BRPM | SYSTOLIC BLOOD PRESSURE: 142 MMHG | DIASTOLIC BLOOD PRESSURE: 80 MMHG

## 2021-07-20 DIAGNOSIS — N76.0 ACUTE VAGINITIS: ICD-10-CM

## 2021-07-20 DIAGNOSIS — F41.1 GAD (GENERALIZED ANXIETY DISORDER): Primary | ICD-10-CM

## 2021-07-20 DIAGNOSIS — E04.1 THYROID NODULE: ICD-10-CM

## 2021-07-20 DIAGNOSIS — R03.0 ELEVATED BLOOD PRESSURE READING WITHOUT DIAGNOSIS OF HYPERTENSION: ICD-10-CM

## 2021-07-20 DIAGNOSIS — R05.9 COUGH: ICD-10-CM

## 2021-07-20 DIAGNOSIS — E78.2 MIXED HYPERLIPIDEMIA: ICD-10-CM

## 2021-07-20 DIAGNOSIS — G47.00 INSOMNIA, CONTROLLED: ICD-10-CM

## 2021-07-20 DIAGNOSIS — M54.50 CHRONIC LEFT-SIDED LOW BACK PAIN WITHOUT SCIATICA: ICD-10-CM

## 2021-07-20 DIAGNOSIS — G89.29 CHRONIC LEFT-SIDED LOW BACK PAIN WITHOUT SCIATICA: ICD-10-CM

## 2021-07-20 PROCEDURE — 99214 OFFICE O/P EST MOD 30 MIN: CPT | Performed by: INTERNAL MEDICINE

## 2021-07-20 RX ORDER — DIAZEPAM 5 MG/1
5 TABLET ORAL DAILY PRN
Qty: 30 TABLET | Refills: 0 | Status: SHIPPED | OUTPATIENT
Start: 2021-07-20 | End: 2022-01-24 | Stop reason: SDUPTHER

## 2021-07-20 RX ORDER — FLUCONAZOLE 100 MG/1
100 TABLET ORAL ONCE
Qty: 1 TABLET | Refills: 0 | Status: SHIPPED | OUTPATIENT
Start: 2021-07-20 | End: 2021-07-20

## 2021-07-20 RX ORDER — ZOLPIDEM TARTRATE 10 MG/1
10 TABLET ORAL
Qty: 30 TABLET | Refills: 0 | Status: SHIPPED | OUTPATIENT
Start: 2021-07-20 | End: 2022-01-24 | Stop reason: SDUPTHER

## 2021-07-20 NOTE — PROGRESS NOTES
Assessment/Plan:    PROSPER (generalized anxiety disorder)  Try increasing the buspirone again to 5mg BID    Thyroid nodule  US due in Feb    Check BP with a new machine  Try Claritin for cough  F/U with ortho for back pain       Problem List Items Addressed This Visit        Endocrine    Thyroid nodule     US due in Feb         Relevant Orders    CBC and Platelet    Comprehensive metabolic panel    TSH, 3rd generation with Free T4 reflex       Other    Insomnia, controlled    Relevant Medications    zolpidem (AMBIEN) 10 mg tablet    Mixed hyperlipidemia    Relevant Orders    CBC and Platelet    Comprehensive metabolic panel    Lipid Panel with Direct LDL reflex    PROSPER (generalized anxiety disorder) - Primary     Try increasing the buspirone again to 5mg BID         Relevant Medications    diazepam (VALIUM) 5 mg tablet    zolpidem (AMBIEN) 10 mg tablet      Other Visit Diagnoses     Acute vaginitis        Relevant Medications    fluconazole (DIFLUCAN) 100 mg tablet    Elevated blood pressure reading without diagnosis of hypertension        Her machine is inaccureate so I asked that she get a new one and call to schedule a BP check right after    Cough        Try loratadine for at least a week and call if not im proving  Consider ENT eval    Chronic left-sided low back pain without sciatica        Follow up with orthopedics as scheduled            Subjective:      Patient ID: Maria Elena Hudson is a 77 y o  female  HPI  Recent labs reviewed and TG elevated  She has her BP machine which is giving readings 20 points higher than ours  Anxiety on Buspar 5mg a day  She felt nauseous when she took it BID but did nt take it for very long  Cough for 2 weeks   She has not tried OTC meds  Vaginal itching for a week feels like rpevious yeast infection  Left low back pain to the buttock for 2months and made appt with Dr Janina Matthews    The following portions of the patient's history were reviewed and updated as appropriate: allergies, current medications, past family history, past medical history, past social history, past surgical history and problem list     Review of Systems   Constitutional: Positive for unexpected weight change (weight gain)  Negative for fatigue and fever  HENT: Positive for postnasal drip  Negative for sore throat  Respiratory: Positive for cough  Negative for shortness of breath  Cardiovascular: Negative for chest pain, palpitations and leg swelling  Gastrointestinal: Negative for abdominal pain, constipation, diarrhea, nausea and vomiting  Genitourinary: Negative for difficulty urinating  Vaginal itching and discomfort for a week feels like her previous yeastinfection   Musculoskeletal: Positive for back pain (2months of pain in the left lower back to the buttocks and made appt with orthopedics)  Negative for arthralgias and myalgias  Neurological: Negative for dizziness and headaches  Objective:      /80 (BP Location: Left arm)   Pulse 79   Resp 16   Ht 5' 3" (1 6 m)   Wt 69 1 kg (152 lb 6 4 oz)   SpO2 98%   BMI 27 00 kg/m²          Physical Exam  Constitutional:       General: She is not in acute distress  Appearance: She is well-developed  She is not ill-appearing, toxic-appearing or diaphoretic  HENT:      Head: Normocephalic and atraumatic  Mouth/Throat:      Comments: Cobblestoning of post pharynx  Eyes:      Conjunctiva/sclera: Conjunctivae normal    Cardiovascular:      Rate and Rhythm: Normal rate and regular rhythm  Heart sounds: Normal heart sounds  No murmur heard  Pulmonary:      Effort: Pulmonary effort is normal  No respiratory distress  Breath sounds: Normal breath sounds  No wheezing or rales  Abdominal:      General: There is no distension  Palpations: Abdomen is soft  There is no mass  Tenderness: There is no abdominal tenderness  There is no guarding or rebound     Musculoskeletal:         General: Tenderness (right SI; neg SLR) present  Cervical back: Neck supple  Right lower leg: No edema  Left lower leg: No edema  Skin:     General: Skin is warm and dry  Neurological:      Mental Status: She is alert and oriented to person, place, and time  Psychiatric:         Mood and Affect: Mood normal          Behavior: Behavior normal          Thought Content:  Thought content normal          Judgment: Judgment normal

## 2021-07-28 ENCOUNTER — OFFICE VISIT (OUTPATIENT)
Dept: OBGYN CLINIC | Facility: CLINIC | Age: 66
End: 2021-07-28
Payer: MEDICARE

## 2021-07-28 ENCOUNTER — TELEPHONE (OUTPATIENT)
Dept: OBGYN CLINIC | Facility: HOSPITAL | Age: 66
End: 2021-07-28

## 2021-07-28 ENCOUNTER — APPOINTMENT (OUTPATIENT)
Dept: RADIOLOGY | Facility: AMBULARY SURGERY CENTER | Age: 66
End: 2021-07-28
Attending: ORTHOPAEDIC SURGERY
Payer: MEDICARE

## 2021-07-28 VITALS
BODY MASS INDEX: 26.93 KG/M2 | HEART RATE: 71 BPM | SYSTOLIC BLOOD PRESSURE: 119 MMHG | DIASTOLIC BLOOD PRESSURE: 83 MMHG | WEIGHT: 152 LBS

## 2021-07-28 DIAGNOSIS — M54.50 ACUTE LOW BACK PAIN, UNSPECIFIED BACK PAIN LATERALITY, UNSPECIFIED WHETHER SCIATICA PRESENT: Primary | ICD-10-CM

## 2021-07-28 DIAGNOSIS — M54.50 ACUTE LOW BACK PAIN, UNSPECIFIED BACK PAIN LATERALITY, UNSPECIFIED WHETHER SCIATICA PRESENT: ICD-10-CM

## 2021-07-28 PROCEDURE — 72110 X-RAY EXAM L-2 SPINE 4/>VWS: CPT

## 2021-07-28 PROCEDURE — 99203 OFFICE O/P NEW LOW 30 MIN: CPT | Performed by: ORTHOPAEDIC SURGERY

## 2021-07-28 RX ORDER — FLUCONAZOLE 100 MG/1
TABLET ORAL
COMMUNITY
Start: 2021-07-20 | End: 2022-06-11

## 2021-07-28 RX ORDER — MELOXICAM 15 MG/1
15 TABLET ORAL DAILY
Qty: 30 TABLET | Refills: 0 | Status: SHIPPED | OUTPATIENT
Start: 2021-07-28 | End: 2022-06-11

## 2021-07-28 NOTE — TELEPHONE ENCOUNTER
Patient sees Dr Catalina Yanez  Patient called because she just had an office visit and she called asking, for the prescription of the medication that was recommended by Dr Catalina Yanez? [facility] on file

## 2021-07-28 NOTE — PROGRESS NOTES
Assessment/Plan:    No problem-specific Assessment & Plan notes found for this encounter  Acute left low back pain  · Start Meloxicam 15mg daily  · Start physical therapy  · Follow up in 4 weeks        Problem List Items Addressed This Visit     None      Visit Diagnoses     Acute low back pain, unspecified back pain laterality, unspecified whether sciatica present    -  Primary    Relevant Orders    XR spine lumbar minimum 4 views non injury            Subjective:      Patient ID: Jorge Flores is a 77 y o  female  HPI   The patient presents for initial evaluation of lumbar spine  She was last seen in 2017 for right low back pain  She has had recent increase of symptoms over last 2 months with no injury  Today she complains of left low back and buttock pain  She denies radiating symptoms  Pain is worse in the morning  She rates her pain at 2-3/10 and 6/10 at its worse  Lying down flat and on left side aggravates while sitting and walking alleviates  She has used heat and ice with limited benefit  She did use tylenol and advil with some benefit  She has had right SI joint injection 2017  She denies past surgery of lumbar spine  The following portions of the patient's history were reviewed and updated as appropriate: allergies, current medications, past family history, past medical history, past social history, past surgical history and problem list     Review of Systems   Constitutional: Negative for chills, fever and unexpected weight change  HENT: Negative for hearing loss, nosebleeds and sore throat  Eyes: Negative for pain, redness and visual disturbance  Respiratory: Negative for cough, shortness of breath and wheezing  Cardiovascular: Negative for chest pain, palpitations and leg swelling  Gastrointestinal: Negative for abdominal pain, nausea and vomiting  Genitourinary: Negative for dyspareunia, dysuria and frequency  Skin: Negative for rash and wound     Neurological: Negative for dizziness, numbness and headaches  Psychiatric/Behavioral: Negative for decreased concentration and suicidal ideas  The patient is not nervous/anxious  Objective:      /83 (BP Location: Right arm, Patient Position: Sitting, Cuff Size: Adult)   Pulse 71   Wt 68 9 kg (152 lb)   BMI 26 93 kg/m²          Physical Exam  Constitutional:       Appearance: She is well-developed  HENT:      Head: Normocephalic  Eyes:      Conjunctiva/sclera: Conjunctivae normal    Cardiovascular:      Rate and Rhythm: Normal rate  Pulmonary:      Effort: Pulmonary effort is normal    Musculoskeletal:      Cervical back: Normal range of motion  Skin:     General: Skin is warm and dry  Neurological:      Mental Status: She is alert and oriented to person, place, and time  Patient ambulates without assistance   Tender to palpation: none   Modified straight leg raise negative bilaterally   Strength L2-S1 5/5 bilaterally   Sensation L2-S1 intact bilaterally       Imaging:  Lumbar x-rays:  Mild spondylolisthesis at L4-L5        Scribe Attestation    I,:  Cydney Wilkinson am acting as a scribe while in the presence of the attending physician :       I,:  Mathieu Perez MD personally performed the services described in this documentation    as scribed in my presence :

## 2021-07-29 ENCOUNTER — EVALUATION (OUTPATIENT)
Dept: PHYSICAL THERAPY | Facility: CLINIC | Age: 66
End: 2021-07-29
Payer: MEDICARE

## 2021-07-29 DIAGNOSIS — M54.50 ACUTE LOW BACK PAIN, UNSPECIFIED BACK PAIN LATERALITY, UNSPECIFIED WHETHER SCIATICA PRESENT: ICD-10-CM

## 2021-07-29 PROCEDURE — 97162 PT EVAL MOD COMPLEX 30 MIN: CPT | Performed by: PHYSICAL THERAPIST

## 2021-07-29 PROCEDURE — 97112 NEUROMUSCULAR REEDUCATION: CPT | Performed by: PHYSICAL THERAPIST

## 2021-07-29 NOTE — PROGRESS NOTES
PT Evaluation     Today's date: 2021  Patient name: Jorge Flores  : 1955  MRN: [id number]  Referring provider: Jaret Bundy MD  Dx: No diagnosis found  Assessment  Assessment details: Jorge Flores is a pleasant 77 y o  female who presents with low back pain of >2 month duration  The patient's greatest concerns are the pain she is experiencing, worry over not knowing what's wrong, wanting to avoid painkillers, fear of not being able to keep active and future ill health (and wanting to prevent it)  No further referral appears necessary at this time based upon examination results  Primary movement impairment diagnosis of lumbar segmental hypomobility resulting in pathoanatomical symptoms of localized low back pain without radiating pain and limiting her ability to care for self, exercise or recreation, sleep, stand and walk  Primary Impairments:  1) Segmental hypomobility of the lumbar spine  2) Decreased core muscle recruitment    Etiologic factors include none recalled by the patient  Symptom irritability: moderateUnderstanding of Dx/Px/POC: good   Prognosis: good  Prognosis details: Positive prognostic indicators include positive attitude toward recovery, acuity of symptoms, absence of peripheralization and absence of observed red flags  Negative prognostic indicators include high symptom irritability, minimal changes in pain with movement and multiple concurrent orthopedic problems  Goals  Patient will be independent with home exercise program    Patient will be able to manage symptoms independently       Short Term Goals: to be achieved by 4 weeks  1) Patient to be independent with basic HEP  2) Decrease pain to 4/10 at its worst  3) Increase lumbar spine AROM by 25% in all deficient planes   4) Increase LE strength by 1/2 MMT grade in all deficient planes    Long Term Goals: to be achieved by discharge  1) FOTO equal to or greater than 75  2) Patient to be independent with comprehensive HEP  3) Lumbar spine ROM WNL all planes to improve a/iadls  4) Increase LE strength to 5/5 MMT grade in all planes to improve a/iadls  5) Patient to report no sleep interruption secondary to pain  6) Increase tolerance for physical activities to >30 min  Plan  Patient would benefit from: skilled physical therapy  Planned modality interventions: Modalities PRN  Planned therapy interventions: activity modification, manual therapy, neuromuscular re-education, patient education, therapeutic activities, therapeutic exercise, graded activity, home exercise program, behavior modification and self care  Frequency: 2x week  Duration in weeks: 8  Treatment plan discussed with: patient        Subjective Evaluation    History of Present Illness  Mechanism of injury: History of Current Injury: The patient presents with a >2 month history of low back pain on the left side, which has been bothersome while lying down and sleeping  The patient has a history of right-sided pain which is still ongoing  The patient was seen by orthopedics (Dr Shruthi Andrade), who took XR and performed assessment, prescribed muscle relaxers and referred to PT  The patient reports that she can not identify a starting point or mechanism for her current pain complaint  Surgery date: The patient has a history of injection under flouroscopy into the right side of her lumbopelvic region, notes that she had minimal success  Pain location/Descriptors:  The patient indicates pain in the left side of the hip and low back into the buttock region which she describes as an achey pain, the patient denies radiating pain or numbness/tingling  Aggravating factors: Lying, sleeping (particularly on the left side), prolonged standing/walking (>20 min)  Easing factors: Sitting, muscle relaxers,   24 HR pattern: The patient reports that she is unable to sleep long stretches, but notes that once she is up for the day her pain improves quickly  Imaging: XR  Special Questions: No changes in bowel/bladder control, no abnormal changes in weight  Patient concerns: Not being able to participate in activities such as biking, hiking  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)    Social Support  Steps to enter house: yes  Stairs in house: yes   Lives in: multiple-level home  Lives with: spouse    Employment status: not working    Diagnostic Tests  X-ray: normal  Patient Goals  Patient goals for therapy: decreased pain, return to sport/leisure activities and independence with ADLs/IADLs          Objective     Neurological Testing     Sensation     Lumbar   Left   Intact: light touch    Right   Intact: light touch    Reflexes   Left   Patellar (L4): normal (2+)  Achilles (S1): normal (2+)  Clonus sign: negative    Right   Patellar (L4): normal (2+)  Achilles (S1): normal (2+)  Clonus sign: negative    Active Range of Motion     Lumbar   Flexion:  Restriction level: moderate  Extension:  with pain Restriction level: moderate  Left lateral flexion:  Restriction level: minimal  Right lateral flexion:  [facility]    Joint Play     Hypomobile: L1, L2, L3, L4, L5 and S1     Pain: L3, L4 and L5   Mechanical Assessment    Cervical      Thoracic      Lumbar    Standing flexion: sustained positions   Pain location:no change  Standing extension: sustained positions  Pain intensity: worse  Lying extension: repeated movements  Pain intensity: worse    Strength/Myotome Testing     Lumbar   Left   Normal strength    Right   Normal strength    Tests     Lumbar   Negative SIJ compression, sacroiliac distraction and sacral spring   Left   Positive quadrant  Negative crossed SLR, passive SLR and slump test      Right   Positive quadrant     Negative crossed SLR, passive SLR and slump test      Left Pelvic Girdle/Sacrum   Negative: active SLR test      Right Pelvic Girdle/Sacrum   Negative: active SLR test      Left Hip   Negative JOSE ANTONIO and long sit  Right Hip   Positive JOSE ANTONIO  Negative long sit                Precautions: Osteopenia, anxiety, insomnia      Manuals 7/29/21            Prone P-A glide (gr II-III) SRB            Sidelying opening mob                                       Neuro Re-Ed             Hooklying TA set             Hooklying bridge             Hooklying LTR             Supine SLR                                                    Ther Ex             Active warmup             PBall rollouts 3 ways             SKTC                                                                              Ther Activity                                       Gait Training                                       Modalities                                       Assessment IE            Education Prognosis, HEP, POC

## 2021-08-03 NOTE — TELEPHONE ENCOUNTER
Returned call from patient, seen in [facility],  She has history of recurrent kidney stones and would like to know if it is ok to take Calcium 300 mg which is part of her Centrum multivitamin and would also like to know if it is ok to take Biotin  Advised we will check with advanced practitioner and call her back with recommendation  Can you please advise  Thank you  no

## 2021-08-04 ENCOUNTER — OFFICE VISIT (OUTPATIENT)
Dept: PHYSICAL THERAPY | Facility: CLINIC | Age: 66
End: 2021-08-04
Payer: MEDICARE

## 2021-08-04 DIAGNOSIS — M54.50 ACUTE LOW BACK PAIN, UNSPECIFIED BACK PAIN LATERALITY, UNSPECIFIED WHETHER SCIATICA PRESENT: Primary | ICD-10-CM

## 2021-08-04 PROCEDURE — 97110 THERAPEUTIC EXERCISES: CPT | Performed by: PHYSICAL THERAPIST

## 2021-08-04 PROCEDURE — 97112 NEUROMUSCULAR REEDUCATION: CPT | Performed by: PHYSICAL THERAPIST

## 2021-08-04 NOTE — PROGRESS NOTES
Daily Note     Today's date: 2021  Patient name: Abelino Kimbrough  : 1955  MRN: [id number]  Referring provider: Racheal Cadleron MD  Dx:   Encounter Diagnosis     ICD-10-CM    1  Acute low back pain, unspecified back pain laterality, unspecified whether sciatica present  M54 5        Start Time: 0845  Stop Time: 930  Total time in clinic (min): 45 minutes    Subjective: The patient presents for the first follow-up appointment, reports that symptoms improved and then worsened and that she was compliant some of the time with the initial HEP        Objective: See treatment diary below      Assessment: The patient tolerated manual and active treatment well today  The PT reviewed IHEP and introduced initial manual and ther-ex program during today's treatment  The patient demonstrated good response to today's treatment  Plan: Continue per plan of care        Precautions: Osteopenia, anxiety, insomnia      Manuals 21 8/4           Prone P-A glide (gr II-III) SRB            Sidelying opening mob                                       Neuro Re-Ed             Hooklying TA set  3 sec x25 w/ tactile feedback, HEP           Hooklying bridge  W/ glute set 3x10, HEP           Hooklying LTR  2x10 ea, HEP           Hooklying BKFO  2x10 ea, HEP           Supine SLR                                                    Ther Ex             Active warmup  Recumb bike x6 min           PBall rollouts 3 ways  x15 ea           Hamstring stretch  At steps 10 sec hold x5 ea LE           SKTC                                                                              Ther Activity                                       Gait Training                                       Modalities                                       Assessment IE            Education Prognosis, HEP, POC

## 2021-08-05 ENCOUNTER — OFFICE VISIT (OUTPATIENT)
Dept: PHYSICAL THERAPY | Facility: CLINIC | Age: 66
End: 2021-08-05
Payer: MEDICARE

## 2021-08-05 DIAGNOSIS — M54.50 ACUTE LOW BACK PAIN, UNSPECIFIED BACK PAIN LATERALITY, UNSPECIFIED WHETHER SCIATICA PRESENT: Primary | ICD-10-CM

## 2021-08-05 PROCEDURE — 97112 NEUROMUSCULAR REEDUCATION: CPT | Performed by: PHYSICAL THERAPIST

## 2021-08-05 PROCEDURE — 97140 MANUAL THERAPY 1/> REGIONS: CPT | Performed by: PHYSICAL THERAPIST

## 2021-08-05 NOTE — PROGRESS NOTES
Daily Note     Today's date: 2021  Patient name: Guanaco Singleton  : 1955  MRN: [id number]  Referring provider: Lilli Corona MD  Dx:   Encounter Diagnosis     ICD-10-CM    1  Acute low back pain, unspecified back pain laterality, unspecified whether sciatica present  M54 5        Start Time: 1215  Stop Time: 1300  Total time in clinic (min): 45 minutes    Subjective: No new complaints today  The patient reports worsening in symptoms since previous session  Objective: See treatment diary below      Assessment: The PT reviewed the introductory program during today's treatment  Hooklying hip abd/add isometrics were added to the patient's current program to promote further core and hip muscle recruitment  The patient tolerated manual and active treatment well today  The patient would benefit from further skilled PT services  Plan: Continue per plan of care        Precautions: Osteopenia, anxiety, insomnia      Manuals 21 8 8/          Prone P-A glide (gr II-III) SRB  SRB          Sidelying opening mob             Lumbar PSM STM   SRB                       Neuro Re-Ed             Hooklying TA set  3 sec x25 w/ tactile feedback, HEP 3 sec x25 w/ tactile feedback          Hooklying bridge  W/ glute set 3x10, HEP W/ glute set 3x10          Hooklying LTR  2x10 ea, HEP           Hooklying BKFO  2x10 ea, HEP 2x10 ea          Supine SLR             Repeated motions   Sidebending to left 3x10          Hip ADD iso   W/ TA set x20                       Ther Ex             Active warmup  Recumb bike x6 min NuStep x6 min          PBall rollouts 3 ways  x15 ea           Hamstring stretch  At steps 10 sec hold x5 ea LE           SKTC   5 sec x10                                                                           Ther Activity                                       Gait Training                                       Modalities                                       Assessment IE Education Prognosis, HEP, POC

## 2021-08-13 ENCOUNTER — OFFICE VISIT (OUTPATIENT)
Dept: PHYSICAL THERAPY | Facility: CLINIC | Age: 66
End: 2021-08-13
Payer: MEDICARE

## 2021-08-13 DIAGNOSIS — M54.50 ACUTE LOW BACK PAIN, UNSPECIFIED BACK PAIN LATERALITY, UNSPECIFIED WHETHER SCIATICA PRESENT: Primary | ICD-10-CM

## 2021-08-13 PROCEDURE — 97140 MANUAL THERAPY 1/> REGIONS: CPT | Performed by: PHYSICAL THERAPIST

## 2021-08-13 PROCEDURE — 97112 NEUROMUSCULAR REEDUCATION: CPT | Performed by: PHYSICAL THERAPIST

## 2021-08-13 NOTE — PROGRESS NOTES
Daily Note     Today's date: 2021  Patient name: Derian Mcknight  : 1955  MRN: [id number]  Referring provider: Shruthi Dior MD  Dx:   Encounter Diagnosis     ICD-10-CM    1  Acute low back pain, unspecified back pain laterality, unspecified whether sciatica present  M54 5        Start Time: 0930  Stop Time: 1015  Total time in clinic (min): 45 minutes    Subjective: No new complaints today  The patient reports worsening in symptoms since previous session  Objective: See treatment diary below      Assessment: The PT modified the patient's program as she is feeling significant discomfort with some of her home exercises  Prone glute set and SLR into ext were added to the patient's current program in place of the bridge  The patient tolerated manual and active treatment well today  The patient would benefit from further skilled PT services  Plan: Continue per plan of care        Precautions: Osteopenia, anxiety, insomnia      Manuals 21 8 8         Prone P-A glide (gr II-III) SRB  SRB SRB         Sidelying opening mob             Lumbar PSM STM   SRB SRB                      Neuro Re-Ed             Hooklying TA set  3 sec x25 w/ tactile feedback, HEP 3 sec x25 w/ tactile feedback 3 sec x20 w/ cuing         Hooklying bridge  W/ glute set 3x10, HEP W/ glute set 3x10 HELD (pain)         Hooklying LTR  2x10 ea, HEP  2x10          Hooklying BKFO  2x10 ea, HEP 2x10 ea 2x10 ea         Supine SLR             Repeated motions   Sidebending to left 3x10 Sidebending to left 3x10         Hip ADD iso   W/ TA set x20                       Ther Ex             Active warmup  Recumb bike x6 min NuStep x6 min Recumb bike x6 min         PBall rollouts 3 ways  x15 ea           Hamstring stretch  At steps 10 sec hold x5 ea LE           SKTC   5 sec x10          Prone glute set    3 sec x25, HEP         Prone LSR ext    2x10 ea side HEP                                                Ther Activity                                       Gait Training                                       Modalities                                       Assessment IE            Education Prognosis, HEP, POC

## 2021-08-16 ENCOUNTER — OFFICE VISIT (OUTPATIENT)
Dept: PHYSICAL THERAPY | Facility: CLINIC | Age: 66
End: 2021-08-16
Payer: MEDICARE

## 2021-08-16 DIAGNOSIS — M54.50 ACUTE LOW BACK PAIN, UNSPECIFIED BACK PAIN LATERALITY, UNSPECIFIED WHETHER SCIATICA PRESENT: Primary | ICD-10-CM

## 2021-08-16 PROCEDURE — 97110 THERAPEUTIC EXERCISES: CPT | Performed by: PHYSICAL THERAPIST

## 2021-08-16 PROCEDURE — 97140 MANUAL THERAPY 1/> REGIONS: CPT | Performed by: PHYSICAL THERAPIST

## 2021-08-16 PROCEDURE — 97112 NEUROMUSCULAR REEDUCATION: CPT | Performed by: PHYSICAL THERAPIST

## 2021-08-16 NOTE — PROGRESS NOTES
Daily Note     Today's date: 2021  Patient name: Gisela Woods  : 1955  MRN: [id number]  Referring provider: Braeden Song MD  Dx:   Encounter Diagnosis     ICD-10-CM    1  Acute low back pain, unspecified back pain laterality, unspecified whether sciatica present  M54 5        Start Time: 0845  Stop Time: 930  Total time in clinic (min): 45 minutes    Subjective: No new complaints today  The patient reports no change in symptoms since previous session  Objective: See treatment diary below      Assessment: The PT continued to gently progress the program during today's treatment  Leg press and multifidus press were both added to the patient's current program to promote tolerance for resistive training  The patient tolerated manual and active treatment well today  The patient would benefit from further skilled PT services  Plan: Continue per plan of care        Precautions: Osteopenia, anxiety, insomnia      Manuals 21 8 8        Prone P-A glide (gr II-III) SRB  SRB SRB SRB        Sidelying opening mob             Lumbar PSM STM   SRB SRB SRB                     Neuro Re-Ed             Hooklying TA set  3 sec x25 w/ tactile feedback, HEP 3 sec x25 w/ tactile feedback 3 sec x20 w/ cuing         Hooklying bridge  W/ glute set 3x10, HEP W/ glute set 3x10 HELD (pain)         Hooklying LTR  2x10 ea, HEP  2x10          Hooklying BKFO  2x10 ea, HEP 2x10 ea 2x10 ea         Supine SLR             Repeated motions   Sidebending to left 3x10 Sidebending to left 3x10         Hip ADD iso   W/ TA set x20          Sidelying clamshell     x30 ea side        Ther Ex             Active warmup  Recumb bike x6 min NuStep x6 min Recumb bike x6 min Recumb bike x6 min        PBall rollouts 3 ways  x15 ea           Hamstring stretch  At steps 10 sec hold x5 ea LE           SKTC   5 sec x10          Prone glute set    3 sec x25, HEP Review        Prone LSR ext    2x10 ea side HEP Review Leg press     50# 3x10        Multifidus press     GTB 2x10 ea way                     Ther Activity                                       Gait Training                                       Modalities                                       Assessment IE            Education Prognosis, HEP, POC

## 2021-08-18 ENCOUNTER — OFFICE VISIT (OUTPATIENT)
Dept: PHYSICAL THERAPY | Facility: CLINIC | Age: 66
End: 2021-08-18
Payer: MEDICARE

## 2021-08-18 DIAGNOSIS — M54.50 ACUTE LOW BACK PAIN, UNSPECIFIED BACK PAIN LATERALITY, UNSPECIFIED WHETHER SCIATICA PRESENT: Primary | ICD-10-CM

## 2021-08-18 PROCEDURE — 97112 NEUROMUSCULAR REEDUCATION: CPT | Performed by: PHYSICAL THERAPIST

## 2021-08-18 NOTE — PROGRESS NOTES
Daily Note     Today's date: 2021  Patient name: Himanshu Mckenzie  : 1955  MRN: [id number]  Referring provider: Fawn Lazaro MD  Dx:   Encounter Diagnosis     ICD-10-CM    1  Acute low back pain, unspecified back pain laterality, unspecified whether sciatica present  M54 5        Start Time: 0930  Stop Time: 1015  Total time in clinic (min): 45 minutes    Subjective: No new complaints today  The patient reports no change in symptoms since previous session  Objective: See treatment diary below      Assessment: The PT emphasized core control training during today's treatment  Use of the feedback cuff added to the patient's current program to promote improved recruitment of core-specific muscles  The patient tolerated manual and active treatment well today  The patient would benefit from further skilled PT services  Plan: Continue per plan of care        Precautions: Osteopenia, anxiety, insomnia      Manuals 21 8       Prone P-A glide (gr II-III) SRB  SRB SRB SRB SRB       Sidelying opening mob             Lumbar PSM STM   SRB SRB SRB SRB                    Neuro Re-Ed             Hooklying TA set  3 sec x25 w/ tactile feedback, HEP 3 sec x25 w/ tactile feedback 3 sec x20 w/ cuing  W/ feedback cuff 3 sec x30       Hooklying TA set w/ movement      x15 marching w/ feedback cuff       Hooklying bridge  W/ glute set 3x10, HEP W/ glute set 3x10 HELD (pain)         Hooklying LTR  2x10 ea, HEP  2x10          Hooklying BKFO  2x10 ea, HEP 2x10 ea 2x10 ea         Supine SLR             Repeated motions   Sidebending to left 3x10 Sidebending to left 3x10  Doorway hula x20 ea       Hip ADD iso   W/ TA set x20          Sidelying clamshell     x30 ea side        Ther Ex             Active warmup  Recumb bike x6 min NuStep x6 min Recumb bike x6 min Recumb bike x6 min Recumb bike x7 min       PBall rollouts 3 ways  x15 ea    x15 ea       Hamstring stretch  At steps 10 sec hold x5 ea LE           SKTC   5 sec x10          Prone glute set    3 sec x25, HEP Review        Prone LSR ext    2x10 ea side HEP Review        Leg press     50# 3x10        Multifidus press     GTB 2x10 ea way                     Ther Activity                                       Gait Training                                       Modalities                                       Assessment IE            Education Prognosis, HEP, POC

## 2021-08-23 ENCOUNTER — OFFICE VISIT (OUTPATIENT)
Dept: PHYSICAL THERAPY | Facility: CLINIC | Age: 66
End: 2021-08-23
Payer: MEDICARE

## 2021-08-23 DIAGNOSIS — M54.50 ACUTE LOW BACK PAIN, UNSPECIFIED BACK PAIN LATERALITY, UNSPECIFIED WHETHER SCIATICA PRESENT: Primary | ICD-10-CM

## 2021-08-23 PROCEDURE — 97112 NEUROMUSCULAR REEDUCATION: CPT | Performed by: PHYSICAL THERAPIST

## 2021-08-23 NOTE — PROGRESS NOTES
Daily Note     Today's date: 2021  Patient name: Corrinne Imam  : 1955  MRN: [id number]  Referring provider: Catrachita Olson MD  Dx:   Encounter Diagnosis     ICD-10-CM    1  Acute low back pain, unspecified back pain laterality, unspecified whether sciatica present  M54 5        Start Time:   Stop Time:   Total time in clinic (min): 45 minutes    Subjective: No new complaints today  The patient reports improvement in symptoms since previous session  Objective: See treatment diary below      Assessment: The PT continued to focus on core control during today's treatment  Supine PBall hs curls added to the patient's current program to promote improved muscle recruitment  The patient tolerated manual and active treatment well today  The patient would benefit from further skilled PT services  Plan: Continue per plan of care        Precautions: Osteopenia, anxiety, insomnia      Manuals 21 8/ 8      Prone P-A glide (gr II-III) SRB  SRB SRB SRB SRB SRB      Sidelying opening mob             Lumbar PSM STM   SRB SRB SRB SRB SRB                   Neuro Re-Ed             Hooklying TA set  3 sec x25 w/ tactile feedback, HEP 3 sec x25 w/ tactile feedback 3 sec x20 w/ cuing  W/ feedback cuff 3 sec x30 W/ feedback cuff 3 sec x30      Hooklying TA set w/ movement      x15 marching w/ feedback cuff x15 marching w/ feedback cuff      Hooklying bridge  W/ glute set 3x10, HEP W/ glute set 3x10 HELD (pain)         Hooklying LTR  2x10 ea, HEP  2x10    2x10      Hooklying BKFO  2x10 ea, HEP 2x10 ea 2x10 ea         Supine SLR             Repeated motions   Sidebending to left 3x10 Sidebending to left 3x10  Doorway hula x20 ea       Hip ADD iso   W/ TA set x20          Sidelying clamshell     x30 ea side  x30 ea side      PBall HS curls       2x25                                                          Ther Ex             Active warmup  Recumb bike x6 min NuStep x6 min Recumb bike x6 min Recumb bike x6 min Recumb bike x7 min Recumb bike x6 min      PBall rollouts 3 ways  x15 ea    x15 ea       Hamstring stretch  At steps 10 sec hold x5 ea LE           SKTC   5 sec x10          Prone glute set    3 sec x25, HEP Review        Prone LSR ext    2x10 ea side HEP Review        Leg press     50# 3x10        Multifidus press     GTB 2x10 ea way                     Ther Activity                                       Gait Training                                       Modalities                                       Assessment IE            Education Prognosis, HEP, POC

## 2021-08-26 ENCOUNTER — OFFICE VISIT (OUTPATIENT)
Dept: PHYSICAL THERAPY | Facility: CLINIC | Age: 66
End: 2021-08-26
Payer: MEDICARE

## 2021-08-26 DIAGNOSIS — M54.50 ACUTE LOW BACK PAIN, UNSPECIFIED BACK PAIN LATERALITY, UNSPECIFIED WHETHER SCIATICA PRESENT: Primary | ICD-10-CM

## 2021-08-26 PROCEDURE — 97110 THERAPEUTIC EXERCISES: CPT | Performed by: PHYSICAL THERAPIST

## 2021-08-26 PROCEDURE — 97140 MANUAL THERAPY 1/> REGIONS: CPT | Performed by: PHYSICAL THERAPIST

## 2021-08-26 PROCEDURE — 97112 NEUROMUSCULAR REEDUCATION: CPT | Performed by: PHYSICAL THERAPIST

## 2021-08-26 NOTE — PROGRESS NOTES
Daily Note     Today's date: 2021  Patient name: Violetta Munguia  : 1955  MRN: [id number]  Referring provider: Trang Snyder MD  Dx:   Encounter Diagnosis     ICD-10-CM    1  Acute low back pain, unspecified back pain laterality, unspecified whether sciatica present  M54 5        Start Time: 1533  Stop Time: 1615  Total time in clinic (min): 42 minutes    Subjective: No new complaints today  The patient reports improvement in symptoms since previous session  Objective: See treatment diary below      Assessment: The PT continued to advance core control training during today's treatment  Quadruped multifidus recruitment added to the patient's current program today  The patient tolerated manual and active treatment well today  The patient would benefit from further skilled PT services  Plan: Continue per plan of care        Precautions: Osteopenia, anxiety, insomnia      Manuals 21 8 8     Prone P-A glide (gr II-III) SRB  SRB SRB SRB SRB SRB SRB     Sidelying opening mob             Lumbar PSM STM   SRB SRB SRB SRB SRB SRB                  Neuro Re-Ed             Hooklying TA set  3 sec x25 w/ tactile feedback, HEP 3 sec x25 w/ tactile feedback 3 sec x20 w/ cuing  W/ feedback cuff 3 sec x30 W/ feedback cuff 3 sec x30 W/ feedback cuff 3 sec x30     Hooklying TA set w/ movement      x15 marching w/ feedback cuff x15 marching w/ feedback cuff x15 marching w/ feedback cuff     Hooklying bridge  W/ glute set 3x10, HEP W/ glute set 3x10 HELD (pain)         Hooklying LTR  2x10 ea, HEP  2x10    2x10      Hooklying BKFO  2x10 ea, HEP 2x10 ea 2x10 ea         Supine SLR             Repeated motions   Sidebending to left 3x10 Sidebending to left 3x10  Doorway hula x20 ea       Hip ADD iso   W/ TA set x20          Sidelying clamshell     x30 ea side  x30 ea side      PBall HS curls       2x25      Qped hip hike         2x20 ea Ther Ex             Active warmup  Recumb bike x6 min NuStep x6 min Recumb bike x6 min Recumb bike x6 min Recumb bike x7 min Recumb bike x6 min Recumb bike x6 min     PBall rollouts 3 ways  x15 ea    x15 ea       Hamstring stretch  At steps 10 sec hold x5 ea LE           SKTC   5 sec x10          Prone glute set    3 sec x25, HEP Review        Prone LSR ext    2x10 ea side HEP Review        Leg press     50# 3x10        Multifidus press     GTB 2x10 ea way   GTB 2x10 ea way                  Ther Activity                                       Gait Training                                       Modalities                                       Assessment IE            Education Prognosis, HEP, POC

## 2021-08-30 ENCOUNTER — OFFICE VISIT (OUTPATIENT)
Dept: PHYSICAL THERAPY | Facility: CLINIC | Age: 66
End: 2021-08-30
Payer: MEDICARE

## 2021-08-30 DIAGNOSIS — M54.50 ACUTE LOW BACK PAIN, UNSPECIFIED BACK PAIN LATERALITY, UNSPECIFIED WHETHER SCIATICA PRESENT: Primary | ICD-10-CM

## 2021-08-30 DIAGNOSIS — M25.562 ACUTE PAIN OF LEFT KNEE: ICD-10-CM

## 2021-08-30 PROCEDURE — 97110 THERAPEUTIC EXERCISES: CPT | Performed by: PHYSICAL THERAPIST

## 2021-08-30 PROCEDURE — 97140 MANUAL THERAPY 1/> REGIONS: CPT | Performed by: PHYSICAL THERAPIST

## 2021-08-30 PROCEDURE — 97112 NEUROMUSCULAR REEDUCATION: CPT | Performed by: PHYSICAL THERAPIST

## 2021-08-30 NOTE — PROGRESS NOTES
Direct Access Evaluation    Today's date: 2021  Patient name: Rony Mora  : 1955  MRN: [id number]  Referring provider: Pearl Paz MD  Dx:   Encounter Diagnosis     ICD-10-CM    1  Acute low back pain, unspecified back pain laterality, unspecified whether sciatica present  M54 5    2  Acute pain of left knee  M25 562        Start Time: 845  Stop Time: 930  Total time in clinic (min): 45 minutes    Subjective: The patient presents for Direct Access evaluation today  The patient reports that over the weekend she tried to use her leg to push a heavy object and has been noticing pain in her left knee ever since  The patient reports anterior knee pain during weightbearing, particularly while ascending and descending stairs  The patient's chief concern is making sure that she hasn't done anything to seriously damage her knee  Objective: See treatment diary below    Neurological Testing     Sensation   Left   Intact: light touch    Right   Intact: light touch     Reflexes   Left   Patellar (L4): trace (1+)  Achilles (S1): trace (1+)    Right   Patellar (L4): trace (1+)  Achilles (S1): trace (1+)    Active Range of Motion   Knee  Left  Flexion: WNL  Extension: WNL    Right  Flexion: WNL  Extension: WNL    Mobility   Patellar  Left  WNL all planes    Right  WNL all planes    Strength/Myotome Testing   Knee  Left  Flexion: 4+/5  Extension: 4+/5    Right  Flexion: 4+/5  Extension: 4+/5    Tests     Positive  Medial joint line palpation, distraction, eccentric step down    Negative  Catalina's, Thessaly's, lachman's, varus/valgus stress, [place] knee rules, ant/post drawer      Assessment: Rony Mora is a pleasant 77 y o  female who has been receiving PT intervention for low back pain   The patient now presents with an additional complaint of acute onset of left knee pain which is limiting her ability to care for self, exercise or recreation, perform household chores, perform yard work, squat to  objects from the floor, stand and navigate stairs  Primary Impairments:  1)  Intraarticular knee irritation, provoked with pressure and relieved with distraction  2)  Decreased quadriceps control during active loading    Knee goals Stanley [facility] 8/30/21)  Short Term Goals: to be achieved by 4 weeks  1) Patient to be independent with basic HEP  2) Decrease pain to 4/10 at its worst  3) Increase LE strength by 1/2 MMT grade in all affected planes    Long Term Goals: to be achieved by discharge  1) Ambulation to improve to maximal level of function  2) Stair negotiation will improve to reciprocal        Plan  Plan details: Begin treatment for report of left knee pain  Patient would benefit from: skilled physical therapy  Planned modality interventions: Modalities PRN    Planned therapy interventions: activity modification, manual therapy, neuromuscular re-education, patient education, therapeutic activities, therapeutic exercise, graded activity, home exercise program, behavior modification and self care  Frequency: 2x week  Duration in weeks: 8  Treatment plan discussed with: patient     Precautions: Osteopenia, anxiety, insomnia      Manuals 7/29/21 8/4 8/5 8/13 8/16 8/18 8/23 8/26 8/30    Prone P-A glide (gr II-III) SRB  SRB SRB SRB SRB SRB SRB     Sidelying opening mob             Lumbar PSM STM   SRB SRB SRB SRB SRB SRB                                            Seated tibial distraction         SRB    Seated tibial IR/ER         SRB                 Neuro Re-Ed             Hooklying TA set  3 sec x25 w/ tactile feedback, HEP 3 sec x25 w/ tactile feedback 3 sec x20 w/ cuing  W/ feedback cuff 3 sec x30 W/ feedback cuff 3 sec x30 W/ feedback cuff 3 sec x30     Hooklying TA set w/ movement      x15 marching w/ feedback cuff x15 marching w/ feedback cuff x15 marching w/ feedback cuff     Hooklying bridge  W/ glute set 3x10, HEP W/ glute set 3x10 HELD (pain)         Hooklying LTR  2x10 ea, HEP  2x10 2x10      Hooklying BKFO  2x10 ea, HEP 2x10 ea 2x10 ea         Supine SLR             Repeated motions   Sidebending to left 3x10 Sidebending to left 3x10  Doorway hula x20 ea       Hip ADD iso   W/ TA set x20          Sidelying clamshell     x30 ea side  x30 ea side      PBall HS curls       2x25      Qped hip hike         2x20 ea                                                         Seated LAQ         HEP    Standing TKE         HEP                              Ther Ex             Active warmup  Recumb bike x6 min NuStep x6 min Recumb bike x6 min Recumb bike x6 min Recumb bike x7 min Recumb bike x6 min Recumb bike x6 min Recumb bike x6 min    PBall rollouts 3 ways  x15 ea    x15 ea       Hamstring stretch  At steps 10 sec hold x5 ea LE           SKTC   5 sec x10          Prone glute set    3 sec x25, HEP Review        Prone LSR ext    2x10 ea side HEP Review        Leg press     50# 3x10        Multifidus press     GTB 2x10 ea way   GTB 2x10 ea way                  Ther Activity                                       Gait Training                                       Modalities                                       Assessment IE        DA Eval (left knee)    Education Prognosis, HEP, POC        Exam findings, Mehdi Ryan

## 2021-08-31 ENCOUNTER — HOSPITAL ENCOUNTER (OUTPATIENT)
Dept: RADIOLOGY | Age: 66
Discharge: HOME | End: 2021-08-31
Payer: MEDICARE

## 2021-08-31 VITALS — HEIGHT: 63 IN | WEIGHT: 150 LBS | BODY MASS INDEX: 26.58 KG/M2

## 2021-08-31 DIAGNOSIS — Z12.31 ENCOUNTER FOR SCREENING MAMMOGRAM FOR MALIGNANT NEOPLASM OF BREAST: ICD-10-CM

## 2021-08-31 DIAGNOSIS — Z13.820 ENCOUNTER FOR SCREENING FOR OSTEOPOROSIS: ICD-10-CM

## 2021-08-31 DIAGNOSIS — Z78.0 MENOPAUSE: ICD-10-CM

## 2021-08-31 PROCEDURE — 77063 BREAST TOMOSYNTHESIS BI: CPT

## 2021-08-31 PROCEDURE — 77080 DXA BONE DENSITY AXIAL: CPT

## 2021-08-31 PROCEDURE — 77067 SCR MAMMO BI INCL CAD: CPT

## 2021-09-01 ENCOUNTER — OFFICE VISIT (OUTPATIENT)
Dept: PHYSICAL THERAPY | Facility: CLINIC | Age: 66
End: 2021-09-01
Payer: MEDICARE

## 2021-09-01 DIAGNOSIS — M54.50 ACUTE LOW BACK PAIN, UNSPECIFIED BACK PAIN LATERALITY, UNSPECIFIED WHETHER SCIATICA PRESENT: Primary | ICD-10-CM

## 2021-09-01 DIAGNOSIS — M25.562 ACUTE PAIN OF LEFT KNEE: ICD-10-CM

## 2021-09-01 PROCEDURE — 97140 MANUAL THERAPY 1/> REGIONS: CPT | Performed by: PHYSICAL THERAPIST

## 2021-09-01 PROCEDURE — 97110 THERAPEUTIC EXERCISES: CPT | Performed by: PHYSICAL THERAPIST

## 2021-09-01 NOTE — PROGRESS NOTES
Daily Note     Today's date: 2021  Patient name: Katya Peterson  : 1955  MRN: [id number]  Referring provider: Andrés Kowalski MD  Dx:   Encounter Diagnosis     ICD-10-CM    1  Acute low back pain, unspecified back pain laterality, unspecified whether sciatica present  M54 5    2  Acute pain of left knee  M25 562        Start Time: 08  Stop Time: 930  Total time in clinic (min): 45 minutes    Subjective: No new complaints today  The patient reports improvement in symptoms since previous session, reports that she is noticing improvement in both her knee pain and her lower back pain  Objective: See treatment diary below      Assessment: The PT continued to promote core control while introducing therex for the knee during today's treatment  Introductory knee training regimen was added to the patient's current program to promote improvement in both complaints  The patient tolerated manual and active treatment well today  The patient would benefit from further skilled PT services  Plan: Continue per plan of care        Precautions: Osteopenia, anxiety, insomnia      Manuals 21 8/ 8/   Prone P-A glide (gr II-III) SRB  SRB SRB SRB SRB SRB SRB     Sidelying opening mob             Lumbar PSM STM   SRB SRB SRB SRB SRB SRB                               STM quad tendon          SRB   Seated tibial distraction         SRB SRB   Seated tibial IR/ER         SRB SRB                Neuro Re-Ed             Hooklying TA set  3 sec x25 w/ tactile feedback, HEP 3 sec x25 w/ tactile feedback 3 sec x20 w/ cuing  W/ feedback cuff 3 sec x30 W/ feedback cuff 3 sec x30 W/ feedback cuff 3 sec x30  W/ feedback cuff 3 sec x30   Hooklying TA set w/ movement      x15 marching w/ feedback cuff x15 marching w/ feedback cuff x15 marching w/ feedback cuff  x15 marching w/ feedback cuff   Hooklying bridge  W/ glute set 3x10, HEP W/ glute set 3x10 HELD (pain)         Hooklying LTR 2x10 ea, HEP  2x10    2x10      Hooklying BKFO  2x10 ea, HEP 2x10 ea 2x10 ea         Supine SLR             Repeated motions   Sidebending to left 3x10 Sidebending to left 3x10  Doorway hula x20 ea       Hip ADD iso   W/ TA set x20          Sidelying clamshell     x30 ea side  x30 ea side      PBall HS curls       2x25      Qped hip hike         2x20 ea  alternating x20 ea                                          Supine QS + SLR          2x10   Seated LAQ         HEP    Standing TKE         HEP 3x10                              Ther Ex             Active warmup  Recumb bike x6 min NuStep x6 min Recumb bike x6 min Recumb bike x6 min Recumb bike x7 min Recumb bike x6 min Recumb bike x6 min Recumb bike x6 min Recumb bike x8 min   PBall rollouts 3 ways  x15 ea    x15 ea       Hamstring stretch  At steps 10 sec hold x5 ea LE           SKTC   5 sec x10          Prone glute set    3 sec x25, HEP Review        Prone LSR ext    2x10 ea side HEP Review        Leg press     50# 3x10        Multifidus press     GTB 2x10 ea way   GTB 2x10 ea way                  Ther Activity                                       Gait Training                                       Modalities                                       Assessment IE        DA Eval (left knee)    Education Prognosis, HEP, POC        Exam findings, Rohan Bañuelos

## 2021-09-07 ENCOUNTER — OFFICE VISIT (OUTPATIENT)
Dept: PHYSICAL THERAPY | Facility: CLINIC | Age: 66
End: 2021-09-07
Payer: MEDICARE

## 2021-09-07 DIAGNOSIS — M25.562 ACUTE PAIN OF LEFT KNEE: ICD-10-CM

## 2021-09-07 DIAGNOSIS — M54.50 ACUTE LOW BACK PAIN, UNSPECIFIED BACK PAIN LATERALITY, UNSPECIFIED WHETHER SCIATICA PRESENT: Primary | ICD-10-CM

## 2021-09-07 PROCEDURE — 97112 NEUROMUSCULAR REEDUCATION: CPT | Performed by: PHYSICAL THERAPIST

## 2021-09-07 PROCEDURE — 97110 THERAPEUTIC EXERCISES: CPT | Performed by: PHYSICAL THERAPIST

## 2021-09-07 NOTE — PROGRESS NOTES
Daily Note     Today's date: 2021  Patient name: Medina Purcell  : 1955  MRN: [id number]  Referring provider: Isatu Nathan MD  Dx:   Encounter Diagnosis     ICD-10-CM    1  Acute low back pain, unspecified back pain laterality, unspecified whether sciatica present  M54 5    2  Acute pain of left knee  M25 562        Start Time: 1301  Stop Time: 09  Total time in clinic (min): 53 minutes    Subjective: The patient reports that the back pain was so significant that she wasn't able to perform any exercises  The patient also reports that the knee pain has decreased and walking down the stairs is not longer difficult however still not 100%  Objective: See treatment diary below      Assessment: The patient tolerated treatment well  The patient was progressed to TRA sets without the feedback pressure cuff  The patient was introduced the therapeutic exercise of leg press and presented with abductor weakness throughout the sets  To address the abductor weakness standing hip abduction was added to her POC to improve strength  Patient would benefit from continued skilled PT  Plan: Continue per plan of care        Precautions: Osteopenia, anxiety, insomnia      Manuals    Prone P-A glide (gr II-III)      SRB SRB SRB     Sidelying opening mob             Lumbar PSM STM      SRB SRB SRB                               STM quad tendon          SRB   Seated tibial distraction         SRB SRB   Seated tibial IR/ER         SRB SRB                Neuro Re-Ed             Hooklying TA set 3 sec x30     W/ feedback cuff 3 sec x30 W/ feedback cuff 3 sec x30 W/ feedback cuff 3 sec x30  W/ feedback cuff 3 sec x30   Hooklying TA set w/ movement x15 marching      x15 marching w/ feedback cuff x15 marching w/ feedback cuff x15 marching w/ feedback cuff  x15 marching w/ feedback cuff   Hooklying bridge             Hooklying LTR x15      2x10      Hooklying BKFO x15 Supine SLR 2x12 ea leg w/ TRA            Repeated motions      Doorway hula x20 ea       Hip ADD iso             Sidelying clamshell       x30 ea side      PBall HS curls       2x25      Qped hip hike         2x20 ea  alternating x20 ea                                          Supine QS + SLR          2x10   Seated LAQ         HEP    Standing TKE         HEP 3x10                              Ther Ex             Active warmup Recumb bike x8 mins      Recumb bike x7 min Recumb bike x6 min Recumb bike x6 min Recumb bike x6 min Recumb bike x8 min   PBall rollouts 3 ways      x15 ea       Hamstring stretch             SKTC             Prone glute set             Prone LSR ext             Leg press 35lbs   2x15            Multifidus press Kieser 7 5lbs   2x10 ea way        GTB 2x10 ea way     Standing hip abduction 2x10            Ther Activity                                       Gait Training                                       Modalities                                       Assessment         DA Heather (left knee)    Education         Exam findings, Rivas Garcia

## 2021-09-09 ENCOUNTER — OFFICE VISIT (OUTPATIENT)
Dept: PHYSICAL THERAPY | Facility: CLINIC | Age: 66
End: 2021-09-09
Payer: MEDICARE

## 2021-09-09 DIAGNOSIS — M25.562 ACUTE PAIN OF LEFT KNEE: ICD-10-CM

## 2021-09-09 DIAGNOSIS — M54.50 ACUTE LOW BACK PAIN, UNSPECIFIED BACK PAIN LATERALITY, UNSPECIFIED WHETHER SCIATICA PRESENT: Primary | ICD-10-CM

## 2021-09-09 PROCEDURE — 97112 NEUROMUSCULAR REEDUCATION: CPT | Performed by: PHYSICAL THERAPIST

## 2021-09-09 PROCEDURE — 97140 MANUAL THERAPY 1/> REGIONS: CPT | Performed by: PHYSICAL THERAPIST

## 2021-09-09 NOTE — PROGRESS NOTES
Daily Note     Today's date: 2021  Patient name: Rafa Brown  : 1955  MRN: [id number]  Referring provider: Kareen Olson MD  Dx:   Encounter Diagnosis     ICD-10-CM    1  Acute low back pain, unspecified back pain laterality, unspecified whether sciatica present  M54 5    2  Acute pain of left knee  M25 562        Start Time: 0845  Stop Time: 0940  Total time in clinic (min): 55 minutes     Treatment was performed by ALIDA Brown under the direct supervision of Tyron Rao PT, DPT, OCS      Subjective: The patient reports feeling reproduction of symptoms in the low back on the left side when sitting down today more than previously  The patient also reports having groin pain starting yesterday noting soreness when standing up from the couch and walking around  The patient also reports tenderness in the patella  Objective: See treatment diary below      Assessment: The patient was educated on how the increase of exercise can cause soreness in surrounding muscles in reference to her reports of new groin pain  The patient incorporated TA during hooklying LTR, noted feeling significantly less back pain when engaging the core  The patient was introduced to patella mobilizations and seated long arc quad to address the patella tenderness, noted feeling sore afterwards but not in pain  The patient tolerated active and manual treatment well  Patient would benefit from continued PT      Plan: Continue per plan of care        Precautions: Osteopenia, anxiety, insomnia      Manuals    Prone P-A glide (gr II-III)      SRB SRB SRB     Sidelying opening mob             Lumbar PSM STM      SRB SRB SRB                               STM quad tendon          SRB   Seated tibial distraction         SRB SRB   Seated tibial IR/ER         SRB SRB   Patella mobs  Medial lateral (gr 3)   Superior inferior (gr 3)  x8mins           Neuro Re-Ed             Hooklying TA set 3 sec x30 3sec x30     W/ feedback cuff 3 sec x30 W/ feedback cuff 3 sec x30 W/ feedback cuff 3 sec x30  W/ feedback cuff 3 sec x30   Hooklying TA set w/ movement x15 marching  x15 marching     x15 marching w/ feedback cuff x15 marching w/ feedback cuff x15 marching w/ feedback cuff  x15 marching w/ feedback cuff   Hooklying bridge             Hooklying LTR x15 x15     2x10      Hooklying BKFO x15  x15           Supine SLR 2x12 ea leg w/ TRA            Repeated motions      Doorway hula x20 ea       Hip ADD iso             Sidelying clamshell       x30 ea side      PBall HS curls       2x25      Qped hip hike         2x20 ea  alternating x20 ea                                          Supine QS + SLR          2x10   Seated LAQ         HEP    Standing TKE         HEP 3x10    Hooklying Abduction, adduction  2x10 2sec hold add volley ball  2x10 2 sec hold band                         Ther Ex             Active warmup Recumb bike x8 mins  Recum bike x8mins    Recumb bike x7 min Recumb bike x6 min Recumb bike x6 min Recumb bike x6 min Recumb bike x8 min   PBall rollouts 3 ways      x15 ea       Hamstring stretch             SKTC             Prone glute set             Prone LSR ext             Leg press 35lbs   2x15                         Multifidus press Kieser 7 5lbs   2x10 ea way        GTB 2x10 ea way     Standing hip abduction 2x10            Ther Activity                                       Gait Training                                       Modalities                                       Assessment         DA Heather (left knee)    Education         Exam findings, Dante Hamman

## 2021-09-13 ENCOUNTER — OFFICE VISIT (OUTPATIENT)
Dept: PHYSICAL THERAPY | Facility: CLINIC | Age: 66
End: 2021-09-13
Payer: MEDICARE

## 2021-09-13 DIAGNOSIS — M25.562 ACUTE PAIN OF LEFT KNEE: ICD-10-CM

## 2021-09-13 DIAGNOSIS — M54.50 ACUTE LOW BACK PAIN, UNSPECIFIED BACK PAIN LATERALITY, UNSPECIFIED WHETHER SCIATICA PRESENT: Primary | ICD-10-CM

## 2021-09-13 PROCEDURE — 97112 NEUROMUSCULAR REEDUCATION: CPT | Performed by: PHYSICAL THERAPIST

## 2021-09-13 PROCEDURE — 97110 THERAPEUTIC EXERCISES: CPT | Performed by: PHYSICAL THERAPIST

## 2021-09-13 NOTE — PROGRESS NOTES
Daily Note     Today's date: 2021  Patient name: Irena Robbins  : 1955  MRN: [id number]  Referring provider: Sharlene Murillo MD  Dx:   Encounter Diagnosis     ICD-10-CM    1  Acute low back pain, unspecified back pain laterality, unspecified whether sciatica present  M54 5    2  Acute pain of left knee  M25 562        Start Time: 08  Stop Time: 936  Total time in clinic (min): 51 minutes     Treatment was performed by ALIDA Nance under the direct supervision of Alice Osman PT, DPT, OCS      Subjective: The patient reports the pain in the low back as migrated slightly upwards on the left side  The patient reports improvement in the knee since previous session saying still no difficulty walking down stairs  The patient reports frustration with the low back pain  Objective: See treatment diary below      Assessment: The patient was educated on the options to continue with care or to go back to the doctor to address the pain  To address the soreness in the low back lumbar PSM STM was reintroduced, noted feeling some release of tension in the back  The patient was introduced to all bridges to address gluteal weakness, noted significant decrease in symptoms throughout the exercise modification  The patient tolerated active and manual treatment well  Patient would benefit from continued PT  Plan: Continue per plan of care        Precautions: Osteopenia, anxiety, insomnia      Manuals    Prone P-A glide (gr II-III)      SRB SRB SRB     Sidelying opening mob             Lumbar PSM STM   KG   SRB SRB SRB                               STM quad tendon          SRB   Seated tibial distraction         SRB SRB   Seated tibial IR/ER         SRB SRB   Patella mobs  Medial lateral (gr 3)   Superior inferior (gr 3)  x8mins           Neuro Re-Ed             Hooklying TA set 3 sec x30 3sec x30  3sec x30   W/ feedback cuff 3 sec x30 W/ feedback cuff 3 sec x30 W/ feedback cuff 3 sec x30  W/ feedback cuff 3 sec x30   Hooklying TA set w/ movement x15 marching  x15 marching     x15 marching w/ feedback cuff x15 marching w/ feedback cuff x15 marching w/ feedback cuff  x15 marching w/ feedback cuff   Hooklying bridge   PBall 3x10          Hooklying LTR x15 x15     2x10      Hooklying BKFO x15  x15 x15          Supine SLR 2x12 ea leg w/ TRA            Repeated motions      Doorway hula x20 ea       Hip ADD iso             Sidelying clamshell       x30 ea side      PBall HS curls       2x25      Qped hip hike         2x20 ea  alternating x20 ea                Donkey kicks   Prone 2x10 ea leg           Hip IR + ER   2x10 IR iso   2x10 ER iso          Supine QS + SLR          2x10   Seated LAQ         HEP    Standing TKE         HEP 3x10    Hooklying Abduction, adduction  2x10 2sec hold add volley ball  2x10 2 sec hold band                         Ther Ex             Active warmup Recumb bike x8 mins  Recum bike x8mins Recum bike x8mins   Recumb bike x7 min Recumb bike x6 min Recumb bike x6 min Recumb bike x6 min Recumb bike x8 min   PBall rollouts 3 ways      x15 ea       Hamstring stretch             SKTC             Prone glute set             Prone LSR ext             Leg press 35lbs   2x15                         Multifidus press Kieser 7 5lbs   2x10 ea way        GTB 2x10 ea way     Standing hip abduction 2x10            Ther Activity                                       Gait Training                                       Modalities                                       Assessment         DA Eval (left knee)    Education         Exam findings, Naomi Galvan

## 2021-09-15 ENCOUNTER — OFFICE VISIT (OUTPATIENT)
Dept: PHYSICAL THERAPY | Facility: CLINIC | Age: 66
End: 2021-09-15
Payer: MEDICARE

## 2021-09-15 DIAGNOSIS — M25.562 ACUTE PAIN OF LEFT KNEE: ICD-10-CM

## 2021-09-15 DIAGNOSIS — M54.50 ACUTE LOW BACK PAIN, UNSPECIFIED BACK PAIN LATERALITY, UNSPECIFIED WHETHER SCIATICA PRESENT: Primary | ICD-10-CM

## 2021-09-15 PROCEDURE — 97112 NEUROMUSCULAR REEDUCATION: CPT | Performed by: PHYSICAL THERAPIST

## 2021-09-15 PROCEDURE — 97110 THERAPEUTIC EXERCISES: CPT | Performed by: PHYSICAL THERAPIST

## 2021-09-15 PROCEDURE — 97140 MANUAL THERAPY 1/> REGIONS: CPT | Performed by: PHYSICAL THERAPIST

## 2021-09-15 NOTE — PROGRESS NOTES
Seated tibial distraction             Seated tibial IR/ER             Patella mobs  Medial lateral (gr 3)   Superior inferior (gr 3)  x8mins           Neuro Re-Ed             Hooklying TA set 3 sec x30 3sec x30  3sec x30 3sec x30          Hooklying TA set w/ movement x15 marching  x15 marching            Hooklying bridge   PBall 3x10 PBall  3x10         Hooklying LTR x15 x15           Hooklying BKFO x15  x15 x15          Supine SLR 2x12 ea leg w/ TRA            Repeated motions             Hip ADD iso             Sidelying clamshell             PBall HS curls             Qped hip hike              Prone press up    On elbows   2x10 1sec hold at top  HEP         Donkey kicks   Prone 2x10 ea leg           Hip IR + ER   2x10 IR iso   2x10 ER iso          Supine QS + SLR             Seated LAQ             Standing TKE             Hooklying Abduction, adduction  2x10 2sec hold add volley ball  2x10 2 sec hold band            Open book stretch    2x10 ea side 2sec hold         Ther Ex             Active warmup Recumb bike x8 mins  Recum bike x8mins Recum bike x8mins recum bike x8mins          PBall rollouts 3 ways             Hamstring stretch             SKTC             Prone glute set             Prone LSR ext             Leg press 35lbs   2x15                         Multifidus press Kieser 7 5lbs   2x10 ea way             Standing hip abduction 2x10            Ther Activity                                       Gait Training                                       Modalities                                       Assessment    Hip joint mobility assessment         Education

## 2021-09-20 ENCOUNTER — OFFICE VISIT (OUTPATIENT)
Dept: PHYSICAL THERAPY | Facility: CLINIC | Age: 66
End: 2021-09-20
Payer: MEDICARE

## 2021-09-20 DIAGNOSIS — M25.562 ACUTE PAIN OF LEFT KNEE: ICD-10-CM

## 2021-09-20 DIAGNOSIS — M54.50 ACUTE LOW BACK PAIN, UNSPECIFIED BACK PAIN LATERALITY, UNSPECIFIED WHETHER SCIATICA PRESENT: Primary | ICD-10-CM

## 2021-09-20 PROCEDURE — 97140 MANUAL THERAPY 1/> REGIONS: CPT | Performed by: PHYSICAL THERAPIST

## 2021-09-20 PROCEDURE — 97112 NEUROMUSCULAR REEDUCATION: CPT | Performed by: PHYSICAL THERAPIST

## 2021-09-20 NOTE — PROGRESS NOTES
Daily Note     Today's date: 2021  Patient name: Wong Pleitez  : 1955  MRN: [id number]  Referring provider: Byron Kawasaki, MD  Dx:   Encounter Diagnosis     ICD-10-CM    1  Acute low back pain, unspecified back pain laterality, unspecified whether sciatica present  M54 5    2  Acute pain of left knee  M25 562        Start Time: 3847  Stop Time: 0943  Total time in clinic (min): 53 minutes     Treatment was performed by ALIDA Ni under the direct supervision of Giorgi Eastman PT, DPT, OCS      Subjective: The patient reports that her low back has been bothering her in the middle of the night, she would try stretching it but it would cause more pain afterwards  Objective: See treatment diary below      Assessment:  The patient was introduced to long axis distraction, noted feeling relief in her low back  To address stretching her back, the PT taught BKTC and hooklying BKTC, both stretches caused tension in the hamstrings instead of the low back  The PT introduced 90/90 stretch, the patient was educated on length tension in the low back and the hamstring in relation to her symptoms  90/90 was added to her HEP  The patient tolerated active and manual treatment well  Patient exhibited good technique with therapeutic exercises and would benefit from continued PT      Plan: Continue per plan of care        Precautions: Osteopenia, anxiety, insomnia      Manuals 9/7 9/9 9/13 9/15 9/20        Prone P-A glide (gr II-III)             Sidelying opening mob             Lumbar PSM STM   KG KG KG        Inferior hip glides (gr II-III)    SRB         Long axis distraction     KG        STM quad tendon             Seated tibial distraction             Seated tibial IR/ER             Patella mobs  Medial lateral (gr 3)   Superior inferior (gr 3)  x8mins           Neuro Re-Ed             Hooklying TA set 3 sec x30 3sec x30  3sec x30 3sec x30  3 sec x30         Hooklying TA set w/ movement x15 marching  x15 marching            Hooklying bridge   PBall 3x10 PBall  3x10 [place]ball 3x10        Hooklying LTR x15 x15           Hooklying BKFO x15  x15 x15          Supine SLR 2x12 ea leg w/ TRA            Repeated motions             Hip ADD iso             Sidelying clamshell             PBall HS curls             Qped hip hike              Prone press up    On elbows   2x10 1sec hold at top  HEP         Donkey kicks   Prone 2x10 ea leg           Hip IR + ER   2x10 IR iso   2x10 ER iso          Supine QS + SLR             Seated LAQ             Standing TKE             Hooklying Abduction, adduction  2x10 2sec hold add volley ball  2x10 2 sec hold band            Open book stretch    2x10 ea side 2sec hold         90/90     3x10 2 sec hold  HEP         Ther Ex             Active warmup Recumb bike x8 mins  Recum bike x8mins Recum bike x8mins recum bike x8mins  Recum bike x8mins        PBall rollouts 3 ways             Hamstring stretch             SKTC             Prone glute set             Prone LSR ext             Leg press 35lbs   2x15                         Multifidus press Kieser 7 5lbs   2x10 ea way             Standing hip abduction 2x10            Ther Activity                                       Gait Training                                       Modalities                                       Assessment    Hip joint mobility assessment         Education

## 2021-09-22 ENCOUNTER — OFFICE VISIT (OUTPATIENT)
Dept: PHYSICAL THERAPY | Facility: CLINIC | Age: 66
End: 2021-09-22
Payer: MEDICARE

## 2021-09-22 DIAGNOSIS — M54.50 ACUTE LOW BACK PAIN, UNSPECIFIED BACK PAIN LATERALITY, UNSPECIFIED WHETHER SCIATICA PRESENT: Primary | ICD-10-CM

## 2021-09-22 DIAGNOSIS — M25.562 ACUTE PAIN OF LEFT KNEE: ICD-10-CM

## 2021-09-22 PROCEDURE — 97112 NEUROMUSCULAR REEDUCATION: CPT | Performed by: PHYSICAL THERAPIST

## 2021-09-22 PROCEDURE — 97140 MANUAL THERAPY 1/> REGIONS: CPT | Performed by: PHYSICAL THERAPIST

## 2021-09-22 NOTE — PROGRESS NOTES
Daily Note     Today's date: 2021  Patient name: Rafa Brown  : 1955  MRN: [id number]  Referring provider: Kareen Olson MD  Dx:   Encounter Diagnosis     ICD-10-CM    1  Acute low back pain, unspecified back pain laterality, unspecified whether sciatica present  M54 5    2  Acute pain of left knee  M25 562        Start Time: 08  Stop Time: 930  Total time in clinic (min): 44 minutes     Treatment was performed by ALIDA Brown under the direct supervision of Tyron Rao PT, DPT, OCS      Subjective: The patient reports no new complaints  The patient reported being able to crouch down to clean up the floor yesterday without pain, which she is excited about  Objective: See treatment diary below      Assessment: The patient noted having some questions about the form with the prone press ups, the patient was educated on how to perform properly, noted significantly easier with proper adjustments  The PT introduced manual hamstring stretch to address limitations with active 90/90 as well as introducing a manual gastroc stretch to address the "pulling" feeling in the lower leg  The patient tolerated active and manual treatment well, noting feeling better than how she came in  Patient exhibited good technique with therapeutic exercises and would benefit from continued PT      Plan: Continue per plan of care        Precautions: Osteopenia, anxiety, insomnia      Manuals 9/7 9/9 9/13 9/15 9/20 9/22       Prone P-A glide (gr II-III)             Sidelying opening mob             Lumbar PSM STM   KG KG KG KG       Inferior hip glides (gr II-III)    SRB         Long axis distraction     KG KG       STM quad tendon             Seated tibial distraction             Seated tibial IR/ER             Patella mobs  Medial lateral (gr 3)   Superior inferior (gr 3)  x8mins           Hamstring and gastroc stretch      KG       Neuro Re-Ed             Hooklying TA set 3 sec x30 3sec x30  3sec x30 3sec x30  3 sec x30         Hooklying TA set w/ movement x15 marching  x15 marching     x20 marching       Hooklying bridge   PBall 3x10 PBall  3x10 Pball 3x10 Pball 3x10       Hooklying LTR x15 x15    x10       Hooklying BKFO x15  x15 x15          Supine SLR 2x12 ea leg w/ TRA            Repeated motions             Hip ADD iso             Sidelying clamshell             PBall HS curls             Qped hip hike              Prone press up    On elbows   2x10 1sec hold at top  HEP  On elbows 2x10 1 sec hold at top        [employer]   Prone 2x10 ea leg           Hip IR + ER   2x10 IR iso   2x10 ER iso          Supine QS + SLR             Seated LAQ             Standing TKE             Hooklying Abduction, adduction  2x10 2sec hold add volley ball  2x10 2 sec hold band            Open book stretch    2x10 ea side 2sec hold         90/90     3x10 2 sec hold  HEP         Ther Ex             Active warmup Recumb bike x8 mins  Recum bike x8mins Recum bike x8mins recum bike x8mins  Recum bike x8mins recum bike x8mins        PBall rollouts 3 ways             Hamstring stretch             SKTC             Prone glute set             Prone LSR ext             Leg press 35lbs   2x15                         Multifidus press Kieser 7 5lbs   2x10 ea way             Standing hip abduction 2x10            Ther Activity                                       Gait Training                                       Modalities                                       Assessment    Hip joint mobility assessment         Education

## 2021-09-27 ENCOUNTER — OFFICE VISIT (OUTPATIENT)
Dept: PHYSICAL THERAPY | Facility: CLINIC | Age: 66
End: 2021-09-27
Payer: MEDICARE

## 2021-09-27 DIAGNOSIS — M54.50 ACUTE LOW BACK PAIN, UNSPECIFIED BACK PAIN LATERALITY, UNSPECIFIED WHETHER SCIATICA PRESENT: Primary | ICD-10-CM

## 2021-09-27 DIAGNOSIS — M25.562 ACUTE PAIN OF LEFT KNEE: ICD-10-CM

## 2021-09-27 PROCEDURE — 97140 MANUAL THERAPY 1/> REGIONS: CPT | Performed by: PHYSICAL THERAPIST

## 2021-09-27 PROCEDURE — 97110 THERAPEUTIC EXERCISES: CPT | Performed by: PHYSICAL THERAPIST

## 2021-09-27 NOTE — PROGRESS NOTES
Daily Note     Today's date: 2021  Patient name: Jorge Flores  : 1955  MRN: [id number]  Referring provider: Jaret Bundy MD  Dx:   Encounter Diagnosis     ICD-10-CM    1  Acute low back pain, unspecified back pain laterality, unspecified whether sciatica present  M54 5    2  Acute pain of left knee  M25 562        Start Time: 0845  Stop Time: 1118  Total time in clinic (min): 53 minutes    Subjective: The patient reports the symptoms in the back is starting to migrate lower  Objective: See treatment diary below      Assessment: To address the reported symptoms the PT introduced inferior and lateral hip glides and a piriformis release  The patient notes that the lateral hip glides makes the tender point feel like a stretch and feels good  The PT reintroduced hip IR and ER to strengthen the posterior chain, the patient notes feeling significantly better than when she walked in  The patient tolerated active and manual treatment well  Patient exhibited good technique with therapeutic exercises and would benefit from continued PT      Plan: Continue per plan of care        Precautions: Osteopenia, anxiety, insomnia      Manuals 9/7 9/9 9/13 9/15 9/20 9/22 9/27      Prone P-A glide (gr II-III)             Sidelying opening mob             Lumbar PSM STM   KG KG KG KG KG      Lateral hip glides (gr II-III)       KG      Inferior hip glides (gr II-III)    SRB   KG      Long axis distraction     KG KG KG      STM quad tendon             Seated tibial distraction             Seated tibial IR/ER             Patella mobs  Medial lateral (gr 3)   Superior inferior (gr 3)  x8mins           Hamstring and gastroc stretch      KG       Piriformis release        SRB      Neuro Re-Ed             Hooklying TA set 3 sec x30 3sec x30  3sec x30 3sec x30  3 sec x30         Hooklying TA set w/ movement x15 marching  x15 marching     x20 marching       Hooklying bridge   PBall 3x10 PBall  3x10 Pball 3x10 Pball 3x10       Hooklying LTR x15 x15    x10       Hooklying BKFO x15  x15 x15          Supine SLR 2x12 ea leg w/ TRA            Repeated motions             Hip ADD iso             Sidelying clamshell             PBall HS curls             Qped hip hike              Prone press up    On elbows   2x10 1sec hold at top  HEP  On elbows 2x10 1 sec hold at top        [employer]   Prone 2x10 ea leg           Hip IR + ER   2x10 IR iso   2x10 ER iso    2x10 IR prone GTB   2x10 ER prone iso bolster       Supine QS + SLR             Seated LAQ             Standing TKE             Hooklying Abduction, adduction  2x10 2sec hold add volley ball  2x10 2 sec hold band            Open book stretch    2x10 ea side 2sec hold         90/90     3x10 2 sec hold  HEP         Ther Ex             Active warmup Recumb bike x8 mins  Recum bike x8mins Recum bike x8mins recum bike x8mins  Recum bike x8mins recum bike x8mins  Recum bike x8mins       PBall rollouts 3 ways             Hamstring stretch             SKTC             Prone glute set             Prone LSR ext             Leg press 35lbs   2x15                         Multifidus press Kieser 7 5lbs   2x10 ea way             Standing hip abduction 2x10            Ther Activity                                       Gait Training                                       Modalities                                       Assessment    Hip joint mobility assessment         Education

## 2021-09-30 ENCOUNTER — OFFICE VISIT (OUTPATIENT)
Dept: PHYSICAL THERAPY | Facility: CLINIC | Age: 66
End: 2021-09-30
Payer: MEDICARE

## 2021-09-30 DIAGNOSIS — M25.562 ACUTE PAIN OF LEFT KNEE: ICD-10-CM

## 2021-09-30 DIAGNOSIS — M54.50 ACUTE LOW BACK PAIN, UNSPECIFIED BACK PAIN LATERALITY, UNSPECIFIED WHETHER SCIATICA PRESENT: Primary | ICD-10-CM

## 2021-09-30 PROCEDURE — 97112 NEUROMUSCULAR REEDUCATION: CPT | Performed by: PHYSICAL THERAPIST

## 2021-09-30 PROCEDURE — 97140 MANUAL THERAPY 1/> REGIONS: CPT | Performed by: PHYSICAL THERAPIST

## 2021-09-30 NOTE — PROGRESS NOTES
Daily Note      Today's date: 2021  Patient name: Violetta Munguia  : 1955  MRN: [id number]  Referring provider: Trang Snyder MD  Dx:   Encounter Diagnosis     ICD-10-CM    1  Acute low back pain, unspecified back pain laterality, unspecified whether sciatica present  M54 5    2  Acute pain of left knee  M25 562        Start Time: 0845  Stop Time: 930  Total time in clinic (min): 45 minutes    Treatment was performed by ALIDA Granados under the direct supervision of Kanika Cummings PT, DPT, OCS      Subjective: The patient reports no new complaints  The patient reports improvement of symptoms since previous session  Objective: See treatment diary below      Assessment: The PT continued emphasizing hip mobility, core and lower extremity strengthening  The patient tolerated active and manual treatment well  Patient demonstrated fatigue post treatment, exhibited good technique with therapeutic exercises and would benefit from continued PT      Plan: Continue per plan of care        Precautions: Osteopenia, anxiety, insomnia      Manuals 9/7 9/9 9/13 9/15 9/20 9/22 9/27 9/30     Prone P-A glide (gr II-III)             Sidelying opening mob             Lumbar PSM STM   KG KG KG KG KG KG     Lateral hip glides (gr II-III)       KG KG     Inferior hip glides (gr II-III)    SRB   KG      Long axis distraction     KG KG KG KG     STM quad tendon             Seated tibial distraction             Seated tibial IR/ER             Patella mobs  Medial lateral (gr 3)   Superior inferior (gr 3)  x8mins           Hamstring and gastroc stretch      KG       Piriformis release        SRB      Neuro Re-Ed             Hooklying TA set 3 sec x30 3sec x30  3sec x30 3sec x30  3 sec x30         Hooklying TA set w/ movement x15 marching  x15 marching     x20 marching  x15 marching      Hooklying bridge   PBall 3x10 PBall  3x10 Pball 3x10 Pball 3x10  Pball 3x15     Hooklying LTR x15 x15    x10       Hooklying BKFO x15  x15 x15          Supine SLR 2x12 ea leg w/ TRA            Repeated motions             Hip ADD iso             Sidelying clamshell             PBall HS curls             Qped hip hike              Prone press up    On elbows   2x10 1sec hold at top  HEP  On elbows 2x10 1 sec hold at top        [employer]   Prone 2x10 ea leg           Hip IR + ER   2x10 IR iso   2x10 ER iso    2x10 IR prone GTB   2x10 ER prone iso bolster  2x10 prone IR + ER GTB     2x10 ER iso volleyball     Supine QS + SLR             Seated LAQ             Standing TKE             Hooklying Abduction, adduction  2x10 2sec hold add volley ball  2x10 2 sec hold band            Open book stretch    2x10 ea side 2sec hold         90/90     3x10 2 sec hold  HEP         Ther Ex             Active warmup Recumb bike x8 mins  Recum bike x8mins Recum bike x8mins recum bike x8mins  Recum bike x8mins recum bike x8mins  Recum bike x8mins  Recumb bike x8mins      PBall rollouts 3 ways             Hamstring stretch             SKTC             Prone glute set             Prone LSR ext             Leg press 35lbs   2x15                         Multifidus press Kieser 7 5lbs   2x10 ea way             Standing hip abduction 2x10            Ther Activity                                       Gait Training                                       Modalities                                       Assessment    Hip joint mobility assessment         Education

## 2021-10-04 ENCOUNTER — OFFICE VISIT (OUTPATIENT)
Dept: PHYSICAL THERAPY | Facility: CLINIC | Age: 66
End: 2021-10-04
Payer: MEDICARE

## 2021-10-04 DIAGNOSIS — M25.562 ACUTE PAIN OF LEFT KNEE: ICD-10-CM

## 2021-10-04 DIAGNOSIS — M54.50 ACUTE LOW BACK PAIN, UNSPECIFIED BACK PAIN LATERALITY, UNSPECIFIED WHETHER SCIATICA PRESENT: Primary | ICD-10-CM

## 2021-10-04 PROCEDURE — 97140 MANUAL THERAPY 1/> REGIONS: CPT | Performed by: PHYSICAL THERAPIST

## 2021-10-04 PROCEDURE — 97112 NEUROMUSCULAR REEDUCATION: CPT | Performed by: PHYSICAL THERAPIST

## 2021-10-07 ENCOUNTER — OFFICE VISIT (OUTPATIENT)
Dept: PHYSICAL THERAPY | Facility: CLINIC | Age: 66
End: 2021-10-07
Payer: MEDICARE

## 2021-10-07 DIAGNOSIS — M25.562 ACUTE PAIN OF LEFT KNEE: ICD-10-CM

## 2021-10-07 DIAGNOSIS — M54.50 ACUTE LOW BACK PAIN, UNSPECIFIED BACK PAIN LATERALITY, UNSPECIFIED WHETHER SCIATICA PRESENT: Primary | ICD-10-CM

## 2021-10-07 PROCEDURE — 97140 MANUAL THERAPY 1/> REGIONS: CPT | Performed by: PHYSICAL THERAPIST

## 2021-10-07 PROCEDURE — 97112 NEUROMUSCULAR REEDUCATION: CPT | Performed by: PHYSICAL THERAPIST

## 2021-10-11 ENCOUNTER — OFFICE VISIT (OUTPATIENT)
Dept: PHYSICAL THERAPY | Facility: CLINIC | Age: 66
End: 2021-10-11
Payer: MEDICARE

## 2021-10-11 DIAGNOSIS — M54.50 ACUTE LOW BACK PAIN, UNSPECIFIED BACK PAIN LATERALITY, UNSPECIFIED WHETHER SCIATICA PRESENT: Primary | ICD-10-CM

## 2021-10-11 DIAGNOSIS — M25.562 ACUTE PAIN OF LEFT KNEE: ICD-10-CM

## 2021-10-11 PROCEDURE — 97110 THERAPEUTIC EXERCISES: CPT | Performed by: PHYSICAL THERAPIST

## 2021-10-11 PROCEDURE — 97140 MANUAL THERAPY 1/> REGIONS: CPT | Performed by: PHYSICAL THERAPIST

## 2021-10-14 ENCOUNTER — OFFICE VISIT (OUTPATIENT)
Dept: PHYSICAL THERAPY | Facility: CLINIC | Age: 66
End: 2021-10-14
Payer: MEDICARE

## 2021-10-14 DIAGNOSIS — M54.50 ACUTE LOW BACK PAIN, UNSPECIFIED BACK PAIN LATERALITY, UNSPECIFIED WHETHER SCIATICA PRESENT: Primary | ICD-10-CM

## 2021-10-14 DIAGNOSIS — M25.562 ACUTE PAIN OF LEFT KNEE: ICD-10-CM

## 2021-10-14 PROCEDURE — 97112 NEUROMUSCULAR REEDUCATION: CPT | Performed by: PHYSICAL THERAPIST

## 2021-10-14 PROCEDURE — 97140 MANUAL THERAPY 1/> REGIONS: CPT | Performed by: PHYSICAL THERAPIST

## 2021-10-18 ENCOUNTER — OFFICE VISIT (OUTPATIENT)
Dept: PHYSICAL THERAPY | Facility: CLINIC | Age: 66
End: 2021-10-18
Payer: MEDICARE

## 2021-10-18 DIAGNOSIS — M54.50 ACUTE LOW BACK PAIN, UNSPECIFIED BACK PAIN LATERALITY, UNSPECIFIED WHETHER SCIATICA PRESENT: Primary | ICD-10-CM

## 2021-10-18 DIAGNOSIS — M25.562 ACUTE PAIN OF LEFT KNEE: ICD-10-CM

## 2021-10-18 PROCEDURE — 97112 NEUROMUSCULAR REEDUCATION: CPT | Performed by: PHYSICAL THERAPIST

## 2021-10-18 PROCEDURE — 97140 MANUAL THERAPY 1/> REGIONS: CPT | Performed by: PHYSICAL THERAPIST

## 2021-10-21 ENCOUNTER — APPOINTMENT (OUTPATIENT)
Dept: PHYSICAL THERAPY | Facility: CLINIC | Age: 66
End: 2021-10-21
Payer: MEDICARE

## 2021-10-25 ENCOUNTER — OFFICE VISIT (OUTPATIENT)
Dept: PHYSICAL THERAPY | Facility: CLINIC | Age: 66
End: 2021-10-25
Payer: MEDICARE

## 2021-10-25 DIAGNOSIS — M54.50 ACUTE LOW BACK PAIN, UNSPECIFIED BACK PAIN LATERALITY, UNSPECIFIED WHETHER SCIATICA PRESENT: Primary | ICD-10-CM

## 2021-10-25 DIAGNOSIS — M25.562 ACUTE PAIN OF LEFT KNEE: ICD-10-CM

## 2021-10-25 PROCEDURE — 97112 NEUROMUSCULAR REEDUCATION: CPT | Performed by: PHYSICAL THERAPIST

## 2021-10-25 PROCEDURE — 97140 MANUAL THERAPY 1/> REGIONS: CPT | Performed by: PHYSICAL THERAPIST

## 2021-10-28 ENCOUNTER — OFFICE VISIT (OUTPATIENT)
Dept: PHYSICAL THERAPY | Facility: CLINIC | Age: 66
End: 2021-10-28
Payer: MEDICARE

## 2021-10-28 DIAGNOSIS — M54.50 ACUTE LOW BACK PAIN, UNSPECIFIED BACK PAIN LATERALITY, UNSPECIFIED WHETHER SCIATICA PRESENT: Primary | ICD-10-CM

## 2021-10-28 DIAGNOSIS — M25.562 ACUTE PAIN OF LEFT KNEE: ICD-10-CM

## 2021-10-28 PROCEDURE — 97112 NEUROMUSCULAR REEDUCATION: CPT | Performed by: PHYSICAL THERAPIST

## 2021-10-28 PROCEDURE — 97140 MANUAL THERAPY 1/> REGIONS: CPT | Performed by: PHYSICAL THERAPIST

## 2021-10-28 PROCEDURE — 97110 THERAPEUTIC EXERCISES: CPT | Performed by: PHYSICAL THERAPIST

## 2021-11-01 ENCOUNTER — OFFICE VISIT (OUTPATIENT)
Dept: PHYSICAL THERAPY | Facility: CLINIC | Age: 66
End: 2021-11-01
Payer: MEDICARE

## 2021-11-01 DIAGNOSIS — M25.562 ACUTE PAIN OF LEFT KNEE: ICD-10-CM

## 2021-11-01 DIAGNOSIS — M54.50 ACUTE LOW BACK PAIN, UNSPECIFIED BACK PAIN LATERALITY, UNSPECIFIED WHETHER SCIATICA PRESENT: Primary | ICD-10-CM

## 2021-11-01 PROCEDURE — 97140 MANUAL THERAPY 1/> REGIONS: CPT | Performed by: PHYSICAL THERAPIST

## 2021-11-01 PROCEDURE — 97112 NEUROMUSCULAR REEDUCATION: CPT | Performed by: PHYSICAL THERAPIST

## 2021-11-02 ENCOUNTER — OFFICE VISIT (OUTPATIENT)
Dept: OBGYN CLINIC | Facility: CLINIC | Age: 66
End: 2021-11-02
Payer: MEDICARE

## 2021-11-02 VITALS — BODY MASS INDEX: 26.58 KG/M2 | HEIGHT: 63 IN | WEIGHT: 150 LBS

## 2021-11-02 DIAGNOSIS — M54.50 ACUTE LEFT-SIDED LOW BACK PAIN WITHOUT SCIATICA: Primary | ICD-10-CM

## 2021-11-02 PROCEDURE — 99214 OFFICE O/P EST MOD 30 MIN: CPT | Performed by: ORTHOPAEDIC SURGERY

## 2021-11-03 ENCOUNTER — EVALUATION (OUTPATIENT)
Dept: PHYSICAL THERAPY | Facility: CLINIC | Age: 66
End: 2021-11-03
Payer: MEDICARE

## 2021-11-03 DIAGNOSIS — M54.50 ACUTE LOW BACK PAIN, UNSPECIFIED BACK PAIN LATERALITY, UNSPECIFIED WHETHER SCIATICA PRESENT: Primary | ICD-10-CM

## 2021-11-03 DIAGNOSIS — M25.562 ACUTE PAIN OF LEFT KNEE: ICD-10-CM

## 2021-11-03 PROCEDURE — 97112 NEUROMUSCULAR REEDUCATION: CPT | Performed by: PHYSICAL THERAPIST

## 2021-11-03 PROCEDURE — 97140 MANUAL THERAPY 1/> REGIONS: CPT | Performed by: PHYSICAL THERAPIST

## 2021-11-03 PROCEDURE — 97110 THERAPEUTIC EXERCISES: CPT | Performed by: PHYSICAL THERAPIST

## 2021-11-08 ENCOUNTER — OFFICE VISIT (OUTPATIENT)
Dept: PHYSICAL THERAPY | Facility: CLINIC | Age: 66
End: 2021-11-08
Payer: MEDICARE

## 2021-11-08 DIAGNOSIS — M54.50 ACUTE LOW BACK PAIN, UNSPECIFIED BACK PAIN LATERALITY, UNSPECIFIED WHETHER SCIATICA PRESENT: Primary | ICD-10-CM

## 2021-11-08 DIAGNOSIS — M25.562 ACUTE PAIN OF LEFT KNEE: ICD-10-CM

## 2021-11-08 PROCEDURE — 97112 NEUROMUSCULAR REEDUCATION: CPT | Performed by: PHYSICAL THERAPIST

## 2021-11-08 PROCEDURE — 97140 MANUAL THERAPY 1/> REGIONS: CPT | Performed by: PHYSICAL THERAPIST

## 2021-11-10 ENCOUNTER — OFFICE VISIT (OUTPATIENT)
Dept: PHYSICAL THERAPY | Facility: CLINIC | Age: 66
End: 2021-11-10
Payer: MEDICARE

## 2021-11-10 DIAGNOSIS — M25.562 ACUTE PAIN OF LEFT KNEE: ICD-10-CM

## 2021-11-10 DIAGNOSIS — M54.50 ACUTE LOW BACK PAIN, UNSPECIFIED BACK PAIN LATERALITY, UNSPECIFIED WHETHER SCIATICA PRESENT: Primary | ICD-10-CM

## 2021-11-10 PROCEDURE — 97140 MANUAL THERAPY 1/> REGIONS: CPT | Performed by: PHYSICAL THERAPIST

## 2021-11-10 PROCEDURE — 97110 THERAPEUTIC EXERCISES: CPT | Performed by: PHYSICAL THERAPIST

## 2021-11-10 PROCEDURE — 97112 NEUROMUSCULAR REEDUCATION: CPT | Performed by: PHYSICAL THERAPIST

## 2021-11-15 ENCOUNTER — OFFICE VISIT (OUTPATIENT)
Dept: PHYSICAL THERAPY | Facility: CLINIC | Age: 66
End: 2021-11-15
Payer: MEDICARE

## 2021-11-15 DIAGNOSIS — M25.562 ACUTE PAIN OF LEFT KNEE: ICD-10-CM

## 2021-11-15 DIAGNOSIS — M54.50 ACUTE LOW BACK PAIN, UNSPECIFIED BACK PAIN LATERALITY, UNSPECIFIED WHETHER SCIATICA PRESENT: Primary | ICD-10-CM

## 2021-11-15 PROCEDURE — 97112 NEUROMUSCULAR REEDUCATION: CPT | Performed by: PHYSICAL THERAPIST

## 2021-11-15 PROCEDURE — 97140 MANUAL THERAPY 1/> REGIONS: CPT | Performed by: PHYSICAL THERAPIST

## 2021-11-17 ENCOUNTER — OFFICE VISIT (OUTPATIENT)
Dept: PHYSICAL THERAPY | Facility: CLINIC | Age: 66
End: 2021-11-17
Payer: MEDICARE

## 2021-11-17 DIAGNOSIS — M25.562 ACUTE PAIN OF LEFT KNEE: ICD-10-CM

## 2021-11-17 DIAGNOSIS — M54.50 ACUTE LOW BACK PAIN, UNSPECIFIED BACK PAIN LATERALITY, UNSPECIFIED WHETHER SCIATICA PRESENT: Primary | ICD-10-CM

## 2021-11-17 PROCEDURE — 97140 MANUAL THERAPY 1/> REGIONS: CPT

## 2021-11-17 PROCEDURE — 97110 THERAPEUTIC EXERCISES: CPT

## 2021-11-17 PROCEDURE — 97112 NEUROMUSCULAR REEDUCATION: CPT

## 2021-11-22 ENCOUNTER — OFFICE VISIT (OUTPATIENT)
Dept: PHYSICAL THERAPY | Facility: CLINIC | Age: 66
End: 2021-11-22
Payer: MEDICARE

## 2021-11-22 DIAGNOSIS — M25.562 ACUTE PAIN OF LEFT KNEE: ICD-10-CM

## 2021-11-22 DIAGNOSIS — M54.50 ACUTE LOW BACK PAIN, UNSPECIFIED BACK PAIN LATERALITY, UNSPECIFIED WHETHER SCIATICA PRESENT: Primary | ICD-10-CM

## 2021-11-22 PROCEDURE — 97140 MANUAL THERAPY 1/> REGIONS: CPT | Performed by: PHYSICAL THERAPIST

## 2021-11-22 PROCEDURE — 97112 NEUROMUSCULAR REEDUCATION: CPT | Performed by: PHYSICAL THERAPIST

## 2021-11-24 ENCOUNTER — OFFICE VISIT (OUTPATIENT)
Dept: PHYSICAL THERAPY | Facility: CLINIC | Age: 66
End: 2021-11-24
Payer: MEDICARE

## 2021-11-24 DIAGNOSIS — M54.50 ACUTE LOW BACK PAIN, UNSPECIFIED BACK PAIN LATERALITY, UNSPECIFIED WHETHER SCIATICA PRESENT: Primary | ICD-10-CM

## 2021-11-24 DIAGNOSIS — M25.562 ACUTE PAIN OF LEFT KNEE: ICD-10-CM

## 2021-11-24 PROCEDURE — 97110 THERAPEUTIC EXERCISES: CPT | Performed by: PHYSICAL THERAPIST

## 2021-11-24 PROCEDURE — 97112 NEUROMUSCULAR REEDUCATION: CPT | Performed by: PHYSICAL THERAPIST

## 2021-11-24 PROCEDURE — 97140 MANUAL THERAPY 1/> REGIONS: CPT | Performed by: PHYSICAL THERAPIST

## 2021-11-27 ENCOUNTER — IMMUNIZATIONS (OUTPATIENT)
Dept: FAMILY MEDICINE CLINIC | Facility: HOSPITAL | Age: 66
End: 2021-11-27

## 2021-11-27 DIAGNOSIS — Z23 ENCOUNTER FOR IMMUNIZATION: Primary | ICD-10-CM

## 2021-11-27 PROCEDURE — 91300 COVID-19 PFIZER VACC 0.3 ML: CPT

## 2021-11-27 PROCEDURE — 0001A COVID-19 PFIZER VACC 0.3 ML: CPT

## 2021-12-01 ENCOUNTER — APPOINTMENT (OUTPATIENT)
Dept: PHYSICAL THERAPY | Facility: CLINIC | Age: 66
End: 2021-12-01
Payer: MEDICARE

## 2021-12-03 ENCOUNTER — OFFICE VISIT (OUTPATIENT)
Dept: PHYSICAL THERAPY | Facility: CLINIC | Age: 66
End: 2021-12-03
Payer: MEDICARE

## 2021-12-03 DIAGNOSIS — M54.50 ACUTE LOW BACK PAIN, UNSPECIFIED BACK PAIN LATERALITY, UNSPECIFIED WHETHER SCIATICA PRESENT: ICD-10-CM

## 2021-12-03 DIAGNOSIS — M25.562 ACUTE PAIN OF LEFT KNEE: Primary | ICD-10-CM

## 2021-12-03 PROCEDURE — 97140 MANUAL THERAPY 1/> REGIONS: CPT

## 2021-12-03 PROCEDURE — 97112 NEUROMUSCULAR REEDUCATION: CPT

## 2021-12-03 PROCEDURE — 97110 THERAPEUTIC EXERCISES: CPT

## 2021-12-06 ENCOUNTER — HOSPITAL ENCOUNTER (OUTPATIENT)
Dept: ULTRASOUND IMAGING | Facility: HOSPITAL | Age: 66
Discharge: HOME | End: 2021-12-06
Attending: UROLOGY
Payer: MEDICARE

## 2021-12-06 DIAGNOSIS — N20.0 NEPHROLITHIASIS: ICD-10-CM

## 2021-12-06 DIAGNOSIS — N28.1 RENAL CYST, ACQUIRED, LEFT: ICD-10-CM

## 2021-12-06 PROCEDURE — 76770 US EXAM ABDO BACK WALL COMP: CPT

## 2021-12-07 ENCOUNTER — OFFICE VISIT (OUTPATIENT)
Dept: PHYSICAL THERAPY | Facility: CLINIC | Age: 66
End: 2021-12-07
Payer: MEDICARE

## 2021-12-07 DIAGNOSIS — M25.562 ACUTE PAIN OF LEFT KNEE: Primary | ICD-10-CM

## 2021-12-07 DIAGNOSIS — M54.50 ACUTE LOW BACK PAIN, UNSPECIFIED BACK PAIN LATERALITY, UNSPECIFIED WHETHER SCIATICA PRESENT: ICD-10-CM

## 2021-12-07 PROCEDURE — 97112 NEUROMUSCULAR REEDUCATION: CPT | Performed by: PHYSICAL THERAPIST

## 2021-12-07 PROCEDURE — 97140 MANUAL THERAPY 1/> REGIONS: CPT | Performed by: PHYSICAL THERAPIST

## 2021-12-09 ENCOUNTER — OFFICE VISIT (OUTPATIENT)
Dept: PHYSICAL THERAPY | Facility: CLINIC | Age: 66
End: 2021-12-09
Payer: MEDICARE

## 2021-12-09 DIAGNOSIS — M54.50 ACUTE LOW BACK PAIN, UNSPECIFIED BACK PAIN LATERALITY, UNSPECIFIED WHETHER SCIATICA PRESENT: ICD-10-CM

## 2021-12-09 DIAGNOSIS — M25.562 ACUTE PAIN OF LEFT KNEE: Primary | ICD-10-CM

## 2021-12-09 PROCEDURE — 97140 MANUAL THERAPY 1/> REGIONS: CPT | Performed by: PHYSICAL THERAPIST

## 2021-12-09 PROCEDURE — 97112 NEUROMUSCULAR REEDUCATION: CPT | Performed by: PHYSICAL THERAPIST

## 2021-12-09 PROCEDURE — 97110 THERAPEUTIC EXERCISES: CPT | Performed by: PHYSICAL THERAPIST

## 2021-12-10 DIAGNOSIS — E78.01 FAMILIAL HYPERCHOLESTEROLEMIA: ICD-10-CM

## 2021-12-10 RX ORDER — SIMVASTATIN 40 MG
40 TABLET ORAL DAILY
Qty: 90 TABLET | Refills: 0 | Status: SHIPPED | OUTPATIENT
Start: 2021-12-10 | End: 2022-03-21

## 2021-12-13 ENCOUNTER — EVALUATION (OUTPATIENT)
Dept: PHYSICAL THERAPY | Facility: CLINIC | Age: 66
End: 2021-12-13
Payer: MEDICARE

## 2021-12-13 DIAGNOSIS — M54.50 ACUTE LOW BACK PAIN, UNSPECIFIED BACK PAIN LATERALITY, UNSPECIFIED WHETHER SCIATICA PRESENT: ICD-10-CM

## 2021-12-13 DIAGNOSIS — M25.562 ACUTE PAIN OF LEFT KNEE: Primary | ICD-10-CM

## 2021-12-13 PROCEDURE — 97112 NEUROMUSCULAR REEDUCATION: CPT | Performed by: PHYSICAL THERAPIST

## 2021-12-13 PROCEDURE — 97140 MANUAL THERAPY 1/> REGIONS: CPT | Performed by: PHYSICAL THERAPIST

## 2021-12-15 ENCOUNTER — OFFICE VISIT (OUTPATIENT)
Dept: PHYSICAL THERAPY | Facility: CLINIC | Age: 66
End: 2021-12-15
Payer: MEDICARE

## 2021-12-15 DIAGNOSIS — M25.562 ACUTE PAIN OF LEFT KNEE: Primary | ICD-10-CM

## 2021-12-15 DIAGNOSIS — M54.50 ACUTE LOW BACK PAIN, UNSPECIFIED BACK PAIN LATERALITY, UNSPECIFIED WHETHER SCIATICA PRESENT: ICD-10-CM

## 2021-12-15 PROCEDURE — 97112 NEUROMUSCULAR REEDUCATION: CPT | Performed by: PHYSICAL THERAPIST

## 2021-12-15 PROCEDURE — 97140 MANUAL THERAPY 1/> REGIONS: CPT | Performed by: PHYSICAL THERAPIST

## 2021-12-17 ENCOUNTER — OFFICE VISIT (OUTPATIENT)
Dept: UROLOGY | Facility: CLINIC | Age: 66
End: 2021-12-17
Payer: MEDICARE

## 2021-12-17 VITALS
HEIGHT: 63 IN | BODY MASS INDEX: 26.4 KG/M2 | DIASTOLIC BLOOD PRESSURE: 84 MMHG | SYSTOLIC BLOOD PRESSURE: 122 MMHG | WEIGHT: 149 LBS

## 2021-12-17 DIAGNOSIS — N28.1 RENAL CYST, ACQUIRED, LEFT: ICD-10-CM

## 2021-12-17 DIAGNOSIS — N20.0 NEPHROLITHIASIS: Primary | ICD-10-CM

## 2021-12-17 LAB
POST-VOID RESIDUAL VOLUME, ML POC: 0 ML
SL AMB  POCT GLUCOSE, UA: NORMAL
SL AMB LEUKOCYTE ESTERASE,UA: NORMAL
SL AMB POCT BILIRUBIN,UA: NORMAL
SL AMB POCT BLOOD,UA: NORMAL
SL AMB POCT CLARITY,UA: CLEAR
SL AMB POCT COLOR,UA: YELLOW
SL AMB POCT KETONES,UA: NORMAL
SL AMB POCT NITRITE,UA: NORMAL
SL AMB POCT PH,UA: 5
SL AMB POCT SPECIFIC GRAVITY,UA: 1.03
SL AMB POCT URINE PROTEIN: NORMAL
SL AMB POCT UROBILINOGEN: NORMAL

## 2021-12-17 PROCEDURE — 99213 OFFICE O/P EST LOW 20 MIN: CPT | Performed by: PHYSICIAN ASSISTANT

## 2021-12-17 PROCEDURE — 81002 URINALYSIS NONAUTO W/O SCOPE: CPT | Performed by: PHYSICIAN ASSISTANT

## 2021-12-17 PROCEDURE — 51798 US URINE CAPACITY MEASURE: CPT | Performed by: PHYSICIAN ASSISTANT

## 2021-12-20 ENCOUNTER — OFFICE VISIT (OUTPATIENT)
Dept: PHYSICAL THERAPY | Facility: CLINIC | Age: 66
End: 2021-12-20
Payer: MEDICARE

## 2021-12-20 DIAGNOSIS — M25.562 ACUTE PAIN OF LEFT KNEE: Primary | ICD-10-CM

## 2021-12-20 DIAGNOSIS — M54.50 ACUTE LOW BACK PAIN, UNSPECIFIED BACK PAIN LATERALITY, UNSPECIFIED WHETHER SCIATICA PRESENT: ICD-10-CM

## 2021-12-20 PROCEDURE — 97112 NEUROMUSCULAR REEDUCATION: CPT | Performed by: PHYSICAL THERAPIST

## 2021-12-20 PROCEDURE — 97140 MANUAL THERAPY 1/> REGIONS: CPT | Performed by: PHYSICAL THERAPIST

## 2021-12-22 ENCOUNTER — APPOINTMENT (OUTPATIENT)
Dept: PHYSICAL THERAPY | Facility: CLINIC | Age: 66
End: 2021-12-22
Payer: MEDICARE

## 2021-12-22 NOTE — PROGRESS NOTES
Daily Note     Today's date: 2021  Patient name: Matthew Cardona  : 1955  MRN: [id number]  Referring provider: Pavel Matamoros MD  Dx:   No diagnosis found  Subjective:     No new complaints today  The patient reports some change in symptoms since previous session  Objective: See treatment diary below      Assessment:     The PT continued with the current program during today's session  No new interventions added to the patient's current program to promote consistency  The patient tolerated manual and active treatment well today  The patient would benefit from further skilled PT services  Plan: Continue per plan of care        Precautions: Osteopenia, anxiety, insomnia      Manuals 11/22 11/24 12/3 12/7 12/9 12/13 12/15 12/20     Prone P-A glide (gr II-III) SRB SRB  SRB SRB SRB SRB SRB     Sidelying opening mob             Lumbar PSM STM SRB SRB MC SRB SRB SRB SRB SRB     Lateral hip glides (gr II-III) SRB SRB  SRB SRB SRB SRB SRB     Inferior hip glides (gr II-III)             Long axis distraction SRB SRB MC SRB SRB SRB SRB SRB     STM quad tendon             Seated tibial distraction             Seated tibial IR/ER             Patella mobs             Hamstring and gastroc stretch             Piriformis release  SRB SRB MC SRB SRB SRB SRB SRB     Neuro Re-Ed                                       Hooklying bridge HS curl, feet on ball x30 HS curl, feet on ball x30 HS curl, feet on ball x30 HS curl, feet on ball x30 HS curl, feet on ball x30 HS curl, feet on ball x30 HS curl, feet on ball x30 HS curl, feet on ball x30     Hooklying LTR W/ heels on PBall x30 W/ heels on PBall x30 W/ heels on PBall x30 W/ heels on PBall x30 W/ heels on PBall x30 W/ heels on PBall x30 W/ heels on PBall x30 W/ heels on PBall x30     Hooklying BKFO             Sidelying SLR             Repeated motions  standing ext against table 3x10 standing ext against table 3x10  standing ext against table 3x10 standing ext against table 3x10 standing ext against table 3x10 standing ext against table 3x10     Hip ADD iso             Sidelying clamshell Supine unilat ER 2x10 ea side Supine unilat ER 2x10 ea side Supine unilat ER 2x10 ea side          PBall HS curls             Qped hip hike              Prone press up             Donkey kicks             Hip IR + ER 2x30 IR GTB,   2x10 ER prone iso bolster 3 sec x30   IR GTB :03x30,    ER prone iso bolster :03x30   IR GTB :03x30,    ER prone iso bolster :03x30 IR GTB :03x30,    ER prone iso bolster :03x30 IR GTB :03x30,    ER prone iso bolster :03x30 IR GTB :03x30,    ER prone iso bolster :03x30     Standing hip adduction                                       Hooklying Abduction, adduction             Open book stretch             90/90             Ther Ex             Active warmup Recumb bike x8mins Recumb bike x8mins Recumb bike x8mins Recumb bike x8mins Recumb bike x8mins Recumb bike x8mins Recumb bike x8mins Recumb bike x8mins     PBall rollouts 3 ways             Hamstring stretch             SKTC             Prone glute set  W/ hip ext x25 ea W/ hip ext x25 ea W/ hip ext x25 ea W/ hip ext x25 ea W/ hip ext x25 ea W/ hip ext x25 ea W/ hip ext x25 ea     Prone SLR ext x25 ea x25 ea  x25 ea  x25 ea x25 ea x25 ea                                                         Ther Activity             Deadlifts                          Gait Training                                       Modalities                                       Assessment      RE       Education      [employer]

## 2021-12-27 ENCOUNTER — OFFICE VISIT (OUTPATIENT)
Dept: PHYSICAL THERAPY | Facility: CLINIC | Age: 66
End: 2021-12-27
Payer: MEDICARE

## 2021-12-27 DIAGNOSIS — M25.562 ACUTE PAIN OF LEFT KNEE: Primary | ICD-10-CM

## 2021-12-27 DIAGNOSIS — M54.50 ACUTE LOW BACK PAIN, UNSPECIFIED BACK PAIN LATERALITY, UNSPECIFIED WHETHER SCIATICA PRESENT: ICD-10-CM

## 2021-12-27 PROCEDURE — 97140 MANUAL THERAPY 1/> REGIONS: CPT | Performed by: PHYSICAL THERAPIST

## 2021-12-27 PROCEDURE — 97112 NEUROMUSCULAR REEDUCATION: CPT | Performed by: PHYSICAL THERAPIST

## 2021-12-29 ENCOUNTER — OFFICE VISIT (OUTPATIENT)
Dept: PHYSICAL THERAPY | Facility: CLINIC | Age: 66
End: 2021-12-29
Payer: MEDICARE

## 2021-12-29 DIAGNOSIS — M54.50 ACUTE LOW BACK PAIN, UNSPECIFIED BACK PAIN LATERALITY, UNSPECIFIED WHETHER SCIATICA PRESENT: ICD-10-CM

## 2021-12-29 DIAGNOSIS — M25.562 ACUTE PAIN OF LEFT KNEE: Primary | ICD-10-CM

## 2021-12-29 PROCEDURE — 97140 MANUAL THERAPY 1/> REGIONS: CPT | Performed by: PHYSICAL THERAPIST

## 2021-12-29 PROCEDURE — 97112 NEUROMUSCULAR REEDUCATION: CPT | Performed by: PHYSICAL THERAPIST

## 2021-12-29 PROCEDURE — 97110 THERAPEUTIC EXERCISES: CPT | Performed by: PHYSICAL THERAPIST

## 2022-01-13 ENCOUNTER — APPOINTMENT (OUTPATIENT)
Dept: LAB | Facility: CLINIC | Age: 67
End: 2022-01-13
Payer: MEDICARE

## 2022-01-13 DIAGNOSIS — E78.2 MIXED HYPERLIPIDEMIA: ICD-10-CM

## 2022-01-13 DIAGNOSIS — E04.1 THYROID NODULE: ICD-10-CM

## 2022-01-13 LAB
ALBUMIN SERPL BCP-MCNC: 4 G/DL (ref 3.5–5)
ALP SERPL-CCNC: 75 U/L (ref 46–116)
ALT SERPL W P-5'-P-CCNC: 24 U/L (ref 12–78)
ANION GAP SERPL CALCULATED.3IONS-SCNC: 4 MMOL/L (ref 4–13)
AST SERPL W P-5'-P-CCNC: 14 U/L (ref 5–45)
BILIRUB SERPL-MCNC: 0.7 MG/DL (ref 0.2–1)
BUN SERPL-MCNC: 17 MG/DL (ref 5–25)
CALCIUM SERPL-MCNC: 9.8 MG/DL (ref 8.3–10.1)
CHLORIDE SERPL-SCNC: 107 MMOL/L (ref 100–108)
CHOLEST SERPL-MCNC: 184 MG/DL
CO2 SERPL-SCNC: 28 MMOL/L (ref 21–32)
CREAT SERPL-MCNC: 0.94 MG/DL (ref 0.6–1.3)
ERYTHROCYTE [DISTWIDTH] IN BLOOD BY AUTOMATED COUNT: 13.9 % (ref 11.6–15.1)
GFR SERPL CREATININE-BSD FRML MDRD: 62 ML/MIN/1.73SQ M
GLUCOSE P FAST SERPL-MCNC: 89 MG/DL (ref 65–99)
HCT VFR BLD AUTO: 44.8 % (ref 34.8–46.1)
HDLC SERPL-MCNC: 58 MG/DL
HGB BLD-MCNC: 13.7 G/DL (ref 11.5–15.4)
LDLC SERPL CALC-MCNC: 96 MG/DL (ref 0–100)
MCH RBC QN AUTO: 29 PG (ref 26.8–34.3)
MCHC RBC AUTO-ENTMCNC: 30.6 G/DL (ref 31.4–37.4)
MCV RBC AUTO: 95 FL (ref 82–98)
PLATELET # BLD AUTO: 356 THOUSANDS/UL (ref 149–390)
PMV BLD AUTO: 10.2 FL (ref 8.9–12.7)
POTASSIUM SERPL-SCNC: 3.8 MMOL/L (ref 3.5–5.3)
PROT SERPL-MCNC: 8 G/DL (ref 6.4–8.2)
RBC # BLD AUTO: 4.72 MILLION/UL (ref 3.81–5.12)
SODIUM SERPL-SCNC: 139 MMOL/L (ref 136–145)
TRIGL SERPL-MCNC: 148 MG/DL
TSH SERPL DL<=0.05 MIU/L-ACNC: 2.26 UIU/ML (ref 0.36–3.74)
WBC # BLD AUTO: 9.58 THOUSAND/UL (ref 4.31–10.16)

## 2022-01-13 PROCEDURE — 84443 ASSAY THYROID STIM HORMONE: CPT

## 2022-01-13 PROCEDURE — 80061 LIPID PANEL: CPT

## 2022-01-13 PROCEDURE — 85027 COMPLETE CBC AUTOMATED: CPT

## 2022-01-13 PROCEDURE — 80053 COMPREHEN METABOLIC PANEL: CPT

## 2022-01-13 PROCEDURE — 36415 COLL VENOUS BLD VENIPUNCTURE: CPT

## 2022-01-24 ENCOUNTER — OFFICE VISIT (OUTPATIENT)
Dept: INTERNAL MEDICINE CLINIC | Facility: CLINIC | Age: 67
End: 2022-01-24
Payer: MEDICARE

## 2022-01-24 VITALS
SYSTOLIC BLOOD PRESSURE: 138 MMHG | WEIGHT: 150.2 LBS | RESPIRATION RATE: 16 BRPM | HEIGHT: 63 IN | OXYGEN SATURATION: 97 % | TEMPERATURE: 98.5 F | DIASTOLIC BLOOD PRESSURE: 96 MMHG | BODY MASS INDEX: 26.61 KG/M2 | HEART RATE: 82 BPM

## 2022-01-24 DIAGNOSIS — G47.00 INSOMNIA, CONTROLLED: ICD-10-CM

## 2022-01-24 DIAGNOSIS — M25.562 CHRONIC PAIN OF LEFT KNEE: ICD-10-CM

## 2022-01-24 DIAGNOSIS — E55.9 VITAMIN D DEFICIENCY: ICD-10-CM

## 2022-01-24 DIAGNOSIS — G89.29 CHRONIC PAIN OF LEFT KNEE: ICD-10-CM

## 2022-01-24 DIAGNOSIS — M54.50 CHRONIC LEFT-SIDED LOW BACK PAIN WITHOUT SCIATICA: ICD-10-CM

## 2022-01-24 DIAGNOSIS — E78.2 MIXED HYPERLIPIDEMIA: ICD-10-CM

## 2022-01-24 DIAGNOSIS — Z12.11 SCREEN FOR COLON CANCER: ICD-10-CM

## 2022-01-24 DIAGNOSIS — F41.1 GAD (GENERALIZED ANXIETY DISORDER): ICD-10-CM

## 2022-01-24 DIAGNOSIS — Z00.00 MEDICARE ANNUAL WELLNESS VISIT, SUBSEQUENT: Primary | ICD-10-CM

## 2022-01-24 DIAGNOSIS — G89.29 CHRONIC LEFT-SIDED LOW BACK PAIN WITHOUT SCIATICA: ICD-10-CM

## 2022-01-24 DIAGNOSIS — F41.9 ANXIETY: ICD-10-CM

## 2022-01-24 DIAGNOSIS — Z12.31 BREAST CANCER SCREENING BY MAMMOGRAM: ICD-10-CM

## 2022-01-24 PROCEDURE — G0439 PPPS, SUBSEQ VISIT: HCPCS | Performed by: INTERNAL MEDICINE

## 2022-01-24 RX ORDER — DIAZEPAM 5 MG/1
5 TABLET ORAL DAILY PRN
Qty: 30 TABLET | Refills: 0 | Status: SHIPPED | OUTPATIENT
Start: 2022-01-24 | End: 2022-01-24 | Stop reason: SDUPTHER

## 2022-01-24 RX ORDER — DIAZEPAM 5 MG/1
5 TABLET ORAL DAILY PRN
Qty: 30 TABLET | Refills: 0 | Status: SHIPPED | OUTPATIENT
Start: 2022-01-24 | End: 2022-07-25 | Stop reason: SDUPTHER

## 2022-01-24 RX ORDER — BUSPIRONE HYDROCHLORIDE 5 MG/1
10 TABLET ORAL DAILY
Qty: 60 TABLET | Refills: 5
Start: 2022-01-24 | End: 2022-04-18 | Stop reason: SDUPTHER

## 2022-01-24 RX ORDER — ZOLPIDEM TARTRATE 10 MG/1
10 TABLET ORAL
Qty: 30 TABLET | Refills: 0 | Status: SHIPPED | OUTPATIENT
Start: 2022-01-24

## 2022-01-24 NOTE — PROGRESS NOTES
Assessment and Plan:     Problem List Items Addressed This Visit        Other    Insomnia, controlled    Relevant Medications    zolpidem (AMBIEN) 10 mg tablet    Mixed hyperlipidemia    Relevant Orders    CBC and Platelet    Comprehensive metabolic panel    Lipid Panel with Direct LDL reflex    PROSPER (generalized anxiety disorder)     Takes buspirone PRN  Tried taking 5mg BID but the morning dole made her feel ill  Advised to try 10mg at bedtime  Limited use of Valiumm/zolpidem         Relevant Medications    busPIRone (BUSPAR) 5 mg tablet    zolpidem (AMBIEN) 10 mg tablet    diazepam (VALIUM) 5 mg tablet    Chronic left-sided low back pain without sciatica     Advised to take the meloxicam PRN  May use heating pads         Relevant Orders    Ambulatory Referral to Pain Management      Other Visit Diagnoses     Medicare annual wellness visit, subsequent    -  Primary    Anxiety        Relevant Medications    busPIRone (BUSPAR) 5 mg tablet    Screen for colon cancer        Relevant Orders    Ambulatory Referral to Gastroenterology    Breast cancer screening by mammogram        Relevant Orders    Mammo screening bilateral w 3d & cad    Chronic pain of left knee        Relevant Orders    XR knee 3 vw left non injury    Vitamin D deficiency        Relevant Orders    Vitamin D 25 hydroxy        BMI Counseling: Body mass index is 26 61 kg/m²  The BMI is above normal  Nutrition recommendations include encouraging healthy choices of fruits and vegetables, moderation in carbohydrate intake and reducing intake of saturated and trans fat  Rationale for BMI follow-up plan is due to patient being overweight or obese  Depression Screening and Follow-up Plan: Patient was screened for depression during today's encounter  They screened negative with a PHQ-2 score of 0        Preventive health issues were discussed with patient, and age appropriate screening tests were ordered as noted in patient's After Visit Summary  Personalized health advice and appropriate referrals for health education or preventive services given if needed, as noted in patient's After Visit Summary       History of Present Illness:     Patient presents for Medicare Annual Wellness visit    Patient Care Team:  Tigre Kunz MD as PCP - General (Internal Medicine)  Nathanial Hemming, MD Thurston Leventhal, MD (Otolaryngology)     Problem List:     Patient Active Problem List   Diagnosis    Sleep disturbance    PROSPER (generalized anxiety disorder)    Atrophic vaginitis    Chronic left-sided low back pain without sciatica    Eustachian tube disorder, bilateral    Insomnia, controlled    MVP (mitral valve prolapse)    Nephrolithiasis    Osteopenia    Mixed hyperlipidemia    Thyroid nodule    Recurrent UTI    Renal cyst, acquired, left    Glaucoma suspect of both eyes      Past Medical and Surgical History:     Past Medical History:   Diagnosis Date    Allergic     Anxiety     Chronic kidney disease     Hyperlipidemia     Recurrent UTI     Screening for AAA (abdominal aortic aneurysm) 5/3/2018    [facility]- check screen- last one done 2016- ectatic at 2 7  check labs     Screening for cardiovascular condition 5/3/2018    Tonsillitis      Past Surgical History:   Procedure Laterality Date    CHOLECYSTECTOMY      CHOLECYSTECTOMY OPEN      COLONOSCOPY  2014    HYSTERECTOMY N/A     uterus removed age 25   [facility] [address] Left     OOPHORECTOMY      SALIVARY GLAND SURGERY Right     excision of parotid tumor/gland    SALPINGOOPHORECTOMY Bilateral     age 48   [facility] TONSILLECTOMY        Family History:     Family History   Problem Relation Age of Onset    Kidney cancer Mother     Skin cancer Mother     Kidney nephrosis Mother     Aneurysm Father         abdominal aortic aneurysm    Nephrolithiasis Father     Kidney nephrosis Father     Breast cancer Sister 47    Breast cancer Maternal Grandmother 72        approximate age  Breast cancer Maternal Aunt 76    Stomach cancer Paternal Uncle     Hypertension Maternal Grandfather     Diabetes Maternal Grandfather     Sudden death Maternal Grandfather         cardiac    Polycystic ovary syndrome Daughter     Diabetes Paternal Grandfather     Coronary artery disease Neg Hx     Stroke Neg Hx     Arthritis Neg Hx     Hyperlipidemia Neg Hx       Social History:     Social History     Socioeconomic History    Marital status: /Civil Union     Spouse name: None    Number of children: 3    Years of education: None    Highest education level: None   Occupational History    None   Tobacco Use    Smoking status: Never Smoker    Smokeless tobacco: Never Used   Vaping Use    Vaping Use: Never used   Substance and Sexual Activity    Alcohol use: No    Drug use: No    Sexual activity: Yes     Birth control/protection: Post-menopausal   Other Topics Concern    None   Social History Narrative    None     Social Determinants of Health     Financial Resource Strain: Not on file   Food Insecurity: Not on file   Transportation Needs: Not on file   Physical Activity: Not on file   Stress: Not on file   Social Connections: Not on file   Intimate Partner Violence: Not on file   Housing Stability: Not on file      Medications and Allergies:     Current Outpatient Medications   Medication Sig Dispense Refill    cholecalciferol (VITAMIN D3) 1,000 units tablet Take 2,000 Units by mouth daily      diazepam (VALIUM) 5 mg tablet Take 1 tablet (5 mg total) by mouth daily as needed for anxiety No driving with this  No alcohol with this   Do not combine with zolpidem 30 tablet 0    estradiol (ESTRACE VAGINAL) 0 1 mg/g vaginal cream Insert 1 g into the vagina 2 (two) times a week 42 5 g 3    meloxicam (MOBIC) 15 mg tablet Take 1 tablet (15 mg total) by mouth daily 30 tablet 0    Multiple Vitamins-Minerals (CENTRUM SILVER ADULT 50+) TABS Take 1 tablet by mouth daily        Probiotic Product (PROBIOTIC COLON SUPPORT) CAPS Take 1 capsule by mouth daily        simvastatin (ZOCOR) 40 mg tablet Take 1 tablet (40 mg total) by mouth daily 90 tablet 0    Vaginal Lubricant (REPLENS) GEL Insert into the vagina      zolpidem (AMBIEN) 10 mg tablet Take 1 tablet (10 mg total) by mouth daily at bedtime as needed for sleep 30 tablet 0    busPIRone (BUSPAR) 5 mg tablet Take 2 tablets (10 mg total) by mouth in the morning 60 tablet 5    fluconazole (DIFLUCAN) 100 mg tablet  (Patient not taking: Reported on 1/24/2022 )       No current facility-administered medications for this visit  Allergies   Allergen Reactions    Augmentin [Amoxicillin-Pot Clavulanate] GI Intolerance    Macrobid [Nitrofurantoin] GI Intolerance      Immunizations:     Immunization History   Administered Date(s) Administered    COVID-19 PFIZER VACCINE 0 3 ML IM 02/15/2021, 03/07/2021, 11/27/2021      Health Maintenance:         Topic Date Due    Breast Cancer Screening: Mammogram  08/31/2023    Colorectal Cancer Screening  08/27/2025    Hepatitis C Screening  Completed     There are no preventive care reminders to display for this patient  Medicare Health Risk Assessment:     /96   Pulse 82   Temp 98 5 °F (36 9 °C)   Resp 16   Ht 5' 3" (1 6 m)   Wt 68 1 kg (150 lb 3 2 oz)   SpO2 97%   BMI 26 61 kg/m²      Kosta Delgado is here for her Subsequent Wellness visit  Health Risk Assessment:   Patient rates overall health as very good  Patient feels that their physical health rating is same  Patient is very satisfied with their life  Eyesight was rated as same  Hearing was rated as same  Patient feels that their emotional and mental health rating is same  Patients states they are never, rarely angry  Patient states they are never, rarely unusually tired/fatigued  Pain experienced in the last 7 days has been some  Patient's pain rating has been 3/10   Patient states that she has experienced weight loss or gain in last 6 months  Depression Screening:   PHQ-2 Score: 0      Fall Risk Screening: In the past year, patient has experienced: no history of falling in past year      Urinary Incontinence Screening:   Patient has not leaked urine accidently in the last six months  Home Safety:  Patient does not have trouble with stairs inside or outside of their home  Patient has working smoke alarms Home safety hazards include: none  Nutrition:   Current diet is Regular  Medications:   Patient is currently taking over-the-counter supplements  OTC medications include: see medication list  Patient is able to manage medications  Activities of Daily Living (ADLs)/Instrumental Activities of Daily Living (IADLs):   Walk and transfer into and out of bed and chair?: Yes  Dress and groom yourself?: Yes    Bathe or shower yourself?: Yes    Feed yourself? Yes  Do your laundry/housekeeping?: Yes  Manage your money, pay your bills and track your expenses?: Yes  Make your own meals?: Yes    Do your own shopping?: Yes    Previous Hospitalizations:   Any hospitalizations or ED visits within the last 12 months?: No      Advance Care Planning:   Living will: No    Durable POA for healthcare:  Yes    Advanced directive: Yes      Cognitive Screening:   Provider or family/friend/caregiver concerned regarding cognition?: No    PREVENTIVE SCREENINGS      Cardiovascular Screening:    General: Screening Not Indicated and History Lipid Disorder      Diabetes Screening:     General: Screening Current      Colorectal Cancer Screening:     General: Screening Current    Due for: Colonoscopy - Low Risk      Breast Cancer Screening:     General: Screening Current      Cervical Cancer Screening:    General: Screening Not Indicated      Osteoporosis Screening:    General: Screening Current      Abdominal Aortic Aneurysm (AAA) Screening:        General: Screening Current      Lung Cancer Screening:     General: Screening Not Indicated      Hepatitis C Screening:    General: Screening Current    Screening, Brief Intervention, and Referral to Treatment (SBIRT)    Screening  Typical number of drinks in a day: 0  Typical number of drinks in a week: 0  Interpretation: Low risk drinking behavior  Single Item Drug Screening:  How often have you used an illegal drug (including marijuana) or a prescription medication for non-medical reasons in the past year? never    Single Item Drug Screen Score: 0  Interpretation: Negative screen for possible drug use disorder    Review of Systems   Constitutional: Negative for chills, fever and unexpected weight change  HENT: Positive for postnasal drip  Respiratory: Negative for cough and shortness of breath  Cardiovascular: Negative for chest pain and palpitations  Gastrointestinal: Positive for abdominal pain  Negative for constipation and diarrhea  Genitourinary: Negative for difficulty urinating  Musculoskeletal: Positive for arthralgias (left knee) and back pain (left sided)  Physical Exam  Constitutional:       General: She is not in acute distress  Appearance: She is well-developed  She is not ill-appearing, toxic-appearing or diaphoretic  HENT:      Head: Normocephalic and atraumatic  Eyes:      Conjunctiva/sclera: Conjunctivae normal    Cardiovascular:      Rate and Rhythm: Normal rate and regular rhythm  Heart sounds: Normal heart sounds  No murmur heard  Pulmonary:      Effort: Pulmonary effort is normal  No respiratory distress  Breath sounds: Normal breath sounds  No stridor  No wheezing or rales  Abdominal:      General: There is no distension  Palpations: Abdomen is soft  There is no mass  Tenderness: There is generalized abdominal tenderness  There is no guarding or rebound  Musculoskeletal:         General: Tenderness (left SI; negative SLR; no tenderness over the left knee) present  Cervical back: Neck supple  Right lower leg: No edema        Left lower leg: No edema  Skin:     General: Skin is warm and dry  Neurological:      Mental Status: She is alert and oriented to person, place, and time  Psychiatric:         Mood and Affect: Mood is anxious  Behavior: Behavior normal          Thought Content:  Thought content normal          Judgment: Judgment normal            Sravanthi Flowers MD

## 2022-01-24 NOTE — ASSESSMENT & PLAN NOTE
Takes buspirone PRN  Tried taking 5mg BID but the morning dole made her feel ill  Advised to try 10mg at bedtime  Limited use of Valiumm/zolpidem

## 2022-01-24 NOTE — PATIENT INSTRUCTIONS
Medicare Preventive Visit Patient Instructions  Thank you for completing your Welcome to Medicare Visit or Medicare Annual Wellness Visit today  Your next wellness visit will be due in one year (1/25/2023)  The screening/preventive services that you may require over the next 5-10 years are detailed below  Some tests may not apply to you based off risk factors and/or age  Screening tests ordered at today's visit but not completed yet may show as past due  Also, please note that scanned in results may not display below  Preventive Screenings:  Service Recommendations Previous Testing/Comments   Colorectal Cancer Screening  * Colonoscopy    * Fecal Occult Blood Test (FOBT)/Fecal Immunochemical Test (FIT)  * Fecal DNA/Cologuard Test  * Flexible Sigmoidoscopy Age: 54-65 years old   Colonoscopy: every 10 years (may be performed more frequently if at higher risk)  OR  FOBT/FIT: every 1 year  OR  Cologuard: every 3 years  OR  Sigmoidoscopy: every 5 years  Screening may be recommended earlier than age 48 if at higher risk for colorectal cancer  Also, an individualized decision between you and your healthcare provider will decide whether screening between the ages of 74-80 would be appropriate  Colonoscopy: 08/27/2015  FOBT/FIT: Not on file  Cologuard: Not on file  Sigmoidoscopy: Not on file          Breast Cancer Screening Age: 36 years old  Frequency: every 1-2 years  Not required if history of left and right mastectomy Mammogram: 08/31/2021        Cervical Cancer Screening Between the ages of 21-29, pap smear recommended once every 3 years  Between the ages of 33-67, can perform pap smear with HPV co-testing every 5 years     Recommendations may differ for women with a history of total hysterectomy, cervical cancer, or abnormal pap smears in past  Pap Smear: 10/20/2020        Hepatitis C Screening Once for adults born between 1945 and 1965  More frequently in patients at high risk for Hepatitis C Hep C Antibody: 06/06/2019        Diabetes Screening 1-2 times per year if you're at risk for diabetes or have pre-diabetes Fasting glucose: 89 mg/dL   A1C: No results in last 5 years        Cholesterol Screening Once every 5 years if you don't have a lipid disorder  May order more often based on risk factors  Lipid panel: 01/13/2022          Other Preventive Screenings Covered by Medicare:  1  Abdominal Aortic Aneurysm (AAA) Screening: covered once if your at risk  You're considered to be at risk if you have a family history of AAA  2  Lung Cancer Screening: covers low dose CT scan once per year if you meet all of the following conditions: (1) Age 50-69; (2) No signs or symptoms of lung cancer; (3) Current smoker or have quit smoking within the last 15 years; (4) You have a tobacco smoking history of at least 30 pack years (packs per day multiplied by number of years you smoked); (5) You get a written order from a healthcare provider  3  Glaucoma Screening: covered annually if you're considered high risk: (1) You have diabetes OR (2) Family history of glaucoma OR (3)  aged 48 and older OR (3)  American aged 72 and older  3  Osteoporosis Screening: covered every 2 years if you meet one of the following conditions: (1) You're estrogen deficient and at risk for osteoporosis based off medical history and other findings; (2) Have a vertebral abnormality; (3) On glucocorticoid therapy for more than 3 months; (4) Have primary hyperparathyroidism; (5) On osteoporosis medications and need to assess response to drug therapy  · Last bone density test (DXA Scan): 08/31/2021   5  HIV Screening: covered annually if you're between the age of 15-65  Also covered annually if you are younger than 13 and older than 72 with risk factors for HIV infection  For pregnant patients, it is covered up to 3 times per pregnancy      Immunizations:  Immunization Recommendations   Influenza Vaccine Annual influenza vaccination during flu season is recommended for all persons aged >= 6 months who do not have contraindications   Pneumococcal Vaccine (Prevnar and Pneumovax)  * Prevnar = PCV13  * Pneumovax = PPSV23   Adults 25-60 years old: 1-3 doses may be recommended based on certain risk factors  Adults 72 years old: Prevnar (PCV13) vaccine recommended followed by Pneumovax (PPSV23) vaccine  If already received PPSV23 since turning 65, then PCV13 recommended at least one year after PPSV23 dose  Hepatitis B Vaccine 3 dose series if at intermediate or high risk (ex: diabetes, end stage renal disease, liver disease)   Tetanus (Td) Vaccine - COST NOT COVERED BY MEDICARE PART B Following completion of primary series, a booster dose should be given every 10 years to maintain immunity against tetanus  Td may also be given as tetanus wound prophylaxis  Tdap Vaccine - COST NOT COVERED BY MEDICARE PART B Recommended at least once for all adults  For pregnant patients, recommended with each pregnancy  Shingles Vaccine (Shingrix) - COST NOT COVERED BY MEDICARE PART B  2 shot series recommended in those aged 48 and above     Health Maintenance Due:      Topic Date Due    Breast Cancer Screening: Mammogram  08/31/2023    Colorectal Cancer Screening  08/27/2025    Hepatitis C Screening  Completed     Immunizations Due:      Topic Date Due    Pneumococcal Vaccine: 65+ Years (1 of 1 - PPSV23) Never done    Influenza Vaccine (1) Never done     Advance Directives   What are advance directives? Advance directives are legal documents that state your wishes and plans for medical care  These plans are made ahead of time in case you lose your ability to make decisions for yourself  Advance directives can apply to any medical decision, such as the treatments you want, and if you want to donate organs  What are the types of advance directives? There are many types of advance directives, and each state has rules about how to use them   You may choose a combination of any of the following:  · Living will: This is a written record of the treatment you want  You can also choose which treatments you do not want, which to limit, and which to stop at a certain time  This includes surgery, medicine, IV fluid, and tube feedings  · Durable power of  for healthcare [facility]): This is a written record that states who you want to make healthcare choices for you when you are unable to make them for yourself  This person, called a proxy, is usually a family member or a friend  You may choose more than 1 proxy  · Do not resuscitate (DNR) order:  A DNR order is used in case your heart stops beating or you stop breathing  It is a request not to have certain forms of treatment, such as CPR  A DNR order may be included in other types of advance directives  · Medical directive: This covers the care that you want if you are in a coma, near death, or unable to make decisions for yourself  You can list the treatments you want for each condition  Treatment may include pain medicine, surgery, blood transfusions, dialysis, IV or tube feedings, and a ventilator (breathing machine)  · Values history: This document has questions about your views, beliefs, and how you feel and think about life  This information can help others choose the care that you would choose  Why are advance directives important? An advance directive helps you control your care  Although spoken wishes may be used, it is better to have your wishes written down  Spoken wishes can be misunderstood, or not followed  Treatments may be given even if you do not want them  An advance directive may make it easier for your family to make difficult choices about your care  Weight Management   Why it is important to manage your weight:  Being overweight increases your risk of health conditions such as heart disease, high blood pressure, type 2 diabetes, and certain types of cancer   It can also increase your risk for osteoarthritis, sleep apnea, and other respiratory problems  Aim for a slow, steady weight loss  Even a small amount of weight loss can lower your risk of health problems  How to lose weight safely:  A safe and healthy way to lose weight is to eat fewer calories and get regular exercise  You can lose up about 1 pound a week by decreasing the number of calories you eat by 500 calories each day  Healthy meal plan for weight management:  A healthy meal plan includes a variety of foods, contains fewer calories, and helps you stay healthy  A healthy meal plan includes the following:  · Eat whole-grain foods more often  A healthy meal plan should contain fiber  Fiber is the part of grains, fruits, and vegetables that is not broken down by your body  Whole-grain foods are healthy and provide extra fiber in your diet  Some examples of whole-grain foods are whole-wheat breads and pastas, oatmeal, brown rice, and bulgur  · Eat a variety of vegetables every day  Include dark, leafy greens such as spinach, kale, naa greens, and mustard greens  Eat yellow and orange vegetables such as carrots, sweet potatoes, and winter squash  · Eat a variety of fruits every day  Choose fresh or canned fruit (canned in its own juice or light syrup) instead of juice  Fruit juice has very little or no fiber  · Eat low-fat dairy foods  Drink fat-free (skim) milk or 1% milk  Eat fat-free yogurt and low-fat cottage cheese  Try low-fat cheeses such as mozzarella and other reduced-fat cheeses  · Choose meat and other protein foods that are low in fat  Choose beans or other legumes such as split peas or lentils  Choose fish, skinless poultry (chicken or turkey), or lean cuts of red meat (beef or pork)  Before you cook meat or poultry, cut off any visible fat  · Use less fat and oil  Try baking foods instead of frying them  Add less fat, such as margarine, sour cream, regular salad dressing and mayonnaise to foods   Eat fewer high-fat foods  Some examples of high-fat foods include french fries, doughnuts, ice cream, and cakes  · Eat fewer sweets  Limit foods and drinks that are high in sugar  This includes candy, cookies, regular soda, and sweetened drinks  Exercise:  Exercise at least 30 minutes per day on most days of the week  Some examples of exercise include walking, biking, dancing, and swimming  You can also fit in more physical activity by taking the stairs instead of the elevator or parking farther away from stores  Ask your healthcare provider about the best exercise plan for you  © Copyright [employer] 2018 Information is for End User's use only and may not be sold, redistributed or otherwise used for commercial purposes   All illustrations and images included in CareNotes® are the copyrighted property of [employer] , [employer]  or [address] [place] [address]

## 2022-02-15 ENCOUNTER — HOSPITAL ENCOUNTER (OUTPATIENT)
Dept: ULTRASOUND IMAGING | Facility: HOSPITAL | Age: 67
Discharge: HOME | End: 2022-02-15
Payer: MEDICARE

## 2022-02-15 DIAGNOSIS — E04.1 THYROID NODULE: ICD-10-CM

## 2022-02-15 PROCEDURE — 76536 US EXAM OF HEAD AND NECK: CPT

## 2022-02-18 ENCOUNTER — TELEPHONE (OUTPATIENT)
Dept: INTERNAL MEDICINE CLINIC | Facility: CLINIC | Age: 67
End: 2022-02-18

## 2022-02-21 NOTE — NURSING NOTE
Call placed to patient to discuss upcoming appointment at [facility] radiology department and complete consultation with patient  Patient is having thyroid biopsy utilizing US  guidance  Reviewed patient's allergies, no current anticoagulant medication present , also discussed the pre and post procedure expectations  Reminded patient of location and time expected for procedure  Patient very anxious regarding biopsy worried that she will need to swallow and not be able to during biopsy, attempted to reassure patient that we will work with her and she will be able to swallow when the needle is not obtaining a sample  Patient expressed understanding by verbalizing, repeating instructions and appreciation for reassurance and call

## 2022-03-01 ENCOUNTER — HOSPITAL ENCOUNTER (OUTPATIENT)
Dept: RADIOLOGY | Facility: HOSPITAL | Age: 67
Discharge: HOME | End: 2022-03-01
Admitting: RADIOLOGY
Payer: MEDICARE

## 2022-03-01 DIAGNOSIS — E04.1 THYROID NODULE: ICD-10-CM

## 2022-03-01 PROCEDURE — 88173 CYTOPATH EVAL FNA REPORT: CPT | Performed by: PATHOLOGY

## 2022-03-01 PROCEDURE — 88172 CYTP DX EVAL FNA 1ST EA SITE: CPT | Performed by: PATHOLOGY

## 2022-03-01 PROCEDURE — 10005 FNA BX W/US GDN 1ST LES: CPT

## 2022-03-01 RX ORDER — LIDOCAINE HYDROCHLORIDE 10 MG/ML
2 INJECTION, SOLUTION EPIDURAL; INFILTRATION; INTRACAUDAL; PERINEURAL ONCE
Status: COMPLETED | OUTPATIENT
Start: 2022-03-01 | End: 2022-03-01

## 2022-03-01 RX ADMIN — LIDOCAINE HYDROCHLORIDE 4 ML: 10 INJECTION, SOLUTION EPIDURAL; INFILTRATION; INTRACAUDAL; PERINEURAL at 11:07

## 2022-03-07 ENCOUNTER — CONSULT (OUTPATIENT)
Dept: PAIN MEDICINE | Facility: CLINIC | Age: 67
End: 2022-03-07
Payer: MEDICARE

## 2022-03-07 VITALS
HEART RATE: 76 BPM | DIASTOLIC BLOOD PRESSURE: 88 MMHG | SYSTOLIC BLOOD PRESSURE: 148 MMHG | WEIGHT: 150 LBS | BODY MASS INDEX: 26.57 KG/M2

## 2022-03-07 DIAGNOSIS — G58.9 PERIPHERAL NERVE ENTRAPMENT SYNDROME: Primary | ICD-10-CM

## 2022-03-07 PROCEDURE — 99204 OFFICE O/P NEW MOD 45 MIN: CPT | Performed by: ANESTHESIOLOGY

## 2022-03-07 NOTE — PATIENT INSTRUCTIONS

## 2022-03-07 NOTE — PROGRESS NOTES
Assessment  1  Peripheral nerve entrapment syndrome        Plan  The patient's symptoms, history/physical are consistent with pain as result of likely left cluneal nerve entrapment with pain over her left iliac bone posteriorly with radiation into the buttock  At this time, I discussed performing a left cluneal nerve injection to help reduce swelling inflammation which would be both diagnostic and therapeutic  She would like to proceed and will be scheduled for this Thursday under fluoroscopic guidance  Complete risks and benefits including bleeding, infection, tissue reaction, nerve injury and allergic reaction were discussed  The approach was demonstrated using models and literature was provided  Verbal and written consent was obtained  My impressions and treatment recommendations were discussed in detail with the patient who verbalized understanding and had no further questions  Discharge instructions were provided  I personally saw and examined the patient and I agree with the above discussed plan of care  Orders Placed This Encounter   Procedures    FL spine and pain procedure     Standing Status:   Future     Standing Expiration Date:   3/7/2026     Order Specific Question:   Reason for Exam:     Answer:   Left cluneal nerve injection (Iliac)     Order Specific Question:   Anticoagulant hold needed? Answer:   No     No orders of the defined types were placed in this encounter  History of Present Illness    Otoniel Duvall is a 74 y o  female referred by Dr Luca Lowe she is experiencing left lower back pain that has been present for about 8 months  She denies any precipitating injury or trauma  Symptoms are moderate rated 6/10 numeric rating scale and felt nearly constantly but worse at nighttime  Symptoms are sharp and pressure-like with pain in the left lower back that travels into the buttock and hip area  She has done physical therapy with minimal improvement    She did see   Aldair Monge as well  I have personally reviewed and/or updated the patient's past medical history, past surgical history, family history, social history, current medications, allergies, and vital signs today  Review of Systems   Constitutional: Negative for fever and unexpected weight change  HENT: Negative for trouble swallowing  Eyes: Negative for visual disturbance  Respiratory: Negative for shortness of breath and wheezing  Cardiovascular: Negative for chest pain and palpitations  Gastrointestinal: Negative for constipation, diarrhea, nausea and vomiting  Endocrine: Negative for cold intolerance, heat intolerance and polydipsia  Genitourinary: Negative for difficulty urinating and frequency  Musculoskeletal: Positive for gait problem and joint swelling  Negative for arthralgias and myalgias  Skin: Negative for rash  Neurological: Negative for dizziness, seizures, syncope, weakness and headaches  Hematological: Does not bruise/bleed easily  Psychiatric/Behavioral: Negative for dysphoric mood  All other systems reviewed and are negative        Patient Active Problem List   Diagnosis    Sleep disturbance    PROSPER (generalized anxiety disorder)    Atrophic vaginitis    Chronic left-sided low back pain without sciatica    Eustachian tube disorder, bilateral    Insomnia, controlled    MVP (mitral valve prolapse)    Nephrolithiasis    Osteopenia    Mixed hyperlipidemia    Thyroid nodule    Recurrent UTI    Renal cyst, acquired, left    Glaucoma suspect of both eyes       Past Medical History:   Diagnosis Date    Allergic     Anxiety     Chronic kidney disease     Hyperlipidemia     Recurrent UTI     Screening for AAA (abdominal aortic aneurysm) 5/3/2018    fam hx aaa- check screen- last one done 2016- ectatic at 2 7  check labs     Screening for cardiovascular condition 5/3/2018    Tonsillitis        Past Surgical History:   Procedure Laterality Date    CHOLECYSTECTOMY      CHOLECYSTECTOMY OPEN      COLONOSCOPY  2014    HYSTERECTOMY N/A     uterus removed age 25    HYSTERECTOMY      LEG SURGERY Left     OOPHORECTOMY      SALIVARY GLAND SURGERY Right     excision of parotid tumor/gland    SALPINGOOPHORECTOMY Bilateral     age 48   [facility] THYROID BIOPSY  3/1/2022       Family History   Problem Relation Age of Onset    Kidney cancer Mother     Skin cancer Mother     Kidney nephrosis Mother     Aneurysm Father         abdominal aortic aneurysm    Nephrolithiasis Father     Kidney nephrosis Father     Breast cancer Sister 47    Breast cancer Maternal Grandmother 72        approximate age   Elías Polio Breast cancer Maternal Aunt 76    Stomach cancer Paternal Uncle     Hypertension Maternal Grandfather     Diabetes Maternal Grandfather     Sudden death Maternal Grandfather         cardiac    Polycystic ovary syndrome Daughter     Diabetes Paternal Grandfather     Coronary artery disease Neg Hx     Stroke Neg Hx     Arthritis Neg Hx     Hyperlipidemia Neg Hx        Social History     Occupational History    Not on file   Tobacco Use    Smoking status: Never Smoker    Smokeless tobacco: Never Used   Vaping Use    Vaping Use: Never used   Substance and Sexual Activity    Alcohol use: No    Drug use: No    Sexual activity: Yes     Birth control/protection: Post-menopausal       Current Outpatient Medications on File Prior to Visit   Medication Sig    busPIRone (BUSPAR) 5 mg tablet Take 2 tablets (10 mg total) by mouth in the morning    cholecalciferol (VITAMIN D3) 1,000 units tablet Take 2,000 Units by mouth daily    diazepam (VALIUM) 5 mg tablet Take 1 tablet (5 mg total) by mouth daily as needed for anxiety No driving with this  No alcohol with this   Do not combine with zolpidem    estradiol (ESTRACE VAGINAL) 0 1 mg/g vaginal cream Insert 1 g into the vagina 2 (two) times a week    meloxicam (MOBIC) 15 mg tablet Take 1 tablet (15 mg total) by mouth daily    Multiple Vitamins-Minerals (CENTRUM SILVER ADULT 50+) TABS Take 1 tablet by mouth daily      Probiotic Product (PROBIOTIC COLON SUPPORT) CAPS Take 1 capsule by mouth daily      simvastatin (ZOCOR) 40 mg tablet Take 1 tablet (40 mg total) by mouth daily    Vaginal Lubricant (REPLENS) GEL Insert into the vagina    zolpidem (AMBIEN) 10 mg tablet Take 1 tablet (10 mg total) by mouth daily at bedtime as needed for sleep    fluconazole (DIFLUCAN) 100 mg tablet  (Patient not taking: Reported on 1/24/2022 )     No current facility-administered medications on file prior to visit  Allergies   Allergen Reactions    Augmentin [Amoxicillin-Pot Clavulanate] GI Intolerance    Macrobid [Nitrofurantoin] GI Intolerance       Physical Exam    /88   Pulse 76   Wt 68 kg (150 lb)   BMI 26 57 kg/m²     Constitutional: normal, well developed, well nourished, alert, in no distress and non-toxic and no overt pain behavior    Eyes: anicteric  HEENT: grossly intact  Neck: supple, symmetric, trachea midline and no masses   Pulmonary:even and unlabored  Cardiovascular:No edema or pitting edema present  Skin:Normal without rashes or lesions and well hydrated  Psychiatric:Mood and affect appropriate  Neurologic:Cranial Nerves II-XII grossly intact  Musculoskeletal:normal     Lumbar Spine Exam  Appearance:  Normal lordosis  Palpation/Tenderness:  Left lumbar tenderness over the left posterior iliac spine  Range of Motion:  Flexion:  Minimally limited  with pain  Extension:  Minimally limited  with pain  Motor Strength:  Left hip flexion:  5/5  Left hip extension:  5/5  Right hip flexion:  5/5  Right hip extension:  5/5  Left knee flexion:  5/5  Left knee extension:  5/5  Right knee flexion:  5/5  Right knee extension:  5/5  Left foot dorsiflexion:  5/5  Left foot plantar flexion:  5/5  Right foot dorsiflexion:  5/5  Right foot plantar flexion:  5/5  Reflexes:  Left Patellar:  2+   Right Patellar:  2+   Left Achilles:  2+   Right Achilles:  2+   Special Tests:  Left Straight Leg Test:  negative  Right Straight Leg Test:  negative   Left JOSE ANTONIO: negative  Right JOSE ANTONIO: negative    Imaging    XR LUMBAR SPINE (7/28/2021)     INDICATION:   M54 5: Low back pain      COMPARISON:  6/19/2017     VIEWS:  XR SPINE LUMBAR MINIMUM 4 VIEWS NON INJURY        FINDINGS:     There are 5 non rib bearing lumbar vertebral bodies       There is no evidence of acute fracture or destructive osseous lesion      Alignment is unremarkable       Unchanged mild degenerative disc disease at L5-S1 and moderate osteoarthritis of the lower lumbar facet joints      No abnormal motion on flexion/extension views      The pedicles appear intact      Soft tissues are unremarkable      IMPRESSION:     No acute osseous abnormality        Degenerative changes as described

## 2022-03-10 ENCOUNTER — HOSPITAL ENCOUNTER (OUTPATIENT)
Dept: RADIOLOGY | Facility: CLINIC | Age: 67
Discharge: HOME | End: 2022-03-10
Attending: ANESTHESIOLOGY | Admitting: ANESTHESIOLOGY
Payer: MEDICARE

## 2022-03-10 VITALS
DIASTOLIC BLOOD PRESSURE: 87 MMHG | TEMPERATURE: 97.9 F | SYSTOLIC BLOOD PRESSURE: 152 MMHG | HEART RATE: 71 BPM | RESPIRATION RATE: 20 BRPM | OXYGEN SATURATION: 97 %

## 2022-03-10 DIAGNOSIS — G58.9 PERIPHERAL NERVE ENTRAPMENT SYNDROME: ICD-10-CM

## 2022-03-10 PROCEDURE — 64450 NJX AA&/STRD OTHER PN/BRANCH: CPT | Performed by: ANESTHESIOLOGY

## 2022-03-10 PROCEDURE — 77002 NEEDLE LOCALIZATION BY XRAY: CPT | Performed by: ANESTHESIOLOGY

## 2022-03-10 PROCEDURE — 77002 NEEDLE LOCALIZATION BY XRAY: CPT

## 2022-03-10 RX ORDER — BUPIVACAINE HCL/PF 2.5 MG/ML
3 VIAL (ML) INJECTION ONCE
Status: COMPLETED | OUTPATIENT
Start: 2022-03-10 | End: 2022-03-10

## 2022-03-10 RX ORDER — 0.9 % SODIUM CHLORIDE 0.9 %
2 VIAL (ML) INJECTION ONCE
Status: COMPLETED | OUTPATIENT
Start: 2022-03-10 | End: 2022-03-10

## 2022-03-10 RX ORDER — METHYLPREDNISOLONE ACETATE 80 MG/ML
80 INJECTION, SUSPENSION INTRA-ARTICULAR; INTRALESIONAL; INTRAMUSCULAR; PARENTERAL; SOFT TISSUE ONCE
Status: COMPLETED | OUTPATIENT
Start: 2022-03-10 | End: 2022-03-10

## 2022-03-10 RX ADMIN — Medication 2 ML: at 09:29

## 2022-03-10 RX ADMIN — BUPIVACAINE HYDROCHLORIDE 3 ML: 2.5 INJECTION, SOLUTION EPIDURAL; INFILTRATION; INTRACAUDAL at 09:30

## 2022-03-10 RX ADMIN — SODIUM CHLORIDE 2 ML: 9 INJECTION INTRAMUSCULAR; INTRAVENOUS; SUBCUTANEOUS at 09:29

## 2022-03-10 RX ADMIN — METHYLPREDNISOLONE ACETATE 80 MG: 80 INJECTION, SUSPENSION INTRA-ARTICULAR; INTRALESIONAL; INTRAMUSCULAR; PARENTERAL; SOFT TISSUE at 09:30

## 2022-03-10 NOTE — DISCHARGE INSTR - LAB
Do not apply heat to any area that is numb  If you have discomfort or soreness at the injection site, you may apply ice today, 20 minutes on and 20 minutes off  Tomorrow you may use ice or warm, moist heat  Do not apply ice or heat directly to the skin  If you experience severe shortness of breath, go to the Emergency Room  You may have numbness for several hours from the local anesthetic  Please use caution and common sense, especially with weight-bearing activities  You may have an increase or change in the discomfort for 36-48 hours after your treatment  Apply ice and continue with any pain medicine you have been prescribed  Do not do anything strenuous today  You may shower, but no tub baths or hot tubs today  You may resume your normal activities tomorrow, but do not overdo it  Resume normal activities slowly when you are feeling better  If you experience redness, drainage or swelling at the injection site, or if you develop a fever above 100 degrees, please call [facility] at [phone] or go to the Emergency Room  Continue to take all routine medicines prescribed by your primary care physician unless otherwise instructed by our staff  Most blood thinners should be started again according to your regularly scheduled dosing  If you have any questions, please give our office a call  As no general anesthesia was used in today's procedure, you should not experience any side effects related to anesthesia  If you have a problem specifically related to your procedure, please call our office at [phone]  Problems not related to your procedure should be directed to your primary care physician

## 2022-03-10 NOTE — H&P
History of Present Illness: The patient is a 74 y o  female who presents with complaints of left lower back pain and is here today for left cluneal nerve injection    Patient Active Problem List   Diagnosis    Sleep disturbance    PROSPER (generalized anxiety disorder)    Atrophic vaginitis    Chronic left-sided low back pain without sciatica    Eustachian tube disorder, bilateral    Insomnia, controlled    MVP (mitral valve prolapse)    Nephrolithiasis    Osteopenia    Mixed hyperlipidemia    Thyroid nodule    Recurrent UTI    Renal cyst, acquired, left    Glaucoma suspect of both eyes       Past Medical History:   Diagnosis Date    Allergic     Anxiety     Chronic kidney disease     Hyperlipidemia     Recurrent UTI     Screening for AAA (abdominal aortic aneurysm) 5/3/2018    fam hx aaa- check screen- last one done 2016- ectatic at 2 7  check labs     Screening for cardiovascular condition 5/3/2018    Tonsillitis        Past Surgical History:   Procedure Laterality Date    CHOLECYSTECTOMY      CHOLECYSTECTOMY OPEN      COLONOSCOPY  2014    HYSTERECTOMY N/A     uterus removed age 25   [de-identified] HYSTERECTOMY      LEG SURGERY Left     OOPHORECTOMY      SALIVARY GLAND SURGERY Right     excision of parotid tumor/gland    SALPINGOOPHORECTOMY Bilateral     age 48   [facility] [address]  3/1/2022         Current Outpatient Medications:     busPIRone (BUSPAR) 5 mg tablet, Take 2 tablets (10 mg total) by mouth in the morning, Disp: 60 tablet, Rfl: 5    cholecalciferol (VITAMIN D3) 1,000 units tablet, Take 2,000 Units by mouth daily, Disp: , Rfl:     diazepam (VALIUM) 5 mg tablet, Take 1 tablet (5 mg total) by mouth daily as needed for anxiety No driving with this  No alcohol with this   Do not combine with zolpidem, Disp: 30 tablet, Rfl: 0    estradiol (ESTRACE VAGINAL) 0 1 mg/g vaginal cream, Insert 1 g into the vagina 2 (two) times a week, Disp: 42 5 g, Rfl: 3   fluconazole (DIFLUCAN) 100 mg tablet, , Disp: , Rfl:     meloxicam (MOBIC) 15 mg tablet, Take 1 tablet (15 mg total) by mouth daily, Disp: 30 tablet, Rfl: 0    Multiple Vitamins-Minerals (CENTRUM SILVER ADULT 50+) TABS, Take 1 tablet by mouth daily  , Disp: , Rfl:     Probiotic Product (PROBIOTIC COLON SUPPORT) CAPS, Take 1 capsule by mouth daily  , Disp: , Rfl:     simvastatin (ZOCOR) 40 mg tablet, Take 1 tablet (40 mg total) by mouth daily, Disp: 90 tablet, Rfl: 0    Vaginal Lubricant (REPLENS) GEL, Insert into the vagina, Disp: , Rfl:     zolpidem (AMBIEN) 10 mg tablet, Take 1 tablet (10 mg total) by mouth daily at bedtime as needed for sleep, Disp: 30 tablet, Rfl: 0    Current Facility-Administered Medications:     bupivacaine (PF) (MARCAINE) 0 25 % injection 3 mL, 3 mL, Intra-articular, Once, Dulce Maria Miramontes MD    lidocaine (PF) (XYLOCAINE-MPF) 2 % injection 2 mL, 2 mL, Infiltration, Once, Dulce Maria Miramontes MD    methylPREDNISolone acetate (DEPO-MEDROL) injection 80 mg, 80 mg, Intra-articular, Once, Dulce Maria Miramontes MD    sodium chloride (PF) 0 9 % injection 2 mL, 2 mL, Infiltration, Once, Dulce Maria Miramontes MD    Allergies   Allergen Reactions    Augmentin [Amoxicillin-Pot Clavulanate] GI Intolerance    Macrobid [Nitrofurantoin] GI Intolerance       Physical Exam:   Vitals:    03/10/22 0908   BP: 149/95   Pulse: 73   Resp: 20   Temp: 97 9 °F (36 6 °C)   SpO2: 96%     General: Awake, Alert, Oriented x 3, Mood and affect appropriate  Respiratory: Respirations even and unlabored  Cardiovascular: Peripheral pulses intact; no edema  Musculoskeletal Exam:  Left lower back pain    ASA Score: 2    Patient/Chart Verification  Patient ID Verified: Verbal  Consents Confirmed: To be obtained in the Pre-Procedure area  Interval H&P(within 24 hr) Complete (required for Outpatients and Surgery Admit only): To be obtained in the Pre-Procedure area  Allergies Reviewed:  Yes  Anticoag/NSAID held?: NA  Currently on antibiotics?: No    Assessment:   1   Peripheral nerve entrapment syndrome        Plan: Left cluneal nerve injection (Iliac)

## 2022-03-17 ENCOUNTER — TELEPHONE (OUTPATIENT)
Dept: PAIN MEDICINE | Facility: CLINIC | Age: 67
End: 2022-03-17

## 2022-03-17 NOTE — TELEPHONE ENCOUNTER
Pt reports 20% improvement post inj   Pain level 2-3/10   She said she doesn't wake up because of pain anymore  Pt aware I will call next week for an update

## 2022-03-21 DIAGNOSIS — E78.01 FAMILIAL HYPERCHOLESTEROLEMIA: ICD-10-CM

## 2022-03-21 RX ORDER — SIMVASTATIN 40 MG
40 TABLET ORAL DAILY
Qty: 90 TABLET | Refills: 0 | Status: SHIPPED | OUTPATIENT
Start: 2022-03-21 | End: 2022-07-08

## 2022-03-31 NOTE — TELEPHONE ENCOUNTER
Pt returned call in with 80 % improvement until today and pain level is a 2/10  Please be advised thank you    Pt can be reached @ [phone]

## 2022-04-08 NOTE — TELEPHONE ENCOUNTER
Patient states for the past 3 weeks felt great since 4/6 starting having increased pain       Please advise    Thank you

## 2022-04-08 NOTE — TELEPHONE ENCOUNTER
S/w pt, states pain is slowly starting to come back since injection  Pt states pain is in the same area as prior to injection, she would like to know if she should just monitor at this point or if any further recommendations  Please advise, thank you

## 2022-04-18 ENCOUNTER — TELEPHONE (OUTPATIENT)
Dept: INTERNAL MEDICINE CLINIC | Facility: CLINIC | Age: 67
End: 2022-04-18

## 2022-04-18 DIAGNOSIS — F41.9 ANXIETY: ICD-10-CM

## 2022-04-18 RX ORDER — BUSPIRONE HYDROCHLORIDE 5 MG/1
10 TABLET ORAL DAILY
Qty: 60 TABLET | Refills: 5
Start: 2022-04-18 | End: 2022-04-19 | Stop reason: SDUPTHER

## 2022-04-18 NOTE — TELEPHONE ENCOUNTER
Reyes patient:    Patient called in said her pharmacy sent a request for a medication refill  She reports that she discussed going back on medication (buspiron) with her PCP       Please advise

## 2022-04-19 ENCOUNTER — TELEPHONE (OUTPATIENT)
Dept: PAIN MEDICINE | Facility: CLINIC | Age: 67
End: 2022-04-19

## 2022-04-19 DIAGNOSIS — M47.816 LUMBAR SPONDYLOSIS: Primary | ICD-10-CM

## 2022-04-19 DIAGNOSIS — F41.9 ANXIETY: ICD-10-CM

## 2022-04-19 DIAGNOSIS — M46.1 SACROILIITIS (HCC): ICD-10-CM

## 2022-04-19 RX ORDER — BUSPIRONE HYDROCHLORIDE 5 MG/1
10 TABLET ORAL DAILY
Qty: 60 TABLET | Refills: 5 | Status: SHIPPED | OUTPATIENT
Start: 2022-04-19 | End: 2022-07-25

## 2022-04-19 RX ORDER — BUSPIRONE HYDROCHLORIDE 5 MG/1
10 TABLET ORAL DAILY
Qty: 60 TABLET | Refills: 5 | Status: CANCELLED | OUTPATIENT
Start: 2022-04-19

## 2022-04-19 NOTE — TELEPHONE ENCOUNTER
Please let patient know that I have ordered MRI l-spine as per [facility] message   Will call with results

## 2022-04-19 NOTE — TELEPHONE ENCOUNTER
----- Message from Susy Gonzales RN sent at 4/15/2022  1:01 PM EDT -----  Regarding: FW: Question    ----- Message -----  From: Merna Stanley  Sent: 4/15/2022  12:23 PM EDT  To: [facility]  Subject: Question                                         Hi dr Cyrus Ryan  The injection you gave me lasted 3 weeks  It was great but pain has come back after the 3 weeks  Do you think I should have a ct scan or mri to further help in pinpoint it? Please advise  Thank you  Davin GUERRERO   1955   Cell

## 2022-04-21 ENCOUNTER — HOSPITAL ENCOUNTER (OUTPATIENT)
Dept: MRI IMAGING | Facility: HOSPITAL | Age: 67
Discharge: HOME | End: 2022-04-21
Attending: ANESTHESIOLOGY
Payer: MEDICARE

## 2022-04-21 DIAGNOSIS — M46.1 SACROILIITIS (HCC): ICD-10-CM

## 2022-04-21 DIAGNOSIS — M47.816 LUMBAR SPONDYLOSIS: ICD-10-CM

## 2022-04-21 PROCEDURE — 72148 MRI LUMBAR SPINE W/O DYE: CPT

## 2022-04-21 PROCEDURE — G1004 CDSM NDSC: HCPCS

## 2022-04-25 ENCOUNTER — TELEPHONE (OUTPATIENT)
Dept: PAIN MEDICINE | Facility: CLINIC | Age: 67
End: 2022-04-25

## 2022-04-25 DIAGNOSIS — M47.816 LUMBAR SPONDYLOSIS: Primary | ICD-10-CM

## 2022-04-25 NOTE — TELEPHONE ENCOUNTER
Please let patient know that the MRI lumbar spine shows primarily arthritis in the lower back at L4-5 and L5-S1  Please get update on current symptoms  Likely I will recommend proceeding with left L3-5 medial branch blocks in anticipation of radiofrequency ablation

## 2022-04-25 NOTE — TELEPHONE ENCOUNTER
Pt informed of lumbar MRI results as per task  Pt's current symptoms are the same as when she came and saw FQ  She has pain of her left waist, hip and buttock  Pain inc with walking, using treadmill and doing activities  She reports that the inj helped for 3 wks  Since the inj the pain she would experience during the night when she would have to get up has resolved  Explained diagnostic MBB and RFA procedures  Pt would be interested in proceeding if indicated  Pt said for the first time in 3 yrs she is going on vacation for 6 days on 6/1/22  She is not sure if she would like to at least get the MBB before her trip or wait and schedule everything after she returns  She will make a decision by the time Maxie Phalen calls her to schedule if  orders procedures to be done

## 2022-04-26 NOTE — TELEPHONE ENCOUNTER
S/w pt, answered questions pertaining to MBB / RFA  Pt verbalized understanding and appreciative of cb  Pt asked if MBB appt would be changed to an afternoon spot as her  has an appt at the same time on that date  Please reach out to r/s  Thank you

## 2022-04-26 NOTE — TELEPHONE ENCOUNTER
Please call pt back   Pt has some questions that she needs to be answered about procedure going forward    Pt # [phone]

## 2022-06-09 ENCOUNTER — HOSPITAL ENCOUNTER (OUTPATIENT)
Dept: RADIOLOGY | Facility: CLINIC | Age: 67
Discharge: HOME | End: 2022-06-09
Attending: ANESTHESIOLOGY | Admitting: ANESTHESIOLOGY
Payer: MEDICARE

## 2022-06-09 VITALS
OXYGEN SATURATION: 97 % | SYSTOLIC BLOOD PRESSURE: 158 MMHG | DIASTOLIC BLOOD PRESSURE: 85 MMHG | RESPIRATION RATE: 18 BRPM | TEMPERATURE: 98.4 F | HEART RATE: 67 BPM

## 2022-06-09 DIAGNOSIS — M47.816 LUMBAR SPONDYLOSIS: ICD-10-CM

## 2022-06-09 PROCEDURE — 64494 INJ PARAVERT F JNT L/S 2 LEV: CPT | Performed by: ANESTHESIOLOGY

## 2022-06-09 PROCEDURE — 64493 INJ PARAVERT F JNT L/S 1 LEV: CPT | Performed by: ANESTHESIOLOGY

## 2022-06-09 RX ORDER — BUPIVACAINE HCL/PF 2.5 MG/ML
10 VIAL (ML) INJECTION ONCE
Status: COMPLETED | OUTPATIENT
Start: 2022-06-09 | End: 2022-06-09

## 2022-06-09 RX ADMIN — BUPIVACAINE HYDROCHLORIDE 1.5 ML: 2.5 INJECTION, SOLUTION EPIDURAL; INFILTRATION; INTRACAUDAL at 13:11

## 2022-06-09 NOTE — DISCHARGE INSTR - LAB

## 2022-06-09 NOTE — H&P
History of Present Illness: The patient is a 74 y o  female who presents with complaints of left lower back pain and is here today for diagnostic left L3-5 medial branch blocks    Patient Active Problem List   Diagnosis    Sleep disturbance    PROSPER (generalized anxiety disorder)    Atrophic vaginitis    Chronic left-sided low back pain without sciatica    Eustachian tube disorder, bilateral    Insomnia, controlled    MVP (mitral valve prolapse)    Nephrolithiasis    Osteopenia    Mixed hyperlipidemia    Thyroid nodule    Recurrent UTI    Renal cyst, acquired, left    Glaucoma suspect of both eyes    Peripheral nerve entrapment syndrome       Past Medical History:   Diagnosis Date    Allergic     Anxiety     Chronic kidney disease     Hyperlipidemia     Recurrent UTI     Screening for AAA (abdominal aortic aneurysm) 5/3/2018    fam hx aaa- check screen- last one done 2016- ectatic at 2 7  check labs     Screening for cardiovascular condition 5/3/2018    Tonsillitis        Past Surgical History:   Procedure Laterality Date    CHOLECYSTECTOMY      CHOLECYSTECTOMY OPEN      COLONOSCOPY  2014    HYSTERECTOMY N/A     uterus removed age 25   [facility] HYSTERECTOMY      LEG SURGERY Left     OOPHORECTOMY      SALIVARY GLAND SURGERY Right     excision of parotid tumor/gland    SALPINGOOPHORECTOMY Bilateral     age 48   [facility]  3/1/2022         Current Outpatient Medications:     busPIRone (BUSPAR) 5 mg tablet, Take 2 tablets (10 mg total) by mouth in the morning, Disp: 60 tablet, Rfl: 5    cholecalciferol (VITAMIN D3) 1,000 units tablet, Take 2,000 Units by mouth daily, Disp: , Rfl:     diazepam (VALIUM) 5 mg tablet, Take 1 tablet (5 mg total) by mouth daily as needed for anxiety No driving with this  No alcohol with this   Do not combine with zolpidem, Disp: 30 tablet, Rfl: 0    estradiol (ESTRACE VAGINAL) 0 1 mg/g vaginal cream, Insert 1 g into the vagina 2 (two) times a week, Disp: 42 5 g, Rfl: 3    fluconazole (DIFLUCAN) 100 mg tablet, , Disp: , Rfl:     meloxicam (MOBIC) 15 mg tablet, Take 1 tablet (15 mg total) by mouth daily, Disp: 30 tablet, Rfl: 0    Multiple Vitamins-Minerals (CENTRUM SILVER ADULT 50+) TABS, Take 1 tablet by mouth daily  , Disp: , Rfl:     Probiotic Product (PROBIOTIC COLON SUPPORT) CAPS, Take 1 capsule by mouth daily  , Disp: , Rfl:     simvastatin (ZOCOR) 40 mg tablet, Take 1 tablet (40 mg total) by mouth daily, Disp: 90 tablet, Rfl: 0    Vaginal Lubricant (REPLENS) GEL, Insert into the vagina, Disp: , Rfl:     zolpidem (AMBIEN) 10 mg tablet, Take 1 tablet (10 mg total) by mouth daily at bedtime as needed for sleep, Disp: 30 tablet, Rfl: 0    Current Facility-Administered Medications:     bupivacaine (PF) (MARCAINE) 0 25 % injection 10 mL, 10 mL, Perineural, Once, Fuad Rehman MD    Allergies   Allergen Reactions    Augmentin [Amoxicillin-Pot Clavulanate] GI Intolerance    Macrobid [Nitrofurantoin] GI Intolerance       Physical Exam:   Vitals:    06/09/22 1300   BP: 153/90   Pulse: 73   Resp: 20   Temp: 98 4 °F (36 9 °C)   SpO2: 95%     General: Awake, Alert, Oriented x 3, Mood and affect appropriate  Respiratory: Respirations even and unlabored  Cardiovascular: Peripheral pulses intact; no edema  Musculoskeletal Exam:  Left lower back pain    ASA Score: 3    Patient/Chart Verification  Patient ID Verified: Verbal  Consents Confirmed: To be obtained in the Pre-Procedure area  Interval H&P(within 24 hr) Complete (required for Outpatients and Surgery Admit only): To be obtained in the Pre-Procedure area  Allergies Reviewed: Yes  Anticoag/NSAID held?: NA  Currently on antibiotics?: No    Assessment:   1   Lumbar spondylosis        Plan: Left L3-5 MBB

## 2022-06-10 ENCOUNTER — OFFICE VISIT (OUTPATIENT)
Dept: URGENT CARE | Facility: MEDICAL CENTER | Age: 67
End: 2022-06-10
Payer: MEDICARE

## 2022-06-10 VITALS
OXYGEN SATURATION: 99 % | HEIGHT: 63 IN | BODY MASS INDEX: 26.4 KG/M2 | HEART RATE: 96 BPM | WEIGHT: 149 LBS | TEMPERATURE: 99.9 F | RESPIRATION RATE: 18 BRPM

## 2022-06-10 DIAGNOSIS — R68.89 FLU-LIKE SYMPTOMS: Primary | ICD-10-CM

## 2022-06-10 DIAGNOSIS — R05.1 ACUTE COUGH: ICD-10-CM

## 2022-06-10 DIAGNOSIS — K12.0 APHTHOUS ULCER: ICD-10-CM

## 2022-06-10 PROCEDURE — 99213 OFFICE O/P EST LOW 20 MIN: CPT | Performed by: PHYSICIAN ASSISTANT

## 2022-06-10 PROCEDURE — 87636 SARSCOV2 & INF A&B AMP PRB: CPT | Performed by: PHYSICIAN ASSISTANT

## 2022-06-10 PROCEDURE — G0463 HOSPITAL OUTPT CLINIC VISIT: HCPCS | Performed by: PHYSICIAN ASSISTANT

## 2022-06-10 RX ORDER — BENZONATATE 100 MG/1
100 CAPSULE ORAL 3 TIMES DAILY PRN
Qty: 20 CAPSULE | Refills: 0 | Status: SHIPPED | OUTPATIENT
Start: 2022-06-10 | End: 2022-07-21 | Stop reason: ALTCHOICE

## 2022-06-10 NOTE — PATIENT INSTRUCTIONS
Flu-like symptoms   COVID test sent  Follow up with PCP in 3-5 days  Proceed to  ER if symptoms worsen  [address]    Your healthcare provider and/or public health staff have evaluated you and have determined that you do not need to remain in the hospital at this time  At this time you can be isolated at home where you will be monitored by staff from your local or state health department  You should carefully follow the prevention and isolation steps below until a healthcare provider or local or state health department says that you can return to your normal activities  Stay home except to get medical care    People who are mildly ill with COVID-19 are able to isolate at home during their illness  You should restrict activities outside your home, except for getting medical care  Do not go to work, school, or public areas  Avoid using public transportation, ride-sharing, or taxis  Separate yourself from other people and animals in your home    People: As much as possible, you should stay in a specific room and away from other people in your home  Also, you should use a separate bathroom, if available  Animals: You should restrict contact with pets and other animals while you are sick with COVID-19, just like you would around other people  Although there have not been reports of pets or other animals becoming sick with COVID-19, it is still recommended that people sick with COVID-19 limit contact with animals until more information is known about the virus  When possible, have another member of your household care for your animals while you are sick  If you are sick with COVID-19, avoid contact with your pet, including petting, snuggling, being kissed or licked, and sharing food  If you must care for your pet or be around animals while you are sick, wash your hands before and after you interact with pets and wear a facemask  See COVID-19 and Animals for more information      Call ahead before visiting your doctor    If you have a medical appointment, call the healthcare provider and tell them that you have or may have COVID-19  This will help the healthcare providers office take steps to keep other people from getting infected or exposed  Wear a facemask    You should wear a facemask when you are around other people (e g , sharing a room or vehicle) or pets and before you enter a healthcare providers office  If you are not able to wear a facemask (for example, because it causes trouble breathing), then people who live with you should not stay in the same room with you, or they should wear a facemask if they enter your room  Cover your coughs and sneezes    Cover your mouth and nose with a tissue when you cough or sneeze  Throw used tissues in a lined trash can  Immediately wash your hands with soap and water for at least 20 seconds or, if soap and water are not available, clean your hands with an alcohol-based hand  that contains at least 60% alcohol  Clean your hands often    Wash your hands often with soap and water for at least 20 seconds, especially after blowing your nose, coughing, or sneezing; going to the bathroom; and before eating or preparing food  If soap and water are not readily available, use an alcohol-based hand  with at least 60% alcohol, covering all surfaces of your hands and rubbing them together until they feel dry  Soap and water are the best option if hands are visibly dirty  Avoid touching your eyes, nose, and mouth with unwashed hands  Avoid sharing personal household items    You should not share dishes, drinking glasses, cups, eating utensils, towels, or bedding with other people or pets in your home  After using these items, they should be washed thoroughly with soap and water      Clean all high-touch surfaces everyday    High touch surfaces include counters, tabletops, doorknobs, bathroom fixtures, toilets, phones, keyboards, tablets, and bedside tables  Also, clean any surfaces that may have blood, stool, or body fluids on them  Use a household cleaning spray or wipe, according to the label instructions  Labels contain instructions for safe and effective use of the cleaning product including precautions you should take when applying the product, such as wearing gloves and making sure you have good ventilation during use of the product  Monitor your symptoms    Seek prompt medical attention if your illness is worsening (e g , difficulty breathing)  Before seeking care, call your healthcare provider and tell them that you have, or are being evaluated for, COVID-19  Put on a facemask before you enter the facility  These steps will help the healthcare providers office to keep other people in the office or waiting room from getting infected or exposed  Ask your healthcare provider to call the local or [facility] health department  Persons who are placed under active monitoring or facilitated self-monitoring should follow instructions provided by their local health department or occupational health professionals, as appropriate  If you have a medical emergency and need to call 911, notify the dispatch personnel that you have, or are being evaluated for COVID-19  If possible, put on a facemask before emergency medical services arrive  Discontinuing home isolation    Patients with confirmed COVID-19 should remain under home isolation precautions until the following conditions are met:    They have had no fever for at least 24 hours (that is one full day of no fever without the use medicine that reduces fevers)  AND  other symptoms have improved (for example, when their cough or shortness of breath have improved)  AND  If had mild or moderate illness, at least 10 days have passed since their symptoms first appeared or if severe illness (needed oxygen) or immunosuppressed, at least 20 days have passed since symptoms first appeared  Patients with confirmed COVID-19 should also notify close contacts (including their workplace) and ask that they self-quarantine  Currently, close contact is defined as being within 6 feet for 15 minutes or more from the period 24 hours starting 48 hours before symptom onset to the time at which the patient went into isolation  Close contacts of patients diagnosed with COVID-19 should be instructed by the patient to self-quarantine for 14 days from the last time of their last contact with the patient       Source: [employer]

## 2022-06-10 NOTE — PROGRESS NOTES
[address] [facility]        NAME: Milton Geronimo is a 74 y o  female  : 1955    MRN: [id number]  DATE: Mira 10, 2022  TIME: 7:06 PM    Assessment and Plan   Flu-like symptoms [R68 89]  1  Flu-like symptoms           Patient Instructions     Flu-like symptoms   COVID test sent  Follow up with PCP in 3-5 days  Proceed to  ER if symptoms worsen  Chief Complaint     Chief Complaint   Patient presents with    Cold Like Symptoms     Pt  With cough, congestion, and fever that began this am  T-max 010 6         History of Present Illness       53-year-old female who presents complaining of cough, fevers T-max 100 4°, body aches x1 day  Patient states that she  tested 4 days ago for COVID-19 due to travel which was negative  Denies chest pain, shortness off breath      Review of Systems   Review of Systems   Constitutional: Positive for fever  Negative for activity change, appetite change, chills, diaphoresis and fatigue  HENT: Positive for congestion, ear pain and rhinorrhea  Negative for ear discharge, facial swelling, hearing loss, mouth sores, nosebleeds, postnasal drip, sinus pressure, sinus pain, sneezing, sore throat and voice change  Respiratory: Positive for cough  Negative for apnea, choking, chest tightness, shortness of breath, wheezing and stridor  Cardiovascular: Negative  Current Medications       Current Outpatient Medications:     busPIRone (BUSPAR) 5 mg tablet, Take 2 tablets (10 mg total) by mouth in the morning, Disp: 60 tablet, Rfl: 5    cholecalciferol (VITAMIN D3) 1,000 units tablet, Take 2,000 Units by mouth daily, Disp: , Rfl:     diazepam (VALIUM) 5 mg tablet, Take 1 tablet (5 mg total) by mouth daily as needed for anxiety No driving with this  No alcohol with this   Do not combine with zolpidem, Disp: 30 tablet, Rfl: 0    estradiol (ESTRACE VAGINAL) 0 1 mg/g vaginal cream, Insert 1 g into the vagina 2 (two) times a week, Disp: 42 5 g, Rfl: 3    Multiple Vitamins-Minerals (CENTRUM SILVER ADULT 50+) TABS, Take 1 tablet by mouth daily  , Disp: , Rfl:     Probiotic Product (PROBIOTIC COLON SUPPORT) CAPS, Take 1 capsule by mouth daily  , Disp: , Rfl:     simvastatin (ZOCOR) 40 mg tablet, Take 1 tablet (40 mg total) by mouth daily, Disp: 90 tablet, Rfl: 0    Vaginal Lubricant (REPLENS) GEL, Insert into the vagina, Disp: , Rfl:     zolpidem (AMBIEN) 10 mg tablet, Take 1 tablet (10 mg total) by mouth daily at bedtime as needed for sleep, Disp: 30 tablet, Rfl: 0    fluconazole (DIFLUCAN) 100 mg tablet, , Disp: , Rfl:     meloxicam (MOBIC) 15 mg tablet, Take 1 tablet (15 mg total) by mouth daily (Patient not taking: Reported on 6/10/2022), Disp: 30 tablet, Rfl: 0    Current Allergies     Allergies as of 06/10/2022 - Reviewed 06/10/2022   Allergen Reaction Noted    Augmentin [amoxicillin-pot clavulanate] GI Intolerance 05/04/2016    Macrobid [nitrofurantoin] GI Intolerance 05/04/2016            The following portions of the patient's history were reviewed and updated as appropriate: allergies, current medications, past family history, past medical history, past social history, past surgical history and problem list      Past Medical History:   Diagnosis Date    Allergic     Anxiety     Chronic kidney disease     Hyperlipidemia     Recurrent UTI     Screening for AAA (abdominal aortic aneurysm) 5/3/2018    fam hx aaa- check screen- last one done 2016- ectatic at 2 7  check labs     Screening for cardiovascular condition 5/3/2018    Tonsillitis        Past Surgical History:   Procedure Laterality Date    CHOLECYSTECTOMY      CHOLECYSTECTOMY OPEN      COLONOSCOPY  2014    HYSTERECTOMY N/A     uterus removed age 25   [place] HYSTERECTOMY      LEG SURGERY Left     OOPHORECTOMY      SALIVARY GLAND SURGERY Right     excision of parotid tumor/gland    SALPINGOOPHORECTOMY Bilateral     age 48   [address]  3/1/2022       Family History   Problem Relation Age of Onset    Kidney cancer Mother     Skin cancer Mother     Kidney nephrosis Mother     Aneurysm Father         abdominal aortic aneurysm    Nephrolithiasis Father     Kidney nephrosis Father     Breast cancer Sister 47    Breast cancer Maternal Grandmother 72        approximate age   Valaria Reil Breast cancer Maternal Aunt 76    Stomach cancer Paternal Uncle     Hypertension Maternal Grandfather     Diabetes Maternal Grandfather     Sudden death Maternal Grandfather         cardiac    Polycystic ovary syndrome Daughter     Diabetes Paternal Grandfather     Coronary artery disease Neg Hx     Stroke Neg Hx     Arthritis Neg Hx     Hyperlipidemia Neg Hx          Medications have been verified  Objective   Pulse 96   Temp 99 9 °F (37 7 °C)   Resp 18   Ht 5' 3" (1 6 m)   Wt 67 6 kg (149 lb)   SpO2 99%   BMI 26 39 kg/m²        Physical Exam     Physical Exam  Constitutional:       General: She is not in acute distress  Appearance: Normal appearance  She is well-developed  She is not diaphoretic  HENT:      Head: Normocephalic and atraumatic  Right Ear: Hearing, tympanic membrane, ear canal and external ear normal       Left Ear: Hearing, tympanic membrane, ear canal and external ear normal       Nose: Rhinorrhea present  Mouth/Throat:      Pharynx: Uvula midline  Cardiovascular:      Rate and Rhythm: Normal rate and regular rhythm  Heart sounds: Normal heart sounds  Pulmonary:      Effort: Pulmonary effort is normal  No respiratory distress  Breath sounds: Normal breath sounds  No stridor  No wheezing, rhonchi or rales  Chest:      Chest wall: No tenderness  Musculoskeletal:      Cervical back: Normal range of motion and neck supple  Lymphadenopathy:      Cervical: Cervical adenopathy present  Neurological:      Mental Status: She is alert

## 2022-06-11 ENCOUNTER — HOSPITAL ENCOUNTER (EMERGENCY)
Facility: HOSPITAL | Age: 67
Discharge: HOME | End: 2022-06-11
Attending: EMERGENCY MEDICINE | Admitting: EMERGENCY MEDICINE
Payer: MEDICARE

## 2022-06-11 VITALS
HEART RATE: 88 BPM | OXYGEN SATURATION: 96 % | RESPIRATION RATE: 17 BRPM | SYSTOLIC BLOOD PRESSURE: 174 MMHG | BODY MASS INDEX: 26.22 KG/M2 | DIASTOLIC BLOOD PRESSURE: 93 MMHG | WEIGHT: 148 LBS | TEMPERATURE: 98.5 F

## 2022-06-11 DIAGNOSIS — U07.1 COVID: Primary | ICD-10-CM

## 2022-06-11 LAB
FLUAV RNA RESP QL NAA+PROBE: NEGATIVE
FLUBV RNA RESP QL NAA+PROBE: NEGATIVE
SARS-COV-2 RNA RESP QL NAA+PROBE: POSITIVE

## 2022-06-11 PROCEDURE — 99284 EMERGENCY DEPT VISIT MOD MDM: CPT | Performed by: PHYSICIAN ASSISTANT

## 2022-06-11 PROCEDURE — 99283 EMERGENCY DEPT VISIT LOW MDM: CPT

## 2022-06-11 RX ORDER — HYDROCODONE POLISTIREX AND CHLORPHENIRAMINE POLISTIREX 10; 8 MG/5ML; MG/5ML
5 SUSPENSION, EXTENDED RELEASE ORAL EVERY 12 HOURS PRN
Qty: 120 ML | Refills: 0 | Status: SHIPPED | OUTPATIENT
Start: 2022-06-11 | End: 2022-06-21

## 2022-06-11 RX ORDER — ALBUTEROL SULFATE 90 UG/1
2 AEROSOL, METERED RESPIRATORY (INHALATION) EVERY 4 HOURS PRN
Qty: 18 G | Refills: 0 | Status: SHIPPED | OUTPATIENT
Start: 2022-06-11 | End: 2022-07-21 | Stop reason: ALTCHOICE

## 2022-06-11 NOTE — ED PROVIDER NOTES
History  Chief Complaint   Patient presents with    Fever - 9 weeks to 74 years     Fever cough, home test positive for covid     58-year-old female presents the emergency department with reports of a positive COVID test   States she has had symptoms of a sore throat with cough and nasal congestion since yesterday  Additionally reports fever up to 100 6 at home  States she has been fully vaccinated and boosted for COVID  Notes that she her  tested negative [place] the airport prior to returning from [facility]  States that she was seen at urgent care yesterday and that her test results today  States that she was directed to the emergency department to start medications for COVID  History provided by:  Patient   used: No    Fever - 9 weeks to 74 years  Max temp prior to arrival:  100 6  Severity:  Moderate  Onset quality:  Gradual  Duration:  1 day  Timing:  Intermittent  Progression:  Waxing and waning  Chronicity:  New  Relieved by:  Acetaminophen  Associated symptoms: chills, congestion and cough    Associated symptoms: no chest pain, no confusion, no diarrhea, no dysuria, no ear pain, no headaches, no myalgias, no nausea, no rash, no rhinorrhea, no somnolence, no sore throat and no vomiting        Prior to Admission Medications   Prescriptions Last Dose Informant Patient Reported? Taking?    Multiple Vitamins-Minerals (CENTRUM SILVER ADULT 50+) TABS  Self Yes No   Sig: Take 1 tablet by mouth daily     Probiotic Product (PROBIOTIC COLON SUPPORT) CAPS  Self Yes No   Sig: Take 1 capsule by mouth daily     Vaginal Lubricant (REPLENS) GEL  Self Yes No   Sig: Insert into the vagina   al mag oxide-diphenhydramine-lidocaine viscous (MAGIC MOUTHWASH) 1:1:1 suspension   No No   Sig: Swish and spit 10 mL every 4 (four) hours as needed for mouth pain or discomfort for up to 5 days   benzonatate (TESSALON PERLES) 100 mg capsule   No No   Sig: Take 1 capsule (100 mg total) by mouth 3 (three) times a day as needed for cough   busPIRone (BUSPAR) 5 mg tablet   No No   Sig: Take 2 tablets (10 mg total) by mouth in the morning   cholecalciferol (VITAMIN D3) 1,000 units tablet  Self Yes No   Sig: Take 2,000 Units by mouth daily   diazepam (VALIUM) 5 mg tablet   No No   Sig: Take 1 tablet (5 mg total) by mouth daily as needed for anxiety No driving with this  No alcohol with this   Do not combine with zolpidem   estradiol (ESTRACE VAGINAL) 0 1 mg/g vaginal cream  Self No No   Sig: Insert 1 g into the vagina 2 (two) times a week   simvastatin (ZOCOR) 40 mg tablet   No No   Sig: Take 1 tablet (40 mg total) by mouth daily   zolpidem (AMBIEN) 10 mg tablet   No No   Sig: Take 1 tablet (10 mg total) by mouth daily at bedtime as needed for sleep      Facility-Administered Medications: None       Past Medical History:   Diagnosis Date    Allergic     Anxiety     Chronic kidney disease     Hyperlipidemia     Recurrent UTI     Screening for AAA (abdominal aortic aneurysm) 5/3/2018    [facility] hx aaa- check screen- last one done 2016- ectatic at 2 7  check labs     Screening for cardiovascular condition 5/3/2018    Tonsillitis        Past Surgical History:   Procedure Laterality Date    CHOLECYSTECTOMY      CHOLECYSTECTOMY OPEN      COLONOSCOPY  2014    HYSTERECTOMY N/A     uterus removed age 25   [address]     OOPHORECTOMY      SALIVARY GLAND SURGERY Right     excision of parotid tumor/gland    SALPINGOOPHORECTOMY Bilateral     age 48   [address]  3/1/2022       Family History   Problem Relation Age of Onset    Kidney cancer Mother     Skin cancer Mother     Kidney nephrosis Mother     Aneurysm Father         abdominal aortic aneurysm    Nephrolithiasis Father     Kidney nephrosis Father     Breast cancer Sister 47    Breast cancer Maternal Grandmother 72        approximate age   Storey Hema Breast cancer Maternal Aunt 76    Stomach cancer Paternal Uncle     Hypertension Maternal Grandfather     Diabetes Maternal Grandfather     Sudden death Maternal Grandfather         cardiac    Polycystic ovary syndrome Daughter     Diabetes Paternal Grandfather     Coronary artery disease Neg Hx     Stroke Neg Hx     Arthritis Neg Hx     Hyperlipidemia Neg Hx      I have reviewed and agree with the history as documented  E-Cigarette/Vaping    E-Cigarette Use Never User      E-Cigarette/Vaping Substances    Nicotine No     THC No     CBD No     Flavoring No     Other No     Unknown No      Social History     Tobacco Use    Smoking status: Never Smoker    Smokeless tobacco: Never Used   Vaping Use    Vaping Use: Never used   Substance Use Topics    Alcohol use: No    Drug use: No       Review of Systems   Constitutional: Positive for chills and fever  Negative for activity change and appetite change  HENT: Positive for congestion  Negative for dental problem, drooling, ear discharge, ear pain, mouth sores, nosebleeds, rhinorrhea, sore throat and trouble swallowing  Eyes: Negative for pain, discharge and itching  Respiratory: Positive for cough  Negative for chest tightness, shortness of breath and wheezing  Cardiovascular: Negative for chest pain and palpitations  Gastrointestinal: Negative for abdominal pain, blood in stool, constipation, diarrhea, nausea and vomiting  Endocrine: Negative for cold intolerance and heat intolerance  Genitourinary: Negative for difficulty urinating, dysuria, flank pain, frequency and urgency  Musculoskeletal: Negative for myalgias  Skin: Negative for rash and wound  Allergic/Immunologic: Negative for food allergies and immunocompromised state  Neurological: Negative for dizziness, seizures, syncope, weakness, numbness and headaches  Psychiatric/Behavioral: Negative for agitation, behavioral problems and confusion  Physical Exam  Physical Exam  Vitals and nursing note reviewed  Constitutional:       Appearance: She is well-developed  HENT:      Head: Normocephalic and atraumatic  Cardiovascular:      Rate and Rhythm: Normal rate and regular rhythm  Pulmonary:      Effort: Pulmonary effort is normal  No respiratory distress  Breath sounds: No wheezing, rhonchi or rales  Skin:     General: Skin is warm and dry  Neurological:      Mental Status: She is alert and oriented to person, place, and time  Psychiatric:         Mood and Affect: Mood normal          Behavior: Behavior normal          Vital Signs  ED Triage Vitals [06/11/22 1307]   Temperature Pulse Respirations Blood Pressure SpO2   98 5 °F (36 9 °C) 88 17 (!) 174/93 96 %      Temp Source Heart Rate Source Patient Position - Orthostatic VS BP Location FiO2 (%)   Oral Monitor Sitting Right arm --      Pain Score       --           Vitals:    06/11/22 1307   BP: (!) 174/93   Pulse: 88   Patient Position - Orthostatic VS: Sitting         Visual Acuity      ED Medications  Medications - No data to display    Diagnostic Studies  Results Reviewed     None                 No orders to display              Procedures  Procedures         ED Course                               SBIRT 22yo+    Flowsheet Row Most Recent Value   SBIRT (25 yo +)    In order to provide better care to our patients, we are screening all of our patients for alcohol and drug use  Would it be okay to ask you these screening questions? Unable to answer at this time Filed at: 06/11/2022 1518                    MDM  Number of Diagnoses or Management Options  COVID  Diagnosis management comments: Differential diagnosis includes but not limited to:  COVID      Discussed risks and benefits of treatment with Paxil elevated  Ultimately decided to pursue better cough suppressant in  Did fax prescription for Paxil bit to pharmacy as well as planting information for patient regarding Paxil it and medication interactions    Patient has  been given until 6/14/22 to fill prescription as this would be 5 days from her onset of symptoms  Amount and/or Complexity of Data Reviewed  Clinical lab tests: reviewed        Disposition  Final diagnoses:   COVID     Time reflects when diagnosis was documented in both MDM as applicable and the Disposition within this note     Time User Action Codes Description Comment    6/11/2022  3:08 PM Mo Acuña 26 [U07 1] Prieto       ED Disposition     ED Disposition   Discharge    Condition   Stable    Date/Time   Sat Jun 11, 2022  3:05 PM    Comment   Felix Piedra discharge to home/self care                 Follow-up Information     Follow up With Specialties Details Why Lacie Momin MD Internal Medicine   [address]  [address]  [phone]            Discharge Medication List as of 6/11/2022  3:19 PM      START taking these medications    Details   albuterol (ProAir HFA) 90 mcg/act inhaler Inhale 2 puffs every 4 (four) hours as needed for wheezing, Starting Sat 6/11/2022, Normal      hydrocodone-chlorpheniramine polistirex (TUSSIONEX) 10-8 mg/5 mL ER suspension Take 5 mL by mouth every 12 (twelve) hours as needed for cough for up to 10 days Max Daily Amount: 10 mL, Starting Sat 6/11/2022, Until Tue 6/21/2022 at 2359, Normal      nirmatrelvir & ritonavir (Paxlovid) tablet therapy pack Take 3 tablets by mouth 2 (two) times a day for 5 days Take 2 nirmatrelvir tablets + 1 ritonavir tablet together per dose, Starting Sat 6/11/2022, Until Thu 6/16/2022, Normal         CONTINUE these medications which have NOT CHANGED    Details   al mag oxide-diphenhydramine-lidocaine viscous (MAGIC MOUTHWASH) 1:1:1 suspension Swish and spit 10 mL every 4 (four) hours as needed for mouth pain or discomfort for up to 5 days, Starting Fri 6/10/2022, Until Wed 6/15/2022 at 2359, Normal      benzonatate (TESSALON PERLES) 100 mg capsule Take 1 capsule (100 mg total) by mouth 3 (three) times a day as needed for cough, Starting Fri 6/10/2022, Normal      busPIRone (BUSPAR) 5 mg tablet Take 2 tablets (10 mg total) by mouth in the morning, Starting Tue 4/19/2022, Normal      cholecalciferol (VITAMIN D3) 1,000 units tablet Take 2,000 Units by mouth daily, Historical Med      diazepam (VALIUM) 5 mg tablet Take 1 tablet (5 mg total) by mouth daily as needed for anxiety No driving with this  No alcohol with this  Do not combine with zolpidem, Starting Mon 1/24/2022, Normal      estradiol (ESTRACE VAGINAL) 0 1 mg/g vaginal cream Insert 1 g into the vagina 2 (two) times a week, Starting Thu 10/22/2020, Normal      Multiple Vitamins-Minerals (CENTRUM SILVER ADULT 50+) TABS Take 1 tablet by mouth daily  , Historical Med      Probiotic Product (PROBIOTIC COLON SUPPORT) CAPS Take 1 capsule by mouth daily  , Historical Med      simvastatin (ZOCOR) 40 mg tablet Take 1 tablet (40 mg total) by mouth daily, Starting Mon 3/21/2022, Normal      Vaginal Lubricant (REPLENS) GEL Insert into the vagina, Historical Med      zolpidem (AMBIEN) 10 mg tablet Take 1 tablet (10 mg total) by mouth daily at bedtime as needed for sleep, Starting Mon 1/24/2022, Normal             No discharge procedures on file      PDMP Review       Value Time User    PDMP Reviewed  Yes 1/24/2022  9:19 AM Jossie Wood MD          ED Provider  Electronically Signed by           Master Bernabe PA-C  06/11/22 4714

## 2022-06-13 ENCOUNTER — TELEMEDICINE (OUTPATIENT)
Dept: INTERNAL MEDICINE CLINIC | Facility: CLINIC | Age: 67
End: 2022-06-13
Payer: MEDICARE

## 2022-06-13 DIAGNOSIS — U07.1 COVID-19: Primary | ICD-10-CM

## 2022-06-13 PROCEDURE — 99213 OFFICE O/P EST LOW 20 MIN: CPT | Performed by: INTERNAL MEDICINE

## 2022-06-13 RX ORDER — BEBTELOVIMAB 87.5 MG/ML
175 INJECTION, SOLUTION INTRAVENOUS ONCE
Status: CANCELLED | OUTPATIENT
Start: 2022-06-15

## 2022-06-13 RX ORDER — ACETAMINOPHEN 325 MG/1
650 TABLET ORAL ONCE AS NEEDED
Status: CANCELLED | OUTPATIENT
Start: 2022-06-15

## 2022-06-13 RX ORDER — SODIUM CHLORIDE 9 MG/ML
20 INJECTION, SOLUTION INTRAVENOUS ONCE
Status: CANCELLED | OUTPATIENT
Start: 2022-06-15

## 2022-06-13 RX ORDER — ONDANSETRON 2 MG/ML
4 INJECTION INTRAMUSCULAR; INTRAVENOUS ONCE AS NEEDED
Status: CANCELLED | OUTPATIENT
Start: 2022-06-15

## 2022-06-13 RX ORDER — ALBUTEROL SULFATE 90 UG/1
3 AEROSOL, METERED RESPIRATORY (INHALATION) ONCE AS NEEDED
Status: CANCELLED | OUTPATIENT
Start: 2022-06-15

## 2022-06-13 NOTE — PROGRESS NOTES
COVID-19 Outpatient Progress Note    Assessment/Plan:    Problem List Items Addressed This Visit        Other    COVID-19 - Primary         Disposition:     Patient has COVID-19 infection  Based off CDC guidelines, they were recommended to isolate for 5 days from the date of the positive test  If they remain asymptomatic, isolation may be ended followed by 5 days of wearing a mask when around othes to minimize risk of infecting others  If they have a fever, continue to stay home until fever resolves for at least 24 hours  Discussed symptom directed medication options with patient  She is on simvastatin so Paxlovid is contraindicated      Patient meets criteria for Bebtelovimab infusion  They were counseled in regards to risks, benefits, and side effects of this infusion  Marrian Ivory is an investigational medicine used to treat mild-to-moderate symptoms of COVID-19 in adults and children (15years of age and older weighing at least 80 pounds (40 kg)) with positive results of direct SARS-CoV-2 viral testing, and who are at high risk of progression to severe COVID-19, including hospitalization or death, and for whom other COVID-19 treatment options approved or authorized by FDA are not available or clinically appropriate  Bebtelovimab is investigational because it is still being studied  There is limited information about the safety and effectiveness of using bebtelovimab to treat people with mild-to-moderate COVID-19  The FDA has authorized the emergency use of bebtelovimab for the treatment of COVID-19 under an Emergency Use Authorization (EUA)       Marrian Ivory is not authorized for use in people who:  - are likely to be infected with a SARS-CoV-2 variant that is not able to be treated by bebtelovimab based on the circulating variants in your area (ask your health care provider about FDA and CDCs latest information on circulating variants by geographic area), or  - are hospitalized due to COVID-19, or  - require oxygen therapy and/or respiratory support due to COVID-19, or  - require an increase in baseline oxygen flow rate and/or respiratory support due to COVID19 and are on chronic oxygen therapy and/or respiratory support due to underlying nonCOVID-19 related comorbidity  How will I receive Bebtelovimab? Kojo Tolbertter will be given as an injection through a vein (intravenously or IV) over at least 30 seconds  You will be observed by your healthcare provider for at least 1 hour after you receive bebtelovimab  Possible side effects of Bebtelovimab: Allergic reactions can happen during and after infusion with bebtelovimab  Possible reactions include: fever, chills, nausea, headache, shortness of breath, low or high blood pressure, rapid or slow heart rate, chest discomfort or pain, weakness, confusion, feeling tired, wheezing, swelling of your lips, face, or throat, rash including hives, itching, muscle aches, dizziness, and sweating  These reactions may be severe or life threatening  Worsening symptoms after treatment: You may experience new or worsening symptoms after infusion, including fever, difficulty breathing, rapid or slow heart rate, tiredness, weakness or confusion  If these occur, contact your healthcare provider or seek immediate medical attention as some of these events have required hospitalization  It is unknown if these events are related to treatment or are due to the progression of COVID19  The side effects of getting any medicine by vein may include brief pain, bleeding, bruising of the skin, soreness, swelling, and possible infection at the infusion site  These are not all the possible side effects of bebtelovimab  Not a lot of people have been given bebtelovimab  Serious and unexpected side effects may happen  Bebtelovimab are still being studied so it is possible that all of the risks are not known at this time       It is possible that bebtelovimab could interfere with your body's own ability to fight off a future infection of SARS-CoV-2  Similarly, bebtelovimab may reduce your bodys immune response to a vaccine for SARS-CoV-2  Specific studies have not been conducted to address these possible risks  Talk to your healthcare provider if you have any questions  Emergency Use Authorization:    [facility] FDA has made bebtelovimab available under an emergency access mechanism called an EUA  The EUA is supported by a  of Health and Human Service ([facility]) declaration that circumstances exist to justify the emergency use of drugs and biological products during the COVID-19 pandemic  Bebtelovimab have not undergone the same type of review as an FDA-approved or cleared product  The FDA may issue an EUA when certain criteria are met, which includes that there are no adequate, approved, and available alternatives  In addition, the FDA decision is based on the totality of scientific evidence available showing that it is reasonable to believe that the product meets certain criteria for safety, performance, and labeling and may be effective in treatment of patients during the COVID-19 pandemic  All of these criteria must be met to allow for the product to be used in the treatment of patients during the COVID-19 pandemic  The EUA for bebtelovimab together is in effect for the duration of the COVID-19 declaration justifying emergency use of these products, unless terminated or revoked (after which the product may no longer be used)  What if I am pregnant or breastfeeding? There is no experience treating pregnant women or breastfeeding mothers with bebtelovimab  For a mother and unborn baby, the benefit of receiving bebtelovimab may be greater than the risk from the treatment  If you are pregnant or breastfeeding, discuss your options and specific situation with your healthcare provider  How do I report side effects with Bebtelovimab?   Contact your healthcare provider if you have any side effects that bother you or do not go away  Report side effects to FDA MedWatch at www fda gov/medwatch, or call 4-601-SHK-8194 or to [address]  as shown below  Email: uW@yahoo com  com   Fax number: [phone]   Telephone number: [phone]-[phone] ([phone])    Full fact sheet document for patients can be found at: http://[employer]/    The patient consents to proceed with bebtelovimab infusion  I have spent 15 minutes directly with the patient  Greater than 50% of this time was spent in counseling/coordination of care regarding: risks and benefits of treatment options, instructions for management, patient and family education and impressions  May take Imodium PRN     Encounter provider Monica Mark MD    Provider located at [address] [place] [id number]    Recent Visits  No visits were found meeting these conditions  Showing recent visits within past 7 days and meeting all other requirements  Today's Visits  Date Type Provider Dept   06/13/22 Telemedicine Monica Mark MD [facility] today's visits and meeting all other requirements  Future Appointments  No visits were found meeting these conditions  Showing future appointments within next 150 days and meeting all other requirements     This virtual check-in was done via [employer] and patient was informed that this is not a secure, HIPAA-compliant platform  She agrees to proceed  Patient agrees to participate in a virtual check in via telephone or video visit instead of presenting to the office to address urgent/immediate medical needs  Patient is aware this is a billable service  After connecting through [facility], the patient was identified by name and date of birth   Wendall Nemaha was informed that this was a telemedicine visit and that the exam was being conducted confidentially over secure lines  My office door was closed  No one else was in the room  Jeff Lee acknowledged consent and understanding of privacy and security of the telemedicine visit  I informed the patient that I have reviewed her record in Epic and presented the opportunity for her to ask any questions regarding the visit today  The patient agreed to participate  Verification of patient location:  Patient is located in the following state in which I hold an active license: [place]    Subjective:   Jeff Lee is a 74 y o  female who has been screened for COVID-19  Symptom change since last report: unchanged  Patient's symptoms include fever, nasal congestion, rhinorrhea, sore throat, anosmia, loss of taste, cough, diarrhea and myalgias  Patient denies shortness of breath, abdominal pain, vomiting and headaches  - Date of symptom onset: 6/10/2022  - Date of positive COVID-19 test: 6/10/2022  Type of test: PCR  COVID-19 vaccination status: Fully vaccinated with booster    Mojgan Tomlinson has been staying home and has isolated themselves in her home  She is taking care to not share personal items and is cleaning all surfaces that are touched often, like counters, tabletops, and doorknobs using household cleaning sprays or wipes  She is wearing a mask when she leaves her room  Taking Tylenol only  She first went to then urgent care first and PCR performed  She tested +at home waiting for the result  She was sent to the ER when it came back + the following day     She was prescribed Paxlovid yesterday but she did not fill it until she spoke to me    Lab Results   Component Value Date    SARSCOV2 Positive (A) 06/10/2022     Past Medical History:   Diagnosis Date    Allergic     Anxiety     Chronic kidney disease     Hyperlipidemia     Recurrent UTI     Screening for AAA (abdominal aortic aneurysm) 5/3/2018    fam hx aaa- check screen- last one done 2016- ectatic at 2 7  check labs     Screening for cardiovascular condition 5/3/2018    Tonsillitis      Past Surgical History:   Procedure Laterality Date    CHOLECYSTECTOMY      CHOLECYSTECTOMY OPEN      COLONOSCOPY  2014    HYSTERECTOMY N/A     uterus removed age 25   [facility] HYSTERECTOM[facility]      LEG SURGERY Left     OOPHORECTOMY      SALIVARY GLAND SURGERY Right     excision of parotid tumor/gland    SALPINGOOPHORECTOMY Bilateral     age 48   [facility] THYROID BIOPSY  3/1/2022     Current Outpatient Medications   Medication Sig Dispense Refill    al mag oxide-diphenhydramine-lidocaine viscous (MAGIC MOUTHWASH) 1:1:1 suspension Swish and spit 10 mL every 4 (four) hours as needed for mouth pain or discomfort for up to 5 days 90 mL 0    albuterol (ProAir HFA) 90 mcg/act inhaler Inhale 2 puffs every 4 (four) hours as needed for wheezing 18 g 0    benzonatate (TESSALON PERLES) 100 mg capsule Take 1 capsule (100 mg total) by mouth 3 (three) times a day as needed for cough 20 capsule 0    busPIRone (BUSPAR) 5 mg tablet Take 2 tablets (10 mg total) by mouth in the morning 60 tablet 5    cholecalciferol (VITAMIN D3) 1,000 units tablet Take 2,000 Units by mouth daily      diazepam (VALIUM) 5 mg tablet Take 1 tablet (5 mg total) by mouth daily as needed for anxiety No driving with this  No alcohol with this   Do not combine with zolpidem 30 tablet 0    estradiol (ESTRACE VAGINAL) 0 1 mg/g vaginal cream Insert 1 g into the vagina 2 (two) times a week 42 5 g 3    hydrocodone-chlorpheniramine polistirex (TUSSIONEX) 10-8 mg/5 mL ER suspension Take 5 mL by mouth every 12 (twelve) hours as needed for cough for up to 10 days Max Daily Amount: 10 mL 120 mL 0    Multiple Vitamins-Minerals (CENTRUM SILVER ADULT 50+) TABS Take 1 tablet by mouth daily        Probiotic Product (PROBIOTIC COLON SUPPORT) CAPS Take 1 capsule by mouth daily        simvastatin (ZOCOR) 40 mg tablet Take 1 tablet (40 mg total) by mouth daily 90 tablet 0    Vaginal Lubricant (REPLENS) GEL Insert into the vagina      zolpidem (AMBIEN) 10 mg tablet Take 1 tablet (10 mg total) by mouth daily at bedtime as needed for sleep 30 tablet 0     No current facility-administered medications for this visit  Allergies   Allergen Reactions    Augmentin [Amoxicillin-Pot Clavulanate] GI Intolerance    Macrobid [Nitrofurantoin] GI Intolerance       Review of Systems   Constitutional: Positive for fever  HENT: Positive for congestion, rhinorrhea and sore throat  Respiratory: Positive for cough  Negative for shortness of breath  Gastrointestinal: Positive for diarrhea  Negative for abdominal pain and vomiting  Musculoskeletal: Positive for myalgias  Neurological: Negative for headaches  Objective: There were no vitals filed for this visit  Physical Exam  Constitutional:       General: She is not in acute distress  Appearance: She is ill-appearing  She is not toxic-appearing or diaphoretic  Pulmonary:      Effort: No respiratory distress  Psychiatric:         Mood and Affect: Mood normal          Behavior: Behavior normal          VIRTUAL VISIT DISCLAIMER    Matilda Hernandez verbally agrees to participate in [employer]  Pt is aware that [employer] could be limited without vital signs or the ability to perform a full hands-on physical exam  Matilda Hernandez understands she or the provider may request at any time to terminate the video visit and request the patient to seek care or treatment in person

## 2022-06-15 ENCOUNTER — HOSPITAL ENCOUNTER (OUTPATIENT)
Dept: INFUSION CENTER | Facility: HOSPITAL | Age: 67
Discharge: HOME | End: 2022-06-15
Payer: MEDICARE

## 2022-06-15 VITALS
TEMPERATURE: 98.2 F | OXYGEN SATURATION: 99 % | HEART RATE: 62 BPM | DIASTOLIC BLOOD PRESSURE: 74 MMHG | SYSTOLIC BLOOD PRESSURE: 133 MMHG | RESPIRATION RATE: 18 BRPM

## 2022-06-15 DIAGNOSIS — U07.1 COVID-19: Primary | ICD-10-CM

## 2022-06-15 PROCEDURE — M0222 HB BEBTELOVIMAB INJECTION: HCPCS | Performed by: INTERNAL MEDICINE

## 2022-06-15 RX ORDER — BEBTELOVIMAB 87.5 MG/ML
175 INJECTION, SOLUTION INTRAVENOUS ONCE
Status: CANCELLED | OUTPATIENT
Start: 2022-06-15

## 2022-06-15 RX ORDER — ALBUTEROL SULFATE 90 UG/1
3 AEROSOL, METERED RESPIRATORY (INHALATION) ONCE AS NEEDED
Status: DISCONTINUED | OUTPATIENT
Start: 2022-06-15 | End: 2022-06-18 | Stop reason: HOSPADM

## 2022-06-15 RX ORDER — ACETAMINOPHEN 325 MG/1
650 TABLET ORAL ONCE AS NEEDED
Status: CANCELLED | OUTPATIENT
Start: 2022-06-15

## 2022-06-15 RX ORDER — BEBTELOVIMAB 87.5 MG/ML
175 INJECTION, SOLUTION INTRAVENOUS ONCE
Status: COMPLETED | OUTPATIENT
Start: 2022-06-15 | End: 2022-06-15

## 2022-06-15 RX ORDER — SODIUM CHLORIDE 9 MG/ML
20 INJECTION, SOLUTION INTRAVENOUS ONCE
Status: CANCELLED | OUTPATIENT
Start: 2022-06-15

## 2022-06-15 RX ORDER — ACETAMINOPHEN 325 MG/1
650 TABLET ORAL ONCE AS NEEDED
Status: DISCONTINUED | OUTPATIENT
Start: 2022-06-15 | End: 2022-06-18 | Stop reason: HOSPADM

## 2022-06-15 RX ORDER — SODIUM CHLORIDE 9 MG/ML
20 INJECTION, SOLUTION INTRAVENOUS ONCE
Status: DISCONTINUED | OUTPATIENT
Start: 2022-06-15 | End: 2022-06-18 | Stop reason: HOSPADM

## 2022-06-15 RX ORDER — ALBUTEROL SULFATE 90 UG/1
3 AEROSOL, METERED RESPIRATORY (INHALATION) ONCE AS NEEDED
Status: CANCELLED | OUTPATIENT
Start: 2022-06-15

## 2022-06-15 RX ORDER — ONDANSETRON 2 MG/ML
4 INJECTION INTRAMUSCULAR; INTRAVENOUS ONCE AS NEEDED
Status: DISCONTINUED | OUTPATIENT
Start: 2022-06-15 | End: 2022-06-18 | Stop reason: HOSPADM

## 2022-06-15 RX ORDER — ONDANSETRON 2 MG/ML
4 INJECTION INTRAMUSCULAR; INTRAVENOUS ONCE AS NEEDED
Status: CANCELLED | OUTPATIENT
Start: 2022-06-15

## 2022-06-15 RX ADMIN — BEBTELOVIMAB 175 MG: 87.5 INJECTION, SOLUTION INTRAVENOUS at 07:56

## 2022-06-16 ENCOUNTER — TELEMEDICINE (OUTPATIENT)
Dept: INTERNAL MEDICINE CLINIC | Facility: CLINIC | Age: 67
End: 2022-06-16
Payer: MEDICARE

## 2022-06-16 DIAGNOSIS — U07.1 COVID-19: Primary | ICD-10-CM

## 2022-06-16 PROCEDURE — 99213 OFFICE O/P EST LOW 20 MIN: CPT | Performed by: INTERNAL MEDICINE

## 2022-06-16 NOTE — PROGRESS NOTES
COVID-19 Outpatient Progress Note    Assessment/Plan:    Problem List Items Addressed This Visit        Other    COVID-19 - Primary     Tested positive for COVID 6/10  Had monoclonal antibody infusion yesterday  Patient reported bilateral lower extremity pain since the infusion, without weakness  Is able to ambulate  Denied lower extremity swelling, color change, rash  Overall is feeling better since was tested positive for COVID  Still has some nasal congestion, diarrhea, decreased appetite, mild cough and fatigue  Recommended patient to continue symptomatic management with Tylenol as needed, ibuprofen or naproxen for myalgias, multivitamins, keep herself well hydrated  If worsening lower extremity pain or worsening symptoms to go to the emergency room or call us  Otherwise will schedule follow-up in 4 days                Disposition:     Patient has COVID-19 infection  Based off CDC guidelines, they were recommended to isolate for 5 days from the date of the positive test  If they remain asymptomatic, isolation may be ended followed by 5 days of wearing a mask when around othes to minimize risk of infecting others  If they have a fever, continue to stay home until fever resolves for at least 24 hours  Discussed symptom directed medication options with patient  I have spent 15 minutes directly with the patient  Greater than 50% of this time was spent in counseling/coordination of care regarding: instructions for management, patient and family education, risk factor reductions and impressions        Encounter provider Corina Daniel MD    Provider located at [address] [address] [id number]    Recent Visits  Date Type Provider Dept   06/13/22 Telemedicine Venessa López MD [facility] recent visits within past 7 days and meeting all other requirements  Today's Visits  Date Type Provider Dept   06/16/22 Telemedicine Marysol Beasley MD [facility] today's visits and meeting all other requirements  Future Appointments  No visits were found meeting these conditions  Showing future appointments within next 150 days and meeting all other requirements     This virtual check-in was done via [employer], video  Per patient request  and patient was informed that this is not a secure, HIPAA-compliant platform  She agrees to proceed  Patient agrees to participate in a virtual check in via telephone or video visit instead of presenting to the office to address urgent/immediate medical needs  Patient is aware this is a billable service  After connecting through [place], the patient was identified by name and date of birth  Felix Piedra was informed that this was a telemedicine visit and that the exam was being conducted confidentially over secure lines  My office door was closed  No one else was in the room  Felix Piedra acknowledged consent and understanding of privacy and security of the telemedicine visit  I informed the patient that I have reviewed her record in Epic and presented the opportunity for her to ask any questions regarding the visit today  The patient agreed to participate  Verification of patient location:  Patient is located in the following state in which I hold an active license: [place]    Subjective:   Felix Piedra is a 74 y o  female who has been screened for COVID-19  Symptom change since last report: improving  Patient's symptoms include fatigue, malaise, nasal congestion, anosmia, loss of taste, cough, diarrhea and myalgias  - Date of positive COVID-19 test: 6/10/2022  COVID-19 vaccination status: Fully vaccinated with booster    Monoclonal Antibody Follow-up Symptom Questionnaire  I feel overall: much better  My fever is: better    Monoclonal Antibody Adverse Reaction:   Patient received Bebtelovimab on: 6/15/2022   Patient received covid monoclonal antibody therapy and reports an adverse reaction  General side effects: muscle aches  Lab Results   Component Value Date    SARSCOV2 Positive (A) 06/10/2022     Past Medical History:   Diagnosis Date    Allergic     Anxiety     Chronic kidney disease     Hyperlipidemia     Recurrent UTI     Screening for AAA (abdominal aortic aneurysm) 5/3/2018    fam hx aaa- check screen- last one done 2016- ectatic at 2 7  check labs     Screening for cardiovascular condition 5/3/2018    Tonsillitis      Past Surgical History:   Procedure Laterality Date    CHOLECYSTECTOMY      CHOLECYSTECTOMY OPEN      COLONOSCOPY  2014    HYSTERECTOMY N/A     uterus removed age 25   [facility]ST[facility]CTOMY      LEG SURGERY Left     OOPHORECTOMY      SALIVARY GLAND SURGERY Right     excision of parotid tumor/gland    SALPINGOOPHORECTOMY Bilateral     age 48   [address]  3/1/2022     Current Outpatient Medications   Medication Sig Dispense Refill    al mag oxide-diphenhydramine-lidocaine viscous (MAGIC MOUTHWASH) 1:1:1 suspension SWISH AND SPIT 10 ML BY MOUTH EVERY 4 HOURS AS NEEDED FOR MOUTH PAIN OR DISCOMFORT FOR UP TO 5 DAYS      albuterol (ProAir HFA) 90 mcg/act inhaler Inhale 2 puffs every 4 (four) hours as needed for wheezing 18 g 0    benzonatate (TESSALON PERLES) 100 mg capsule Take 1 capsule (100 mg total) by mouth 3 (three) times a day as needed for cough 20 capsule 0    busPIRone (BUSPAR) 5 mg tablet Take 2 tablets (10 mg total) by mouth in the morning 60 tablet 5    cholecalciferol (VITAMIN D3) 1,000 units tablet Take 2,000 Units by mouth daily      diazepam (VALIUM) 5 mg tablet Take 1 tablet (5 mg total) by mouth daily as needed for anxiety No driving with this  No alcohol with this   Do not combine with zolpidem 30 tablet 0    estradiol (ESTRACE VAGINAL) 0 1 mg/g vaginal cream Insert 1 g into the vagina 2 (two) times a week 42 5 g 3    hydrocodone-chlorpheniramine polistirex (TUSSIONEX) 10-8 mg/5 mL ER suspension Take 5 mL by mouth every 12 (twelve) hours as needed for cough for up to 10 days Max Daily Amount: 10 mL 120 mL 0    Multiple Vitamins-Minerals (CENTRUM SILVER ADULT 50+) TABS Take 1 tablet by mouth daily        Probiotic Product (PROBIOTIC COLON SUPPORT) CAPS Take 1 capsule by mouth daily        simvastatin (ZOCOR) 40 mg tablet Take 1 tablet (40 mg total) by mouth daily 90 tablet 0    Vaginal Lubricant (REPLENS) GEL Insert into the vagina      zolpidem (AMBIEN) 10 mg tablet Take 1 tablet (10 mg total) by mouth daily at bedtime as needed for sleep 30 tablet 0     No current facility-administered medications for this visit  Facility-Administered Medications Ordered in Other Visits   Medication Dose Route Frequency Provider Last Rate Last Admin    acetaminophen (TYLENOL) tablet 650 mg  650 mg Oral Once PRN Aleena Chin MD        albuterol (PROVENTIL HFA,VENTOLIN HFA) inhaler 3 puff  3 puff Inhalation Once PRN Aleena Chin MD        ondansetron [facility] injection 4 mg  4 mg Intravenous Once PRN Aleena Chin MD        sodium chloride 0 9 % infusion  20 mL/hr Intravenous Once Aleena Chin MD         Allergies   Allergen Reactions    Augmentin [Amoxicillin-Pot Clavulanate] GI Intolerance    Macrobid [Nitrofurantoin] GI Intolerance       Review of Systems   Constitutional: Positive for activity change, appetite change, diaphoresis and fatigue  HENT: Positive for congestion  Respiratory: Positive for cough  Negative for wheezing  Cardiovascular: Negative for chest pain  Gastrointestinal: Positive for diarrhea  Negative for constipation  Musculoskeletal: Positive for myalgias  Negative for gait problem  Skin: Negative  Neurological: Negative for weakness  Psychiatric/Behavioral: Negative for sleep disturbance     All other systems reviewed and are negative   06/16/22 10:14 AM      Thank you,  Adithya Vaughn MD  [facility]  Objective: There were no vitals filed for this visit  Physical Exam    VIRTUAL VISIT DISCLAIMER    Matilda Hernandez verbally agrees to participate in [employer]  Pt is aware that [employer] could be limited without vital signs or the ability to perform a full hands-on physical exam  Matilda Hernandez understands she or the provider may request at any time to terminate the video visit and request the patient to seek care or treatment in person

## 2022-06-16 NOTE — ASSESSMENT & PLAN NOTE
Tested positive for COVID 6/10  Had monoclonal antibody infusion yesterday  Patient reported bilateral lower extremity pain since the infusion, without weakness  Is able to ambulate  Denied lower extremity swelling, color change, rash  Overall is feeling better since was tested positive for COVID  Still has some nasal congestion, diarrhea, decreased appetite, mild cough and fatigue  Recommended patient to continue symptomatic management with Tylenol as needed, ibuprofen or naproxen for myalgias, multivitamins, keep herself well hydrated  If worsening lower extremity pain or worsening symptoms to go to the emergency room or call us    Otherwise will schedule follow-up in 4 days

## 2022-06-20 ENCOUNTER — TELEMEDICINE (OUTPATIENT)
Dept: INTERNAL MEDICINE CLINIC | Facility: CLINIC | Age: 67
End: 2022-06-20
Payer: MEDICARE

## 2022-06-20 VITALS — HEIGHT: 63 IN | BODY MASS INDEX: 25.52 KG/M2 | OXYGEN SATURATION: 98 % | WEIGHT: 144 LBS | HEART RATE: 98 BPM

## 2022-06-20 DIAGNOSIS — U07.1 COVID-19: Primary | ICD-10-CM

## 2022-06-20 PROCEDURE — 99213 OFFICE O/P EST LOW 20 MIN: CPT | Performed by: INTERNAL MEDICINE

## 2022-06-21 ENCOUNTER — TELEPHONE (OUTPATIENT)
Dept: RADIOLOGY | Facility: CLINIC | Age: 67
End: 2022-06-21

## 2022-06-21 NOTE — PROGRESS NOTES
Patient Name: Elma Stinson     : 1955     MRN: [id number]    Assessment/Plan:    Covid 19 infection    - covid 19 infection in a patient vaccinated due for booster  - s/p bebtelovimab with significant improvement of symptoms  - recovered sense of taste and smell, mild residual cough and fatigue  - has remained fever free >48  plan  - continue oral hydration, robitussin for cough  - follow to closest ED if clinical deterioration        Discussion:     Other management recommendations:     Dyspnea & cough   Discussed the need for deep breathing and breathing exercises  Recommend cough suppressant (eg, benzonatate, guaifenesin, dextromethorphan)           I spent 35 minutes directly with the patient during this visit     Subjective:    COVID-19 Infection:  Date of symptom onset: 6/10/2022  Date of positive test: 6/10/2022    New or persistent symptoms:  Patient complains of: fatigue and cough  Patient rates severity of current symptoms as mild  Inpatient treatment:      Was patient hospitalized?: No      Outpatient treatment:  - Patient received monoclonal antibody therapy: Yes      Review of Systems   Constitutional: Positive for fatigue  Respiratory: Positive for cough  Patient Active Problem List   Diagnosis    Sleep disturbance    PROSPER (generalized anxiety disorder)    Atrophic vaginitis    Chronic left-sided low back pain without sciatica    Eustachian tube disorder, bilateral    Insomnia, controlled    MVP (mitral valve prolapse)    Nephrolithiasis    Osteopenia    Mixed hyperlipidemia    Thyroid nodule    Recurrent UTI    Renal cyst, acquired, left    Glaucoma suspect of both eyes    Peripheral nerve entrapment syndrome    Lumbar spondylosis    COVID-19     Social History     Tobacco Use    Smoking status: Never Smoker    Smokeless tobacco: Never Used   Vaping Use    Vaping Use: Never used   Substance Use Topics    Alcohol use: No    Drug use:  No Objective:  Pulse 98   Ht 5' 3" (1 6 m)   Wt 65 3 kg (144 lb)   SpO2 98%   BMI 25 51 kg/m²      Physical Exam  Constitutional:       General: She is not in acute distress  Appearance: Normal appearance  She is not ill-appearing or toxic-appearing  Neurological:      Mental Status: She is alert       Rest of the physical exam limited due to the nature of the encounter    Nutrition Assessment and Intervention:     Reviewed food recall journal      Physical Activity Assessment and Intervention:    Activity journal reviewed      Emotional and Mental Well-being, Sleep, Connectedness Assessment and Intervention:    Sleep/stress assessment performed      Tobacco and Toxic Substance Assessment and Intervention:     Tobacco use screening performed    Alcohol and drug use screening performed            Ermias Gross MD

## 2022-06-21 NOTE — TELEPHONE ENCOUNTER
Called pt, scheduled LT L3-L5 MBB#2 on Thurs 07/14/22 at 13:30  Went over pre procedure instructions, no vaccinations 2 weeks prior/post proc, NPO 1 hr prior, if sick or on abx needs to call to rs, wear loose, comf clothing- no buttons/zippers, needs   Pt verbalized understanding  Explained confirmatory block to pt

## 2022-07-08 DIAGNOSIS — E78.01 FAMILIAL HYPERCHOLESTEROLEMIA: ICD-10-CM

## 2022-07-08 RX ORDER — SIMVASTATIN 40 MG
40 TABLET ORAL DAILY
Qty: 90 TABLET | Refills: 0 | Status: SHIPPED | OUTPATIENT
Start: 2022-07-08 | End: 2022-10-25

## 2022-07-14 ENCOUNTER — HOSPITAL ENCOUNTER (OUTPATIENT)
Dept: RADIOLOGY | Facility: CLINIC | Age: 67
Discharge: HOME | End: 2022-07-14
Admitting: ANESTHESIOLOGY
Payer: MEDICARE

## 2022-07-14 VITALS
HEART RATE: 64 BPM | DIASTOLIC BLOOD PRESSURE: 84 MMHG | SYSTOLIC BLOOD PRESSURE: 135 MMHG | TEMPERATURE: 97.6 F | OXYGEN SATURATION: 95 % | RESPIRATION RATE: 18 BRPM

## 2022-07-14 DIAGNOSIS — M47.816 LUMBAR SPONDYLOSIS: ICD-10-CM

## 2022-07-14 PROCEDURE — 64493 INJ PARAVERT F JNT L/S 1 LEV: CPT | Performed by: ANESTHESIOLOGY

## 2022-07-14 PROCEDURE — 64494 INJ PARAVERT F JNT L/S 2 LEV: CPT | Performed by: ANESTHESIOLOGY

## 2022-07-14 RX ORDER — BUPIVACAINE HYDROCHLORIDE 7.5 MG/ML
5 INJECTION, SOLUTION EPIDURAL; RETROBULBAR ONCE
Status: COMPLETED | OUTPATIENT
Start: 2022-07-14 | End: 2022-07-14

## 2022-07-14 RX ADMIN — BUPIVACAINE HYDROCHLORIDE 1.5 ML: 7.5 INJECTION, SOLUTION EPIDURAL; RETROBULBAR at 13:43

## 2022-07-14 NOTE — DISCHARGE INSTR - LAB

## 2022-07-14 NOTE — H&P
History of Present Illness:  The patient is a 74 y o  female who presents with complaints of left lower back pain is here today for confirmatory left L3-5 medial branch blocks    Patient Active Problem List   Diagnosis    Sleep disturbance    PROSPER (generalized anxiety disorder)    Atrophic vaginitis    Chronic left-sided low back pain without sciatica    Eustachian tube disorder, bilateral    Insomnia, controlled    MVP (mitral valve prolapse)    Nephrolithiasis    Osteopenia    Mixed hyperlipidemia    Thyroid nodule    Recurrent UTI    Renal cyst, acquired, left    Glaucoma suspect of both eyes    Peripheral nerve entrapment syndrome    Lumbar spondylosis    COVID-19       Past Medical History:   Diagnosis Date    Allergic     Anxiety     Chronic kidney disease     Hyperlipidemia     Recurrent UTI     Screening for AAA (abdominal aortic aneurysm) 5/3/2018    fam hx aaa- check screen- last one done 2016- ectatic at 2 7  check labs     Screening for cardiovascular condition 5/3/2018    Tonsillitis        Past Surgical History:   Procedure Laterality Date    CHOLECYSTECTOMY      CHOLECYSTECTOMY OPEN      COLONOSCOPY  2014    HYSTERECTOMY N/A     uterus removed age 25   Ericka Nine HYSTERECTOMY      LEG SURGERY Left     OOPHORECTOMY      SALIVARY GLAND SURGERY Right     excision of parotid tumor/gland    SALPINGOOPHORECTOMY Bilateral     age 48   [address]  3/1/2022         Current Outpatient Medications:     al mag oxide-diphenhydramine-lidocaine viscous (MAGIC MOUTHWASH) 1:1:1 suspension, SWISH AND SPIT 10 ML BY MOUTH EVERY 4 HOURS AS NEEDED FOR MOUTH PAIN OR DISCOMFORT FOR UP TO 5 DAYS (Patient not taking: Reported on 6/20/2022), Disp: , Rfl:     albuterol (ProAir HFA) 90 mcg/act inhaler, Inhale 2 puffs every 4 (four) hours as needed for wheezing (Patient not taking: Reported on 6/20/2022), Disp: 18 g, Rfl: 0    benzonatate (TESSALON PERLES) 100 mg capsule, Take 1 capsule (100 mg total) by mouth 3 (three) times a day as needed for cough (Patient not taking: Reported on 6/20/2022), Disp: 20 capsule, Rfl: 0    busPIRone (BUSPAR) 5 mg tablet, Take 2 tablets (10 mg total) by mouth in the morning, Disp: 60 tablet, Rfl: 5    cholecalciferol (VITAMIN D3) 1,000 units tablet, Take 2,000 Units by mouth daily, Disp: , Rfl:     diazepam (VALIUM) 5 mg tablet, Take 1 tablet (5 mg total) by mouth daily as needed for anxiety No driving with this  No alcohol with this   Do not combine with zolpidem, Disp: 30 tablet, Rfl: 0    estradiol (ESTRACE VAGINAL) 0 1 mg/g vaginal cream, Insert 1 g into the vagina 2 (two) times a week, Disp: 42 5 g, Rfl: 3    Multiple Vitamins-Minerals (CENTRUM SILVER ADULT 50+) TABS, Take 1 tablet by mouth daily  , Disp: , Rfl:     Probiotic Product (PROBIOTIC COLON SUPPORT) CAPS, Take 1 capsule by mouth daily  , Disp: , Rfl:     simvastatin (ZOCOR) 40 mg tablet, Take 1 tablet (40 mg total) by mouth daily, Disp: 90 tablet, Rfl: 0    Vaginal Lubricant (REPLENS) GEL, Insert into the vagina (Patient not taking: Reported on 6/20/2022), Disp: , Rfl:     zolpidem (AMBIEN) 10 mg tablet, Take 1 tablet (10 mg total) by mouth daily at bedtime as needed for sleep, Disp: 30 tablet, Rfl: 0    Current Facility-Administered Medications:     bupivacaine (PF) (MARCAINE) 0 75 % injection 5 mL, 5 mL, Other, Once, Ely Lambert MD    Allergies   Allergen Reactions    Augmentin [Amoxicillin-Pot Clavulanate] GI Intolerance    Macrobid [Nitrofurantoin] GI Intolerance       Physical Exam:   Vitals:    07/14/22 1328   BP: 136/84   Pulse: 71   Resp: 18   Temp: 97 6 °F (36 4 °C)   SpO2: 97%     General: Awake, Alert, Oriented x 3, Mood and affect appropriate  Respiratory: Respirations even and unlabored  Cardiovascular: Peripheral pulses intact; no edema  Musculoskeletal Exam:  Left lower back pain    ASA Score: 2    Patient/Chart Verification  Patient ID Verified: Verbal  ID Band Applied: No  Consents Confirmed: Procedural, To be obtained in the Pre-Procedure area  H&P( within 30 days) Verified: To be obtained in the Pre-Procedure area  Allergies Reviewed: Yes  Anticoag/NSAID held?: No  Currently on antibiotics?: No    Assessment:   1   Lumbar spondylosis        Plan: Left L3-5 MBB#2

## 2022-07-18 ENCOUNTER — PROBLEM (OUTPATIENT)
Dept: URBAN - METROPOLITAN AREA CLINIC 6 | Facility: CLINIC | Age: 67
End: 2022-07-18

## 2022-07-18 ENCOUNTER — TELEPHONE (OUTPATIENT)
Dept: PAIN MEDICINE | Facility: CLINIC | Age: 67
End: 2022-07-18

## 2022-07-18 DIAGNOSIS — H43.811: ICD-10-CM

## 2022-07-18 DIAGNOSIS — H40.013: ICD-10-CM

## 2022-07-18 DIAGNOSIS — H25.813: ICD-10-CM

## 2022-07-18 PROCEDURE — 92250 FUNDUS PHOTOGRAPHY W/I&R: CPT

## 2022-07-18 PROCEDURE — 92014 COMPRE OPH EXAM EST PT 1/>: CPT

## 2022-07-18 ASSESSMENT — TONOMETRY
OD_IOP_MMHG: 21
OS_IOP_MMHG: 20

## 2022-07-18 ASSESSMENT — VISUAL ACUITY
OD_CC: 20/25+1
OS_CC: 20/20-1
OU_SC: J1-1

## 2022-07-18 NOTE — TELEPHONE ENCOUNTER
----- Message from Madelyn Reilly sent at 7/18/2022  1:36 PM EDT -----  Regarding: pain diary  Hi Dr Yesenia Gotti, please see pain diary scanned under media  Thank you

## 2022-07-19 ENCOUNTER — APPOINTMENT (OUTPATIENT)
Dept: LAB | Facility: CLINIC | Age: 67
End: 2022-07-19
Payer: MEDICARE

## 2022-07-19 DIAGNOSIS — E55.9 VITAMIN D DEFICIENCY: ICD-10-CM

## 2022-07-19 DIAGNOSIS — E04.1 THYROID NODULE: ICD-10-CM

## 2022-07-19 DIAGNOSIS — E78.2 MIXED HYPERLIPIDEMIA: ICD-10-CM

## 2022-07-19 LAB
25(OH)D3 SERPL-MCNC: 38.5 NG/ML (ref 30–100)
ALBUMIN SERPL BCP-MCNC: 3.7 G/DL (ref 3.5–5)
ALP SERPL-CCNC: 77 U/L (ref 46–116)
ALT SERPL W P-5'-P-CCNC: 21 U/L (ref 12–78)
ANION GAP SERPL CALCULATED.3IONS-SCNC: 3 MMOL/L (ref 4–13)
AST SERPL W P-5'-P-CCNC: 19 U/L (ref 5–45)
BILIRUB SERPL-MCNC: 0.48 MG/DL (ref 0.2–1)
BUN SERPL-MCNC: 24 MG/DL (ref 5–25)
CALCIUM SERPL-MCNC: 9.7 MG/DL (ref 8.3–10.1)
CHLORIDE SERPL-SCNC: 108 MMOL/L (ref 96–108)
CHOLEST SERPL-MCNC: 194 MG/DL
CO2 SERPL-SCNC: 27 MMOL/L (ref 21–32)
CREAT SERPL-MCNC: 0.89 MG/DL (ref 0.6–1.3)
ERYTHROCYTE [DISTWIDTH] IN BLOOD BY AUTOMATED COUNT: 13.4 % (ref 11.6–15.1)
GFR SERPL CREATININE-BSD FRML MDRD: 66 ML/MIN/1.73SQ M
GLUCOSE P FAST SERPL-MCNC: 114 MG/DL (ref 65–99)
HCT VFR BLD AUTO: 41.8 % (ref 34.8–46.1)
HDLC SERPL-MCNC: 51 MG/DL
HGB BLD-MCNC: 13.3 G/DL (ref 11.5–15.4)
LDLC SERPL CALC-MCNC: 105 MG/DL (ref 0–100)
MCH RBC QN AUTO: 29.6 PG (ref 26.8–34.3)
MCHC RBC AUTO-ENTMCNC: 31.8 G/DL (ref 31.4–37.4)
MCV RBC AUTO: 93 FL (ref 82–98)
PLATELET # BLD AUTO: 281 THOUSANDS/UL (ref 149–390)
PMV BLD AUTO: 10.5 FL (ref 8.9–12.7)
POTASSIUM SERPL-SCNC: 4 MMOL/L (ref 3.5–5.3)
PROT SERPL-MCNC: 7.5 G/DL (ref 6.4–8.4)
RBC # BLD AUTO: 4.49 MILLION/UL (ref 3.81–5.12)
SODIUM SERPL-SCNC: 138 MMOL/L (ref 135–147)
TRIGL SERPL-MCNC: 192 MG/DL
TSH SERPL DL<=0.05 MIU/L-ACNC: 2.51 UIU/ML (ref 0.45–4.5)
WBC # BLD AUTO: 9.16 THOUSAND/UL (ref 4.31–10.16)

## 2022-07-19 PROCEDURE — 80053 COMPREHEN METABOLIC PANEL: CPT

## 2022-07-19 PROCEDURE — 84443 ASSAY THYROID STIM HORMONE: CPT

## 2022-07-19 PROCEDURE — 36415 COLL VENOUS BLD VENIPUNCTURE: CPT

## 2022-07-19 PROCEDURE — 82306 VITAMIN D 25 HYDROXY: CPT

## 2022-07-19 PROCEDURE — 80061 LIPID PANEL: CPT

## 2022-07-19 PROCEDURE — 85027 COMPLETE CBC AUTOMATED: CPT

## 2022-07-25 ENCOUNTER — OFFICE VISIT (OUTPATIENT)
Dept: INTERNAL MEDICINE CLINIC | Facility: CLINIC | Age: 67
End: 2022-07-25
Payer: MEDICARE

## 2022-07-25 VITALS
WEIGHT: 143.6 LBS | TEMPERATURE: 97.2 F | HEIGHT: 63 IN | HEART RATE: 78 BPM | DIASTOLIC BLOOD PRESSURE: 84 MMHG | OXYGEN SATURATION: 97 % | BODY MASS INDEX: 25.45 KG/M2 | RESPIRATION RATE: 14 BRPM | SYSTOLIC BLOOD PRESSURE: 132 MMHG

## 2022-07-25 DIAGNOSIS — R73.01 IMPAIRED FASTING BLOOD SUGAR: ICD-10-CM

## 2022-07-25 DIAGNOSIS — E04.1 THYROID NODULE: Primary | ICD-10-CM

## 2022-07-25 DIAGNOSIS — F41.9 ANXIETY: ICD-10-CM

## 2022-07-25 DIAGNOSIS — N39.0 RECURRENT UTI: ICD-10-CM

## 2022-07-25 DIAGNOSIS — Z12.11 SCREEN FOR COLON CANCER: ICD-10-CM

## 2022-07-25 DIAGNOSIS — E78.2 MIXED HYPERLIPIDEMIA: ICD-10-CM

## 2022-07-25 DIAGNOSIS — F41.1 GAD (GENERALIZED ANXIETY DISORDER): ICD-10-CM

## 2022-07-25 PROCEDURE — 99214 OFFICE O/P EST MOD 30 MIN: CPT | Performed by: INTERNAL MEDICINE

## 2022-07-25 RX ORDER — DIAZEPAM 5 MG/1
5 TABLET ORAL DAILY PRN
Qty: 30 TABLET | Refills: 0 | Status: SHIPPED | OUTPATIENT
Start: 2022-07-25

## 2022-07-25 RX ORDER — BUSPIRONE HYDROCHLORIDE 5 MG/1
5 TABLET ORAL 2 TIMES DAILY
Qty: 60 TABLET | Refills: 5 | Status: SHIPPED | OUTPATIENT
Start: 2022-07-25

## 2022-07-25 RX ORDER — SULFAMETHOXAZOLE AND TRIMETHOPRIM 800; 160 MG/1; MG/1
TABLET ORAL
Qty: 30 TABLET | Refills: 0 | Status: SHIPPED | OUTPATIENT
Start: 2022-07-25 | End: 2023-08-23

## 2022-07-25 NOTE — PROGRESS NOTES
Assessment/Plan:    Thyroid nodule  Benign biopsy in March   in Feb 2023    Recurrent UTI  Bactrim PRN (post coital)    PROSPER (generalized anxiety disorder)  Only taking Buspar 5mg a day and will try to raise to BID         Problem List Items Addressed This Visit        Endocrine    Thyroid nodule - Primary     Benign biopsy in March   in Feb 2023         Relevant Orders    CBC and Platelet    Comprehensive metabolic panel    TSH, 3rd generation with Free T4 reflex    US thyroid       Genitourinary    Recurrent UTI     Bactrim PRN (post coital)         Relevant Medications    sulfamethoxazole-trimethoprim (BACTRIM DS) 800-160 mg per tablet       Other    Mixed hyperlipidemia    Relevant Orders    Comprehensive metabolic panel    Lipid Panel with Direct LDL reflex    PROSPER (generalized anxiety disorder)     Only taking Buspar 5mg a day and will try to raise to BID         Relevant Medications    diazepam (VALIUM) 5 mg tablet    busPIRone (BUSPAR) 5 mg tablet      Other Visit Diagnoses     Screen for colon cancer        Relevant Orders    Ambulatory referral for colonoscopy    Anxiety        Relevant Medications    busPIRone (BUSPAR) 5 mg tablet    Impaired fasting blood sugar        Relevant Orders    Comprehensive metabolic panel    HEMOGLOBIN A1C W/ EAG ESTIMATION            Subjective:      Patient ID: David Aguilera is a 74 y o  female  HPI  Here for a follow up  Recent labs reviewed and FBS lipids higher  +dietary indiscretions  COVID in June, sill with a little cough    The following portions of the patient's history were reviewed and updated as appropriate: allergies, current medications, past family history, past medical history, past social history, past surgical history and problem list     Review of Systems   Constitutional: Negative for chills, fever and unexpected weight change  Eyes: Positive for visual disturbance (floaters on the right)     Respiratory: Positive for cough (since COVIDin June , mild and improving)  Negative for shortness of breath  Cardiovascular: Negative for chest pain and palpitations  Gastrointestinal: Negative for abdominal pain, constipation and diarrhea  Genitourinary: Negative for difficulty urinating  Musculoskeletal: Positive for back pain (upcoming ablation)  Psychiatric/Behavioral: The patient is nervous/anxious  Objective:      /84   Pulse 78   Temp (!) 97 2 °F (36 2 °C)   Resp 14   Ht 5' 3" (1 6 m)   Wt 65 1 kg (143 lb 9 6 oz)   SpO2 97%   BMI 25 44 kg/m²          Physical Exam  Constitutional:       General: She is not in acute distress  Appearance: She is well-developed  She is not ill-appearing, toxic-appearing or diaphoretic  HENT:      Head: Normocephalic and atraumatic  Eyes:      Conjunctiva/sclera: Conjunctivae normal    Cardiovascular:      Rate and Rhythm: Normal rate and regular rhythm  Heart sounds: Normal heart sounds  No murmur heard  Pulmonary:      Effort: Pulmonary effort is normal  No respiratory distress  Breath sounds: Normal breath sounds  No wheezing or rales  Abdominal:      General: There is no distension  Palpations: Abdomen is soft  There is no mass  Tenderness: There is abdominal tenderness (lower abdomen)  There is no guarding or rebound  Musculoskeletal:      Cervical back: Neck supple  Right lower leg: No edema  Left lower leg: No edema  Skin:     General: Skin is warm and dry  Neurological:      Mental Status: She is alert and oriented to person, place, and time  Psychiatric:         Mood and Affect: Mood normal          Behavior: Behavior normal          Thought Content:  Thought content normal          Judgment: Judgment normal

## 2022-08-08 ENCOUNTER — PREP FOR PROCEDURE (OUTPATIENT)
Dept: GASTROENTEROLOGY | Facility: AMBULARY SURGERY CENTER | Age: 67
End: 2022-08-08

## 2022-08-08 ENCOUNTER — ESTABLISHED COMPREHENSIVE EXAM (OUTPATIENT)
Dept: URBAN - METROPOLITAN AREA CLINIC 6 | Facility: CLINIC | Age: 67
End: 2022-08-08

## 2022-08-08 DIAGNOSIS — Z12.11 SPECIAL SCREENING FOR MALIGNANT NEOPLASMS, COLON: Primary | ICD-10-CM

## 2022-08-08 DIAGNOSIS — H43.811: ICD-10-CM

## 2022-08-08 DIAGNOSIS — H25.813: ICD-10-CM

## 2022-08-08 DIAGNOSIS — N95.2 ATROPHIC VAGINITIS: ICD-10-CM

## 2022-08-08 DIAGNOSIS — Z98.890: ICD-10-CM

## 2022-08-08 DIAGNOSIS — H40.013: ICD-10-CM

## 2022-08-08 PROCEDURE — 92014 COMPRE OPH EXAM EST PT 1/>: CPT

## 2022-08-08 PROCEDURE — 92133 CPTRZD OPH DX IMG PST SGM ON: CPT

## 2022-08-08 PROCEDURE — 92083 EXTENDED VISUAL FIELD XM: CPT

## 2022-08-08 RX ORDER — ESTRADIOL 0.1 MG/G
1 CREAM VAGINAL 2 TIMES WEEKLY
Qty: 42.5 G | Refills: 0 | Status: SHIPPED | OUTPATIENT
Start: 2022-08-08

## 2022-08-08 ASSESSMENT — VISUAL ACUITY
OU_CC: J1+
OD_CC: 20/20
OS_CC: 20/20-1

## 2022-08-08 ASSESSMENT — TONOMETRY
OD_IOP_MMHG: 20
OS_IOP_MMHG: 18

## 2022-08-23 ENCOUNTER — TELEPHONE (OUTPATIENT)
Dept: PAIN MEDICINE | Facility: CLINIC | Age: 67
End: 2022-08-23

## 2022-08-23 NOTE — TELEPHONE ENCOUNTER
Patient called to cancel procedure 8/26 at 1 pm she is sick will call back to reschedule when she feels better

## 2022-08-24 ENCOUNTER — TELEMEDICINE (OUTPATIENT)
Dept: INTERNAL MEDICINE CLINIC | Facility: CLINIC | Age: 67
End: 2022-08-24
Payer: MEDICARE

## 2022-08-24 VITALS
HEART RATE: 90 BPM | SYSTOLIC BLOOD PRESSURE: 144 MMHG | RESPIRATION RATE: 16 BRPM | OXYGEN SATURATION: 96 % | WEIGHT: 145.8 LBS | HEIGHT: 63 IN | TEMPERATURE: 97.8 F | DIASTOLIC BLOOD PRESSURE: 88 MMHG | BODY MASS INDEX: 25.83 KG/M2

## 2022-08-24 DIAGNOSIS — B34.9 VIRAL INFECTION, UNSPECIFIED: Primary | ICD-10-CM

## 2022-08-24 PROCEDURE — U0005 INFEC AGEN DETEC AMPLI PROBE: HCPCS | Performed by: INTERNAL MEDICINE

## 2022-08-24 PROCEDURE — 99213 OFFICE O/P EST LOW 20 MIN: CPT | Performed by: INTERNAL MEDICINE

## 2022-08-24 PROCEDURE — U0003 INFECTIOUS AGENT DETECTION BY NUCLEIC ACID (DNA OR RNA); SEVERE ACUTE RESPIRATORY SYNDROME CORONAVIRUS 2 (SARS-COV-2) (CORONAVIRUS DISEASE [COVID-19]), AMPLIFIED PROBE TECHNIQUE, MAKING USE OF HIGH THROUGHPUT TECHNOLOGIES AS DESCRIBED BY CMS-2020-01-R: HCPCS | Performed by: INTERNAL MEDICINE

## 2022-08-24 NOTE — PROGRESS NOTES
Assessment/Plan:  Suspect viral illness r/o COVID with PCR  If negative, observe symptoms and if no improvement in the next 3 days, will send abx for her  Stay well hydrated  Symptom directed treatment discussed     Problem List Items Addressed This Visit    None     Visit Diagnoses     Viral infection, unspecified    -  Primary    Relevant Orders    COVID Only - Office Collect (Completed)            Subjective:      Patient ID: Praneeth Richards is a 74 y o  female  HPI  Started with cold symptoms 2 days ago  COVID negative at home   +low grade fever sinus congestion runny nose cough headaches swollen glands    The following portions of the patient's history were reviewed and updated as appropriate: allergies, current medications, past family history, past medical history, past social history, past surgical history and problem list     Review of Systems   Constitutional: Positive for appetite change and fever  Negative for fatigue  HENT: Positive for congestion, rhinorrhea and sinus pressure  Negative for sore throat  Respiratory: Positive for cough  Negative for chest tightness and shortness of breath  Gastrointestinal: Negative for diarrhea and vomiting  Musculoskeletal: Negative for myalgias  Neurological: Positive for headaches  Hematological: Positive for adenopathy  Objective:      /88   Pulse 90   Temp 97 8 °F (36 6 °C)   Resp 16   Ht 5' 3" (1 6 m)   Wt 66 1 kg (145 lb 12 8 oz)   SpO2 96%   BMI 25 83 kg/m²          Physical Exam  Constitutional:       General: She is not in acute distress  Appearance: She is well-developed  She is not ill-appearing, toxic-appearing or diaphoretic  HENT:      Head: Normocephalic and atraumatic  Eyes:      Conjunctiva/sclera: Conjunctivae normal    Cardiovascular:      Rate and Rhythm: Normal rate and regular rhythm  Heart sounds: Normal heart sounds  No murmur heard    Pulmonary:      Effort: Pulmonary effort is normal  No respiratory distress  Breath sounds: Normal breath sounds  No wheezing or rales  Musculoskeletal:      Cervical back: Neck supple  Right lower leg: No edema  Left lower leg: No edema  Skin:     General: Skin is warm and dry  Neurological:      Mental Status: She is alert and oriented to person, place, and time  Psychiatric:         Mood and Affect: Mood normal          Behavior: Behavior normal          Thought Content:  Thought content normal          Judgment: Judgment normal

## 2022-08-25 LAB — SARS-COV-2 RNA RESP QL NAA+PROBE: NEGATIVE

## 2022-09-08 ENCOUNTER — HOSPITAL ENCOUNTER (OUTPATIENT)
Dept: RADIOLOGY | Facility: HOSPITAL | Age: 67
Discharge: HOME | End: 2022-09-08
Payer: MEDICARE

## 2022-09-08 DIAGNOSIS — N20.0 KIDNEY STONE: ICD-10-CM

## 2022-09-08 PROCEDURE — 74018 RADEX ABDOMEN 1 VIEW: CPT

## 2022-09-29 ENCOUNTER — DOCUMENTATION (OUTPATIENT)
Dept: GYNECOLOGY | Facility: CLINIC | Age: 67
End: 2022-09-29

## 2022-09-29 NOTE — PROGRESS NOTES
Called [facility]  They have her name on a list to get her in earlier  It has to be approved by designated person in the office  But they will be calling her

## 2022-10-06 ENCOUNTER — TELEPHONE (OUTPATIENT)
Dept: RADIOLOGY | Facility: CLINIC | Age: 67
End: 2022-10-06

## 2022-10-06 ENCOUNTER — HOSPITAL ENCOUNTER (OUTPATIENT)
Dept: RADIOLOGY | Facility: CLINIC | Age: 67
End: 2022-10-06
Payer: MEDICARE

## 2022-10-06 VITALS
SYSTOLIC BLOOD PRESSURE: 142 MMHG | DIASTOLIC BLOOD PRESSURE: 78 MMHG | RESPIRATION RATE: 18 BRPM | OXYGEN SATURATION: 98 % | HEART RATE: 66 BPM

## 2022-10-06 DIAGNOSIS — M47.816 LUMBAR FACET ARTHROPATHY: ICD-10-CM

## 2022-10-06 PROCEDURE — 64635 DESTROY LUMB/SAC FACET JNT: CPT | Performed by: ANESTHESIOLOGY

## 2022-10-06 PROCEDURE — 64636 DESTROY L/S FACET JNT ADDL: CPT | Performed by: ANESTHESIOLOGY

## 2022-10-06 RX ORDER — METHYLPREDNISOLONE ACETATE 80 MG/ML
80 INJECTION, SUSPENSION INTRA-ARTICULAR; INTRALESIONAL; INTRAMUSCULAR; PARENTERAL; SOFT TISSUE ONCE
Status: COMPLETED | OUTPATIENT
Start: 2022-10-06 | End: 2022-10-06

## 2022-10-06 RX ORDER — BUPIVACAINE HCL/PF 2.5 MG/ML
10 VIAL (ML) INJECTION ONCE
Status: COMPLETED | OUTPATIENT
Start: 2022-10-06 | End: 2022-10-06

## 2022-10-06 RX ORDER — LIDOCAINE HYDROCHLORIDE 10 MG/ML
30 INJECTION, SOLUTION EPIDURAL; INFILTRATION; INTRACAUDAL; PERINEURAL ONCE
Status: COMPLETED | OUTPATIENT
Start: 2022-10-06 | End: 2022-10-06

## 2022-10-06 RX ADMIN — BUPIVACAINE HYDROCHLORIDE 3 ML: 2.5 INJECTION, SOLUTION EPIDURAL; INFILTRATION; INTRACAUDAL at 09:48

## 2022-10-06 RX ADMIN — LIDOCAINE HYDROCHLORIDE 16 ML: 10 INJECTION, SOLUTION EPIDURAL; INFILTRATION; INTRACAUDAL at 09:37

## 2022-10-06 RX ADMIN — METHYLPREDNISOLONE ACETATE 20 MG: 80 INJECTION, SUSPENSION INTRA-ARTICULAR; INTRALESIONAL; INTRAMUSCULAR; PARENTERAL; SOFT TISSUE at 09:48

## 2022-10-06 NOTE — DISCHARGE INSTR - LAB
Medial Branch Radiofrequency Ablation     WHAT YOU NEED TO KNOW:   Medial branch radiofrequency ablation (RFA) is a procedure used to treat facet joint pain in your neck, mid back, or lower back  Facet joints are found at the back of each vertebra  A needle electrode is used to send electrical currents to the nerves in your facet joint  The electrical currents create heat that damages the nerve so it cannot send pain signals  ACTIVITY  Do not drive or operate machinery today  No strenuous activity today - bending, lifting, etc   You may shower today, but do not sit in a tub of water  Resume normal activities tomorrow as tolerated  CARE OF THE INJECTION SITE  If you have soreness or pain, apply ice to the area today (20 minutes on/20 minutes off)  Starting tomorrow, you may use warm, moist heat or ice if needed  Notify the [facility] if you have any of the following: redness, drainage, swelling, or fever above 100°F     SPECIAL INSTRUCTIONS  Our office will call you tomorrow for a progress report and make an appointment for a follow up visit in 4 weeks  If you feel a sunburn-like sensation in the area of your procedure, call our office  MEDICATIONS  Continue to take all routine medications  Our office may have instructed you to hold some medications  As no general anesthesia was used in today's procedure, you should not experience any side effects related to anesthesia  If you have a problem specifically related to your procedure, please call our office at [phone]  Problems not related to your procedure should be directed to your primary care physician

## 2022-10-06 NOTE — H&P
History of Present Illness: The patient is a 74 y o  female who presents with complaints of left lower back pain is here today for left L3-5 ablation    Past Medical History:   Diagnosis Date    Allergic     Anxiety     Chronic kidney disease     COVID-19 6/13/2022    Hyperlipidemia     Recurrent UTI     Screening for AAA (abdominal aortic aneurysm) 5/3/2018    fam hx aaa- check screen- last one done 2016- ectatic at 2 7  check labs     Screening for cardiovascular condition 5/3/2018    Tonsillitis        Past Surgical History:   Procedure Laterality Date    CHOLECYSTECTOMY      CHOLECYSTECTOMY OPEN      COLONOSCOPY  2014    HYSTERECTOMY N/A     uterus removed age 25   Jullie Liming HYSTERECTOMY      LEG SURGERY Left     OOPHORECTOMY      SALIVARY GLAND SURGERY Right     excision of parotid tumor/gland    SALPINGOOPHORECTOMY Bilateral     age 48   [address]  3/1/2022         Current Outpatient Medications:     sulfamethoxazole-trimethoprim (BACTRIM DS) 800-160 mg per tablet, 1 dose post intercourse, Disp: 30 tablet, Rfl: 0    busPIRone (BUSPAR) 5 mg tablet, Take 1 tablet (5 mg total) by mouth 2 (two) times a day, Disp: 60 tablet, Rfl: 5    cholecalciferol (VITAMIN D3) 1,000 units tablet, Take 2,000 Units by mouth daily, Disp: , Rfl:     diazepam (VALIUM) 5 mg tablet, Take 1 tablet (5 mg total) by mouth daily as needed for anxiety No driving with this  No alcohol with this   Do not combine with zolpidem, Disp: 30 tablet, Rfl: 0    estradiol (ESTRACE) 0 1 mg/g vaginal cream, Insert 1 g into the vagina 2 (two) times a week, Disp: 42 5 g, Rfl: 0    Multiple Vitamins-Minerals (CENTRUM SILVER ADULT 50+) TABS, Take 1 tablet by mouth daily  , Disp: , Rfl:     Probiotic Product (PROBIOTIC COLON SUPPORT) CAPS, Take 1 capsule by mouth daily  , Disp: , Rfl:     simvastatin (ZOCOR) 40 mg tablet, Take 1 tablet (40 mg total) by mouth daily, Disp: 90 tablet, Rfl: 0    Vaginal Lubricant (REPLENS) GEL, Insert into the vagina (Patient not taking: No sig reported), Disp: , Rfl:     zolpidem (AMBIEN) 10 mg tablet, Take 1 tablet (10 mg total) by mouth daily at bedtime as needed for sleep, Disp: 30 tablet, Rfl: 0    Current Facility-Administered Medications:     bupivacaine (PF) (MARCAINE) 0 25 % injection 10 mL, 10 mL, Perineural, Once, Andres Jordan MD    lidocaine (PF) (XYLOCAINE-MPF) 1 % injection 30 mL, 30 mL, Infiltration, Once, Andres Jordan MD    methylPREDNISolone acetate (DEPO-MEDROL) injection 80 mg, 80 mg, Perineural, Once, Andres Jordan MD    Allergies   Allergen Reactions    Augmentin [Amoxicillin-Pot Clavulanate] GI Intolerance    Macrobid [Nitrofurantoin] GI Intolerance       Physical Exam:   Vitals:    10/06/22 0916   BP: 150/90   Pulse: 78   Resp: 17   SpO2: 97%     General: Awake, Alert, Oriented x 3, Mood and affect appropriate  Respiratory: Respirations even and unlabored  Cardiovascular: Peripheral pulses intact; no edema  Musculoskeletal Exam:  Left lower back pain    ASA Score: 2    Patient/Chart Verification  Patient ID Verified: Verbal  ID Band Applied: No  Consents Confirmed: Procedural, To be obtained in the Pre-Procedure area  Allergies Reviewed: Yes  Anticoag/NSAID held?: NA  Currently on antibiotics?: No    Assessment:   1   Lumbar facet arthropathy        Plan:  Left L3-5 RFA

## 2022-10-07 NOTE — TELEPHONE ENCOUNTER
Please call pt to schedule f/u ov in 4-6 weeks s/p rfa  FYI  Pt says she is having that same pain again when walking  Advised pt to take it easy today, not to walk as much  Advised to continue ice, may use warm compress alternately with it  Advised to take tylenol too  Instructed it will take 4-6 weeks for the full benefit of the procedure  Any recommendations? Thank you

## 2022-10-13 ENCOUNTER — HOSPITAL ENCOUNTER (OUTPATIENT)
Dept: MAMMOGRAPHY | Facility: CLINIC | Age: 67
Discharge: HOME | End: 2022-10-13
Payer: MEDICARE

## 2022-10-13 ENCOUNTER — HOSPITAL ENCOUNTER (OUTPATIENT)
Dept: ULTRASOUND IMAGING | Facility: CLINIC | Age: 67
Discharge: HOME | End: 2022-10-13
Payer: MEDICARE

## 2022-10-13 VITALS — BODY MASS INDEX: 25.69 KG/M2 | HEIGHT: 63 IN | WEIGHT: 145 LBS

## 2022-10-13 DIAGNOSIS — N64.52 NIPPLE DISCHARGE: ICD-10-CM

## 2022-10-13 PROCEDURE — 76642 ULTRASOUND BREAST LIMITED: CPT

## 2022-10-13 PROCEDURE — G0279 TOMOSYNTHESIS, MAMMO: HCPCS

## 2022-10-13 PROCEDURE — 77066 DX MAMMO INCL CAD BI: CPT

## 2022-10-25 ENCOUNTER — TELEPHONE (OUTPATIENT)
Dept: GASTROENTEROLOGY | Facility: CLINIC | Age: 67
End: 2022-10-25

## 2022-10-25 ENCOUNTER — ANNUAL EXAM (OUTPATIENT)
Dept: GYNECOLOGY | Facility: CLINIC | Age: 67
End: 2022-10-25
Payer: MEDICARE

## 2022-10-25 VITALS
DIASTOLIC BLOOD PRESSURE: 98 MMHG | BODY MASS INDEX: 26.19 KG/M2 | HEIGHT: 63 IN | HEART RATE: 82 BPM | WEIGHT: 147.8 LBS | SYSTOLIC BLOOD PRESSURE: 140 MMHG

## 2022-10-25 DIAGNOSIS — Z80.3 FAMILY HISTORY OF BREAST CANCER: ICD-10-CM

## 2022-10-25 DIAGNOSIS — Z01.419 ENCOUNTER FOR GYNECOLOGICAL EXAMINATION WITHOUT ABNORMAL FINDING: Primary | ICD-10-CM

## 2022-10-25 DIAGNOSIS — N95.2 ATROPHIC VAGINITIS: ICD-10-CM

## 2022-10-25 DIAGNOSIS — M85.80 OSTEOPENIA, UNSPECIFIED LOCATION: ICD-10-CM

## 2022-10-25 PROCEDURE — G0101 CA SCREEN;PELVIC/BREAST EXAM: HCPCS | Performed by: OBSTETRICS & GYNECOLOGY

## 2022-10-25 NOTE — TELEPHONE ENCOUNTER
Patients GI provider:  Dr Mirtha Ariza    Number to return call: ([phone])    Reason for call: Pt called to cancel her colonoscopy and she doesn't wish to reschedule  Thank you!     Scheduled procedure/appointment date if applicable: Apt/procedure cancelled 12/12 colonoscopy

## 2022-11-21 ENCOUNTER — OFFICE VISIT (OUTPATIENT)
Dept: PAIN MEDICINE | Facility: CLINIC | Age: 67
End: 2022-11-21

## 2022-11-21 VITALS
WEIGHT: 145 LBS | SYSTOLIC BLOOD PRESSURE: 149 MMHG | DIASTOLIC BLOOD PRESSURE: 95 MMHG | HEART RATE: 72 BPM | BODY MASS INDEX: 25.69 KG/M2

## 2022-11-21 DIAGNOSIS — M51.16 INTERVERTEBRAL DISC DISORDER WITH RADICULOPATHY OF LUMBAR REGION: Primary | ICD-10-CM

## 2022-11-21 NOTE — PROGRESS NOTES
Assessment:  1  Intervertebral disc disorder with radiculopathy of lumbar region        Plan:  Patient had some mild improvement following the radiofrequency ablation but still symptomatic in her lower back towards the left but also on the right radiating to the hip  At this time, I discussed performing a lumbar epidural steroid injection to help reduce swelling inflammation from underlying disc bulging  She would like to proceed and will be scheduled for an upcoming Tuesday or Thursday under fluoroscopic guidance  Complete risks and benefits including bleeding, infection, tissue reaction, nerve injury and allergic reaction were discussed  The approach was demonstrated using models and literature was provided  Verbal and written consent was obtained  My impressions and treatment recommendations were discussed in detail with the patient who verbalized understanding and had no further questions  Discharge instructions were provided  I personally saw and examined the patient and I agree with the above discussed plan of care  Orders Placed This Encounter   Procedures   • FL spine and pain procedure     Standing Status:   Future     Standing Expiration Date:   11/21/2026     Order Specific Question:   Reason for Exam:     Answer:   LESI     Order Specific Question:   Anticoagulant hold needed? Answer:   No     No orders of the defined types were placed in this encounter  History of Present Illness:  Merna Stanley is a 69 y o  female who presents for a follow up office visit in regards to Back Pain  The patient has a history of lumbar spondylosis and is status post left L3-5 ablation on 10/06/2022  She reports some mild improvement in range of motion but is still symptomatic in the lower back more towards the left than the right aggravated with standing and walking as well as with bending  Pain can be moderate-to-severe at times rated 6/10 on numeric rating scale      I have personally reviewed and/or updated the patient's past medical history, past surgical history, family history, social history, current medications, allergies, and vital signs today  Review of Systems   Respiratory: Negative for shortness of breath  Cardiovascular: Negative for chest pain  Gastrointestinal: Negative for constipation, diarrhea, nausea and vomiting  Musculoskeletal: Positive for back pain  Negative for arthralgias, gait problem, joint swelling and myalgias  Skin: Negative for rash  Neurological: Negative for dizziness, seizures and weakness  All other systems reviewed and are negative        Patient Active Problem List   Diagnosis   • Sleep disturbance   • PROSPER (generalized anxiety disorder)   • Atrophic vaginitis   • Chronic left-sided low back pain without sciatica   • Eustachian tube disorder, bilateral   • Insomnia, controlled   • MVP (mitral valve prolapse)   • Nephrolithiasis   • Osteopenia   • Mixed hyperlipidemia   • Thyroid nodule   • Recurrent UTI   • Renal cyst, acquired, left   • Glaucoma suspect of both eyes   • Peripheral nerve entrapment syndrome   • Lumbar spondylosis   • COVID-19       Past Medical History:   Diagnosis Date   • Allergic    • Anxiety    • Chronic kidney disease    • COVID-19 6/13/2022   • Hyperlipidemia    • Recurrent UTI    • Screening for AAA (abdominal aortic aneurysm) 5/3/2018    [facility] aaa- check screen- last one done 2016- ectatic at 2 7  check labs    • Screening for cardiovascular condition 5/3/2018   • Tonsillitis        Past Surgical History:   Procedure Laterality Date   • CHOLECYSTECTOMY     • CHOLECYSTECTOMY OPEN     • COLONOSCOPY  2014   • CYSTOSCOPY  09/30/2022   • HYSTERECTOMY N/A     uterus removed age 25   • HYSTERECTOMY     • LEG SURGERY Left    • OOPHORECTOMY     • RADIOFREQUENCY ABLATION NERVES  10/06/2022   • SALIVARY GLAND SURGERY Right     excision of parotid tumor/gland   • SALPINGOOPHORECTOMY Bilateral     age 48   • TONSILLECTOMY     • US GUIDED THYROID BIOPSY  03/01/2022       Family History   Problem Relation Age of Onset   • Kidney cancer Mother    • Skin cancer Mother    • Kidney nephrosis Mother    • Aneurysm Father         abdominal aortic aneurysm   • Nephrolithiasis Father    • Kidney nephrosis Father    • Breast cancer Sister 47   • Breast cancer Maternal Grandmother 72        approximate age   • Breast cancer Maternal Aunt 76   • Stomach cancer Paternal Uncle    • Hypertension Maternal Grandfather    • Diabetes Maternal Grandfather    • Sudden death Maternal Grandfather         cardiac   • Polycystic ovary syndrome Daughter    • Diabetes Paternal Grandfather    • Coronary artery disease Neg Hx    • Stroke Neg Hx    • Arthritis Neg Hx    • Hyperlipidemia Neg Hx        Social History     Occupational History   • Not on file   Tobacco Use   • Smoking status: Never   • Smokeless tobacco: Never   Vaping Use   • Vaping Use: Never used   Substance and Sexual Activity   • Alcohol use: No   • Drug use: No   • Sexual activity: Yes     Birth control/protection: Post-menopausal       Current Outpatient Medications on File Prior to Visit   Medication Sig   • busPIRone (BUSPAR) 5 mg tablet Take 1 tablet (5 mg total) by mouth 2 (two) times a day   • cholecalciferol (VITAMIN D3) 1,000 units tablet Take 2,000 Units by mouth daily   • diazepam (VALIUM) 5 mg tablet Take 1 tablet (5 mg total) by mouth daily as needed for anxiety No driving with this  No alcohol with this   Do not combine with zolpidem   • estradiol (ESTRACE) 0 1 mg/g vaginal cream Insert 1 g into the vagina 2 (two) times a week   • Multiple Vitamins-Minerals (CENTRUM SILVER ADULT 50+) TABS Take 1 tablet by mouth daily     • Probiotic Product (PROBIOTIC COLON SUPPORT) CAPS Take 1 capsule by mouth daily     • simvastatin (ZOCOR) 40 mg tablet Take 1 tablet (40 mg total) by mouth daily   • sulfamethoxazole-trimethoprim (BACTRIM DS) 800-160 mg per tablet 1 dose post intercourse   • Vaginal Lubricant (REPLENS) GEL Insert into the vagina (Patient not taking: No sig reported)   • zolpidem (AMBIEN) 10 mg tablet Take 1 tablet (10 mg total) by mouth daily at bedtime as needed for sleep     No current facility-administered medications on file prior to visit  Allergies   Allergen Reactions   • Augmentin [Amoxicillin-Pot Clavulanate] GI Intolerance   • Macrobid [Nitrofurantoin] GI Intolerance       Physical Exam:    /95   Pulse 72   Wt 65 8 kg (145 lb)   BMI 25 69 kg/m²     Constitutional:normal, well developed, well nourished, alert, in no distress and non-toxic and no overt pain behavior    Eyes:anicteric  HEENT:grossly intact  Neck:supple, symmetric, trachea midline and no masses   Pulmonary:even and unlabored  Cardiovascular:No edema or pitting edema present  Skin:Normal without rashes or lesions and well hydrated  Psychiatric:Mood and affect appropriate  Neurologic:Cranial Nerves II-XII grossly intact  Musculoskeletal:normal     Lumbar Spine Exam  Appearance:  Normal lordosis  Palpation/Tenderness:  left lumbar paraspinal tenderness  right lumbar paraspinal tenderness  Range of Motion:  Flexion:  Minimally limited  with pain  Extension:  Minimally limited  with pain  Motor Strength:  Left hip flexion:  5/5  Left hip extension:  5/5  Right hip flexion:  5/5  Right hip extension:  5/5  Left knee flexion:  5/5  Left knee extension:  5/5  Right knee flexion:  5/5  Right knee extension:  5/5  Left foot dorsiflexion:  5/5  Left foot plantar flexion:  5/5  Right foot dorsiflexion:  5/5  Right foot plantar flexion:  5/5  Special Tests:  Left Straight Leg Test:  positive  Right Straight Leg Test:  negative    Imaging    MRI LUMBAR SPINE WITHOUT CONTRAST (4/21/2022)     INDICATION: M47 [id number]: Spondylosis without myelopathy or radiculopathy, lumbar region  M46 1: Sacroiliitis, not elsewhere classified      COMPARISON:  MR 9/26/2017     TECHNIQUE:  Sagittal T1, sagittal T2, sagittal inversion recovery, axial T1 and axial T2, coronal T2     IMAGE QUALITY:  Diagnostic     FINDINGS:     VERTEBRAL BODIES:  There are 5 nonrib-bearing lumbar type vertebral bodies  Trace degenerative anterolisthesis of L4 on L5  Stable L2 level hemangioma      SACRUM:  Normal signal within the sacrum  No evidence of insufficiency or stress fracture      DISTAL CORD AND CONUS:  Normal size and signal within the distal cord and conus      PARASPINAL SOFT TISSUES:  Paraspinal soft tissues are unremarkable      LOWER THORACIC DISC SPACES:  Normal disc height and signal   No disc herniation, canal stenosis or foraminal narrowing  Minor, noncompressive bulge T11-T12         LUMBAR DISC SPACES:     L1-L2:  Normal      L2-L3:  Normal      L3-L4:  Normal      L4-L5:  Moderate facet arthrosis, grade 1 anterolisthesis, minor disc bulge but no significant stenosis      L5-S1:  Moderate right greater than left facet arthrosis which is progressed minimally since prior study  Minor bulge

## 2022-12-13 ENCOUNTER — HOSPITAL ENCOUNTER (OUTPATIENT)
Dept: RADIOLOGY | Facility: CLINIC | Age: 67
Discharge: HOME | End: 2022-12-13

## 2022-12-13 VITALS
DIASTOLIC BLOOD PRESSURE: 85 MMHG | RESPIRATION RATE: 18 BRPM | TEMPERATURE: 98.5 F | OXYGEN SATURATION: 97 % | SYSTOLIC BLOOD PRESSURE: 158 MMHG | HEART RATE: 61 BPM

## 2022-12-13 DIAGNOSIS — M51.16 INTERVERTEBRAL DISC DISORDER WITH RADICULOPATHY OF LUMBAR REGION: ICD-10-CM

## 2022-12-13 RX ORDER — BUPIVACAINE HCL/PF 2.5 MG/ML
2 VIAL (ML) INJECTION ONCE
Status: COMPLETED | OUTPATIENT
Start: 2022-12-13 | End: 2022-12-13

## 2022-12-13 RX ORDER — VITAMIN E 268 MG
400 CAPSULE ORAL DAILY
COMMUNITY

## 2022-12-13 RX ORDER — METHYLPREDNISOLONE ACETATE 80 MG/ML
80 INJECTION, SUSPENSION INTRA-ARTICULAR; INTRALESIONAL; INTRAMUSCULAR; PARENTERAL; SOFT TISSUE ONCE
Status: COMPLETED | OUTPATIENT
Start: 2022-12-13 | End: 2022-12-13

## 2022-12-13 RX ADMIN — IOHEXOL 1 ML: 300 INJECTION, SOLUTION INTRAVENOUS at 10:58

## 2022-12-13 RX ADMIN — BUPIVACAINE HYDROCHLORIDE 2 ML: 2.5 INJECTION, SOLUTION EPIDURAL; INFILTRATION; INTRACAUDAL at 10:58

## 2022-12-13 RX ADMIN — METHYLPREDNISOLONE ACETATE 80 MG: 80 INJECTION, SUSPENSION INTRA-ARTICULAR; INTRALESIONAL; INTRAMUSCULAR; PARENTERAL; SOFT TISSUE at 10:58

## 2022-12-13 NOTE — H&P
History of Present Illness: The patient is a 69 y o  female who presents with complaints of lower back pain and is here for an L4 epidural steroid injection    Past Medical History:   Diagnosis Date   • Allergic    • Anxiety    • Chronic kidney disease    • COVID-19 6/13/2022   • Hyperlipidemia    • Recurrent UTI    • Screening for AAA (abdominal aortic aneurysm) 5/3/2018    [facility] check screen- last one done 2016- ectatic at 2 7  check labs    • Screening for cardiovascular condition 5/3/2018   • Tonsillitis        Past Surgical History:   Procedure Laterality Date   • CHOLECYSTECTOMY     • CHOLECYSTECTOMY OPEN     • COLONOSCOPY  2014   • CYSTOSCOPY  09/30/2022   • HYSTERECTOMY N/A     uterus removed age 25   • HYSTERECTOMY     • LEG SURGERY Left    • OOPHORECTOMY     • RADIOFREQUENCY ABLATION NERVES  10/06/2022   • SALIVARY GLAND SURGERY Right     excision of parotid tumor/gland   • SALPINGOOPHORECTOMY Bilateral     age 48   • TONSILLECTOMY     • US GUIDED THYROID BIOPSY  03/01/2022         Current Outpatient Medications:   •  vitamin E, tocopherol, 400 units capsule, Take 400 Units by mouth daily, Disp: , Rfl:   •  busPIRone (BUSPAR) 5 mg tablet, Take 1 tablet (5 mg total) by mouth 2 (two) times a day, Disp: 60 tablet, Rfl: 5  •  cholecalciferol (VITAMIN D3) 1,000 units tablet, Take 2,000 Units by mouth daily, Disp: , Rfl:   •  diazepam (VALIUM) 5 mg tablet, Take 1 tablet (5 mg total) by mouth daily as needed for anxiety No driving with this  No alcohol with this   Do not combine with zolpidem, Disp: 30 tablet, Rfl: 0  •  estradiol (ESTRACE) 0 1 mg/g vaginal cream, Insert 1 g into the vagina 2 (two) times a week, Disp: 42 5 g, Rfl: 0  •  Multiple Vitamins-Minerals (CENTRUM SILVER ADULT 50+) TABS, Take 1 tablet by mouth daily  , Disp: , Rfl:   •  Probiotic Product (PROBIOTIC COLON SUPPORT) CAPS, Take 1 capsule by mouth daily  , Disp: , Rfl:   •  simvastatin (ZOCOR) 40 mg tablet, Take 1 tablet (40 mg total) by mouth daily, Disp: 90 tablet, Rfl: 1  •  sulfamethoxazole-trimethoprim (BACTRIM DS) 800-160 mg per tablet, 1 dose post intercourse, Disp: 30 tablet, Rfl: 0  •  Vaginal Lubricant (REPLENS) GEL, Insert into the vagina (Patient not taking: No sig reported), Disp: , Rfl:   •  zolpidem (AMBIEN) 10 mg tablet, Take 1 tablet (10 mg total) by mouth daily at bedtime as needed for sleep, Disp: 30 tablet, Rfl: 0    Current Facility-Administered Medications:   •  bupivacaine (PF) (MARCAINE) 0 25 % injection 2 mL, 2 mL, Epidural, Once, Ely Lambert MD  •  iohexol (OMNIPAQUE) 300 mg/mL injection 1 mL, 1 mL, Epidural, Once, Ely Lambert MD  •  methylPREDNISolone acetate (DEPO-MEDROL) injection 80 mg, 80 mg, Epidural, Once, Ely Lambert MD    Allergies   Allergen Reactions   • Augmentin [Amoxicillin-Pot Clavulanate] GI Intolerance   • Macrobid [Nitrofurantoin] GI Intolerance       Physical Exam:   Vitals:    12/13/22 1035   BP: 144/85   Pulse:    Resp:    Temp:    SpO2:      General: Awake, Alert, Oriented x 3, Mood and affect appropriate  Respiratory: Respirations even and unlabored  Cardiovascular: Peripheral pulses intact; no edema  Musculoskeletal Exam: Lower back pain    ASA Score: 3    Patient/Chart Verification  Patient ID Verified: Verbal  Consents Confirmed: To be obtained in the Pre-Procedure area  Allergies Reviewed: Yes  Anticoag/NSAID held?: NA  Currently on antibiotics?: No    Assessment:   1   Intervertebral disc disorder with radiculopathy of lumbar region        Plan: GUILLE

## 2022-12-13 NOTE — DISCHARGE INSTR - LAB
Epidural Steroid Injection   WHAT YOU NEED TO KNOW:   An epidural steroid injection (LAURI) is a procedure to inject steroid medicine into the epidural space  The epidural space is between your spinal cord and vertebrae  Steroids reduce inflammation and fluid buildup in your spine that may be causing pain  You may be given pain medicine along with the steroids  ACTIVITY  Do not drive or operate machinery today  No strenuous activity today - bending, lifting, etc   You may resume normal activites starting tomorrow - start slowly and as tolerated  You may shower today, but no tub baths or hot tubs  You may have numbness for several hours from the local anesthetic  Please use caution and common sense, especially with weight-bearing activities  CARE OF THE INJECTION SITE  If you have soreness or pain, apply ice to the area today (20 minutes on/20 minutes off)  Starting tomorrow, you may use warm, moist heat or ice if needed  You may have an increase or change in your discomfort for 36-48 hours after your treatment  Apply ice and continue with any pain medication you have been prescribed  Notify the [facility] if you have any of the following: redness, drainage, swelling, headache, stiff neck or fever above 100°F     SPECIAL INSTRUCTIONS  Our office will contact you in approximately 7 days for a progress report  MEDICATIONS  Continue to take all routine medications  Our office may have instructed you to hold some medications  As no general anesthesia was used in today's procedure, you should not experience any side effects related to anesthesia  If you are diabetic, the steroids used in today's injection may temporarily increase your blood sugar levels after the first few days after your injection  Please keep a close eye on your sugars and alert the doctor who manages your diabetes if your sugars are significantly high from your baseline or you are symptomatic       If you have a problem specifically related to your procedure, please call our office at [phone]  Problems not related to your procedure should be directed to your primary care physician

## 2022-12-20 ENCOUNTER — TELEPHONE (OUTPATIENT)
Dept: PAIN MEDICINE | Facility: CLINIC | Age: 67
End: 2022-12-20

## 2022-12-21 ENCOUNTER — HOSPITAL ENCOUNTER (OUTPATIENT)
Dept: ULTRASOUND IMAGING | Facility: HOSPITAL | Age: 67
Discharge: HOME | End: 2022-12-21

## 2022-12-21 DIAGNOSIS — R31.1 BENIGN ESSENTIAL MICROSCOPIC HEMATURIA: ICD-10-CM

## 2022-12-21 DIAGNOSIS — N20.0 CALCULUS OF KIDNEY: ICD-10-CM

## 2022-12-21 DIAGNOSIS — N28.1 ACQUIRED CYST OF KIDNEY: ICD-10-CM

## 2023-01-03 NOTE — TELEPHONE ENCOUNTER
Patient stated she does not want to schedule a f/u with Connie right now, as she has had some improvement of 40percent.  She will call if she needs a follow up appointment.

## 2023-01-16 ENCOUNTER — APPOINTMENT (OUTPATIENT)
Dept: LAB | Facility: CLINIC | Age: 68
End: 2023-01-16

## 2023-01-16 DIAGNOSIS — E78.2 MIXED HYPERLIPIDEMIA: ICD-10-CM

## 2023-01-16 DIAGNOSIS — E04.1 THYROID NODULE: ICD-10-CM

## 2023-01-16 DIAGNOSIS — R73.01 IMPAIRED FASTING BLOOD SUGAR: ICD-10-CM

## 2023-01-16 LAB
ALBUMIN SERPL BCP-MCNC: 3.8 G/DL (ref 3.5–5)
ALP SERPL-CCNC: 75 U/L (ref 46–116)
ALT SERPL W P-5'-P-CCNC: 27 U/L (ref 12–78)
ANION GAP SERPL CALCULATED.3IONS-SCNC: 8 MMOL/L (ref 4–13)
AST SERPL W P-5'-P-CCNC: 14 U/L (ref 5–45)
BILIRUB SERPL-MCNC: 0.8 MG/DL (ref 0.2–1)
BUN SERPL-MCNC: 23 MG/DL (ref 5–25)
CALCIUM SERPL-MCNC: 9.7 MG/DL (ref 8.3–10.1)
CHLORIDE SERPL-SCNC: 105 MMOL/L (ref 96–108)
CHOLEST SERPL-MCNC: 200 MG/DL
CO2 SERPL-SCNC: 25 MMOL/L (ref 21–32)
CREAT SERPL-MCNC: 0.88 MG/DL (ref 0.6–1.3)
ERYTHROCYTE [DISTWIDTH] IN BLOOD BY AUTOMATED COUNT: 13.7 % (ref 11.6–15.1)
EST. AVERAGE GLUCOSE BLD GHB EST-MCNC: 111 MG/DL
GFR SERPL CREATININE-BSD FRML MDRD: 67 ML/MIN/1.73SQ M
GLUCOSE P FAST SERPL-MCNC: 88 MG/DL (ref 65–99)
HBA1C MFR BLD: 5.5 %
HCT VFR BLD AUTO: 43.6 % (ref 34.8–46.1)
HDLC SERPL-MCNC: 70 MG/DL
HGB BLD-MCNC: 13.7 G/DL (ref 11.5–15.4)
LDLC SERPL CALC-MCNC: 106 MG/DL (ref 0–100)
MCH RBC QN AUTO: 28.8 PG (ref 26.8–34.3)
MCHC RBC AUTO-ENTMCNC: 31.4 G/DL (ref 31.4–37.4)
MCV RBC AUTO: 92 FL (ref 82–98)
PLATELET # BLD AUTO: 313 THOUSANDS/UL (ref 149–390)
PMV BLD AUTO: 10.9 FL (ref 8.9–12.7)
POTASSIUM SERPL-SCNC: 3.8 MMOL/L (ref 3.5–5.3)
PROT SERPL-MCNC: 7.3 G/DL (ref 6.4–8.4)
RBC # BLD AUTO: 4.75 MILLION/UL (ref 3.81–5.12)
SODIUM SERPL-SCNC: 138 MMOL/L (ref 135–147)
TRIGL SERPL-MCNC: 121 MG/DL
TSH SERPL DL<=0.05 MIU/L-ACNC: 2.07 UIU/ML (ref 0.45–4.5)
WBC # BLD AUTO: 8.66 THOUSAND/UL (ref 4.31–10.16)

## 2023-01-26 ENCOUNTER — OFFICE VISIT (OUTPATIENT)
Dept: INTERNAL MEDICINE CLINIC | Facility: CLINIC | Age: 68
End: 2023-01-26

## 2023-01-26 VITALS
BODY MASS INDEX: 25.87 KG/M2 | DIASTOLIC BLOOD PRESSURE: 72 MMHG | WEIGHT: 146 LBS | RESPIRATION RATE: 16 BRPM | SYSTOLIC BLOOD PRESSURE: 122 MMHG | OXYGEN SATURATION: 98 % | HEART RATE: 69 BPM | HEIGHT: 63 IN

## 2023-01-26 DIAGNOSIS — M46.1 SACROILIITIS (HCC): ICD-10-CM

## 2023-01-26 DIAGNOSIS — Z00.00 MEDICARE ANNUAL WELLNESS VISIT, SUBSEQUENT: Primary | ICD-10-CM

## 2023-01-26 DIAGNOSIS — N64.4 BREAST PAIN, LEFT: ICD-10-CM

## 2023-01-26 DIAGNOSIS — E78.01 FAMILIAL HYPERCHOLESTEROLEMIA: ICD-10-CM

## 2023-01-26 DIAGNOSIS — F41.1 GAD (GENERALIZED ANXIETY DISORDER): ICD-10-CM

## 2023-01-26 DIAGNOSIS — M85.88 OSTEOPENIA OF LUMBAR SPINE: ICD-10-CM

## 2023-01-26 DIAGNOSIS — R73.01 IMPAIRED FASTING BLOOD SUGAR: ICD-10-CM

## 2023-01-26 DIAGNOSIS — E04.1 THYROID NODULE: ICD-10-CM

## 2023-01-26 DIAGNOSIS — Z12.31 BREAST CANCER SCREENING BY MAMMOGRAM: ICD-10-CM

## 2023-01-26 DIAGNOSIS — E55.9 VITAMIN D DEFICIENCY: ICD-10-CM

## 2023-01-26 DIAGNOSIS — G47.00 INSOMNIA, CONTROLLED: ICD-10-CM

## 2023-01-26 PROBLEM — U07.1 COVID-19: Status: RESOLVED | Noted: 2022-06-13 | Resolved: 2023-01-26

## 2023-01-26 RX ORDER — SIMVASTATIN 40 MG
40 TABLET ORAL DAILY
Qty: 90 TABLET | Refills: 3 | Status: SHIPPED | OUTPATIENT
Start: 2023-01-26

## 2023-01-26 RX ORDER — ZOLPIDEM TARTRATE 10 MG/1
10 TABLET ORAL
Qty: 30 TABLET | Refills: 0 | Status: SHIPPED | OUTPATIENT
Start: 2023-01-26

## 2023-01-26 RX ORDER — BUSPIRONE HYDROCHLORIDE 5 MG/1
10 TABLET ORAL
Qty: 60 TABLET | Refills: 5
Start: 2023-01-26

## 2023-01-26 RX ORDER — DIAZEPAM 5 MG/1
5 TABLET ORAL DAILY PRN
Qty: 30 TABLET | Refills: 0 | Status: SHIPPED | OUTPATIENT
Start: 2023-01-26

## 2023-01-26 NOTE — ASSESSMENT & PLAN NOTE
Consider switch to atorva-or rosuvastatin from simvastatin  Dietary changes first and if LDL remains >100, will switch

## 2023-01-26 NOTE — PATIENT INSTRUCTIONS
Medicare Preventive Visit Patient Instructions  Thank you for completing your Welcome to Medicare Visit or Medicare Annual Wellness Visit today  Your next wellness visit will be due in one year (1/27/2024)  The screening/preventive services that you may require over the next 5-10 years are detailed below  Some tests may not apply to you based off risk factors and/or age  Screening tests ordered at today's visit but not completed yet may show as past due  Also, please note that scanned in results may not display below  Preventive Screenings:  Service Recommendations Previous Testing/Comments   Colorectal Cancer Screening  * Colonoscopy    * Fecal Occult Blood Test (FOBT)/Fecal Immunochemical Test (FIT)  * Fecal DNA/Cologuard Test  * Flexible Sigmoidoscopy Age: 39-70 years old   Colonoscopy: every 10 years (may be performed more frequently if at higher risk)  OR  FOBT/FIT: every 1 year  OR  Cologuard: every 3 years  OR  Sigmoidoscopy: every 5 years  Screening may be recommended earlier than age 39 if at higher risk for colorectal cancer  Also, an individualized decision between you and your healthcare provider will decide whether screening between the ages of 74-80 would be appropriate  Colonoscopy: 08/27/2015  FOBT/FIT: Not on file  Cologuard: Not on file  Sigmoidoscopy: Not on file          Breast Cancer Screening Age: 36 years old  Frequency: every 1-2 years  Not required if history of left and right mastectomy Mammogram: 10/13/2022        Cervical Cancer Screening Between the ages of 21-29, pap smear recommended once every 3 years  Between the ages of 33-67, can perform pap smear with HPV co-testing every 5 years     Recommendations may differ for women with a history of total hysterectomy, cervical cancer, or abnormal pap smears in past  Pap Smear: 10/25/2022        Hepatitis C Screening Once for adults born between 1945 and 1965  More frequently in patients at high risk for Hepatitis C Hep C Antibody: 06/06/2019        Diabetes Screening 1-2 times per year if you're at risk for diabetes or have pre-diabetes Fasting glucose: 88 mg/dL (1/16/2023)  A1C: 5 5 % (1/16/2023)      Cholesterol Screening Once every 5 years if you don't have a lipid disorder  May order more often based on risk factors  Lipid panel: 01/16/2023          Other Preventive Screenings Covered by Medicare:  1  Abdominal Aortic Aneurysm (AAA) Screening: covered once if your at risk  You're considered to be at risk if you have a family history of AAA  2  Lung Cancer Screening: covers low dose CT scan once per year if you meet all of the following conditions: (1) Age 50-69; (2) No signs or symptoms of lung cancer; (3) Current smoker or have quit smoking within the last 15 years; (4) You have a tobacco smoking history of at least 20 pack years (packs per day multiplied by number of years you smoked); (5) You get a written order from a healthcare provider  3  Glaucoma Screening: covered annually if you're considered high risk: (1) You have diabetes OR (2) Family history of glaucoma OR (3)  aged 48 and older OR (3)  American aged 72 and older  3  Osteoporosis Screening: covered every 2 years if you meet one of the following conditions: (1) You're estrogen deficient and at risk for osteoporosis based off medical history and other findings; (2) Have a vertebral abnormality; (3) On glucocorticoid therapy for more than 3 months; (4) Have primary hyperparathyroidism; (5) On osteoporosis medications and need to assess response to drug therapy  · Last bone density test (DXA Scan): 08/31/2021   5  HIV Screening: covered annually if you're between the age of 15-65  Also covered annually if you are younger than 13 and older than 72 with risk factors for HIV infection  For pregnant patients, it is covered up to 3 times per pregnancy      Immunizations:  Immunization Recommendations   Influenza Vaccine Annual influenza vaccination during flu season is recommended for all persons aged >= 6 months who do not have contraindications   Pneumococcal Vaccine   * Pneumococcal conjugate vaccine = PCV13 (Prevnar 13), PCV15 (Vaxneuvance), PCV20 (Prevnar 20)  * Pneumococcal polysaccharide vaccine = PPSV23 (Pneumovax) Adults 25-60 years old: 1-3 doses may be recommended based on certain risk factors  Adults 72 years old: 1-2 doses may be recommended based off what pneumonia vaccine you previously received   Hepatitis B Vaccine 3 dose series if at intermediate or high risk (ex: diabetes, end stage renal disease, liver disease)   Tetanus (Td) Vaccine - COST NOT COVERED BY MEDICARE PART B Following completion of primary series, a booster dose should be given every 10 years to maintain immunity against tetanus  Td may also be given as tetanus wound prophylaxis  Tdap Vaccine - COST NOT COVERED BY MEDICARE PART B Recommended at least once for all adults  For pregnant patients, recommended with each pregnancy  Shingles Vaccine (Shingrix) - COST NOT COVERED BY MEDICARE PART B  2 shot series recommended in those aged 48 and above     Health Maintenance Due:      Topic Date Due   • Breast Cancer Screening: Mammogram  10/13/2024   • Colorectal Cancer Screening  08/27/2025   • Hepatitis C Screening  Completed     Immunizations Due:      Topic Date Due   • Pneumococcal Vaccine: 65+ Years (1 - PCV) Never done   • COVID-19 Vaccine (4 - Booster for Pfizer series) 01/22/2022   • Influenza Vaccine (1) Never done     Advance Directives   What are advance directives? Advance directives are legal documents that state your wishes and plans for medical care  These plans are made ahead of time in case you lose your ability to make decisions for yourself  Advance directives can apply to any medical decision, such as the treatments you want, and if you want to donate organs  What are the types of advance directives?   There are many types of advance directives, and each state has rules about how to use them  You may choose a combination of any of the following:  · Living will: This is a written record of the treatment you want  You can also choose which treatments you do not want, which to limit, and which to stop at a certain time  This includes surgery, medicine, IV fluid, and tube feedings  · Durable power of  for healthcare [facility]): This is a written record that states who you want to make healthcare choices for you when you are unable to make them for yourself  This person, called a proxy, is usually a family member or a friend  You may choose more than 1 proxy  · Do not resuscitate (DNR) order:  A DNR order is used in case your heart stops beating or you stop breathing  It is a request not to have certain forms of treatment, such as CPR  A DNR order may be included in other types of advance directives  · Medical directive: This covers the care that you want if you are in a coma, near death, or unable to make decisions for yourself  You can list the treatments you want for each condition  Treatment may include pain medicine, surgery, blood transfusions, dialysis, IV or tube feedings, and a ventilator (breathing machine)  · Values history: This document has questions about your views, beliefs, and how you feel and think about life  This information can help others choose the care that you would choose  Why are advance directives important? An advance directive helps you control your care  Although spoken wishes may be used, it is better to have your wishes written down  Spoken wishes can be misunderstood, or not followed  Treatments may be given even if you do not want them  An advance directive may make it easier for your family to make difficult choices about your care     Weight Management   Why it is important to manage your weight:  Being overweight increases your risk of health conditions such as heart disease, high blood pressure, type 2 diabetes, and certain types of cancer  It can also increase your risk for osteoarthritis, sleep apnea, and other respiratory problems  Aim for a slow, steady weight loss  Even a small amount of weight loss can lower your risk of health problems  How to lose weight safely:  A safe and healthy way to lose weight is to eat fewer calories and get regular exercise  You can lose up about 1 pound a week by decreasing the number of calories you eat by 500 calories each day  Healthy meal plan for weight management:  A healthy meal plan includes a variety of foods, contains fewer calories, and helps you stay healthy  A healthy meal plan includes the following:  · Eat whole-grain foods more often  A healthy meal plan should contain fiber  Fiber is the part of grains, fruits, and vegetables that is not broken down by your body  Whole-grain foods are healthy and provide extra fiber in your diet  Some examples of whole-grain foods are whole-wheat breads and pastas, oatmeal, brown rice, and bulgur  · Eat a variety of vegetables every day  Include dark, leafy greens such as spinach, kale, naa greens, and mustard greens  Eat yellow and orange vegetables such as carrots, sweet potatoes, and winter squash  · Eat a variety of fruits every day  Choose fresh or canned fruit (canned in its own juice or light syrup) instead of juice  Fruit juice has very little or no fiber  · Eat low-fat dairy foods  Drink fat-free (skim) milk or 1% milk  Eat fat-free yogurt and low-fat cottage cheese  Try low-fat cheeses such as mozzarella and other reduced-fat cheeses  · Choose meat and other protein foods that are low in fat  Choose beans or other legumes such as split peas or lentils  Choose fish, skinless poultry (chicken or turkey), or lean cuts of red meat (beef or pork)  Before you cook meat or poultry, cut off any visible fat  · Use less fat and oil  Try baking foods instead of frying them   Add less fat, such as margarine, sour cream, regular salad dressing and mayonnaise to foods  Eat fewer high-fat foods  Some examples of high-fat foods include french fries, doughnuts, ice cream, and cakes  · Eat fewer sweets  Limit foods and drinks that are high in sugar  This includes candy, cookies, regular soda, and sweetened drinks  Exercise:  Exercise at least 30 minutes per day on most days of the week  Some examples of exercise include walking, biking, dancing, and swimming  You can also fit in more physical activity by taking the stairs instead of the elevator or parking farther away from stores  Ask your healthcare provider about the best exercise plan for you  © Copyright [employer] 2018 Information is for End User's use only and may not be sold, redistributed or otherwise used for commercial purposes   All illustrations and images included in CareNotes® are the copyrighted property of A D A M , Inc  or [address] [place] [address] [facility]

## 2023-01-26 NOTE — PROGRESS NOTES
Assessment and Plan:   She would like to have a left breast US for persistent pain in the left breast  She has a strong family h/o breast cancer-sister maternal GM and aunt  Last diagnostic mammogram and US recommended screening in a year or breast MRI for persistent symptoms  She declines the MRI and prefers the diagnostic US  Addendum- patient called to go back to routine screening mammo as recommended on last mammogram    Problem List Items Addressed This Visit        Endocrine    Thyroid nodule     US scheduled            Musculoskeletal and Integument    Osteopenia    Relevant Orders    DXA bone density spine hip and pelvis    Vitamin D 25 hydroxy    Sacroiliitis ([address] )     She would like to hold off on further treatment for this            Other    Insomnia, controlled    Relevant Medications    zolpidem (AMBIEN) 10 mg tablet    PROSPER (generalized anxiety disorder)     Consider venlafaxine  She would like to try buspirone 10mg at bedtime         Relevant Medications    diazepam (VALIUM) 5 mg tablet    zolpidem (AMBIEN) 10 mg tablet    busPIRone (BUSPAR) 5 mg tablet    Familial hypercholesterolemia     Consider switch to atorva-or rosuvastatin from simvastatin  Dietary changes first and if LDL remains >100, will switch         Relevant Medications    simvastatin (ZOCOR) 40 mg tablet    Other Relevant Orders    CBC and differential    Comprehensive metabolic panel    Lipid Panel with Direct LDL reflex   Other Visit Diagnoses     Medicare annual wellness visit, subsequent    -  Primary    Impaired fasting blood sugar        Relevant Orders    HEMOGLOBIN A1C W/ EAG ESTIMATION    Breast pain, left        Vitamin D deficiency        Relevant Orders    Vitamin D 25 hydroxy    Breast cancer screening by mammogram        Relevant Orders    Mammo screening bilateral w 3d & cad        BMI Counseling: Body mass index is 25 86 kg/m²   The BMI is above normal  Nutrition recommendations include reducing intake of saturated and trans fat  Exercise recommendations include exercising 3-5 times per week  Rationale for BMI follow-up plan is due to patient being overweight or obese  Depression Screening and Follow-up Plan: Patient was screened for depression during today's encounter  They screened negative with a PHQ-2 score of 0  Preventive health issues were discussed with patient, and age appropriate screening tests were ordered as noted in patient's After Visit Summary  Personalized health advice and appropriate referrals for health education or preventive services given if needed, as noted in patient's After Visit Summary  History of Present Illness:     Patient presents for a Medicare Wellness Visit    HPI   Patient Care Team:  Nicole Alvarenga MD as PCP - General (Internal Medicine)  MD Ayana Enciso MD (Otolaryngology)     Review of Systems:     Review of Systems   Constitutional: Positive for unexpected weight change (weight gain)  Negative for chills and fever  HENT: Negative for rhinorrhea  Eyes: Positive for visual disturbance (floaters)  Respiratory: Positive for cough (occasional mild)  Negative for shortness of breath  Cardiovascular: Negative for chest pain and palpitations  Gastrointestinal: Negative for abdominal pain, constipation and diarrhea  Musculoskeletal: Positive for back pain (left sided)  Neurological: Positive for headaches (occasional)  Negative for dizziness  Psychiatric/Behavioral: Positive for sleep disturbance (takes buspirone PRN, unclear benefit)  The patient is nervous/anxious           Problem List:     Patient Active Problem List   Diagnosis   • Sleep disturbance   • PROSPER (generalized anxiety disorder)   • Atrophic vaginitis   • Chronic left-sided low back pain without sciatica   • Eustachian tube disorder, bilateral   • Insomnia, controlled   • MVP (mitral valve prolapse)   • Nephrolithiasis   • Osteopenia   • Mixed hyperlipidemia   • Thyroid nodule   • Recurrent UTI   • Renal cyst, acquired, left   • Glaucoma suspect of both eyes   • Peripheral nerve entrapment syndrome   • Lumbar spondylosis   • Intervertebral disc disorder with radiculopathy of lumbar region   • Sacroiliitis ([address] )   • Familial hypercholesterolemia      Past Medical and Surgical History:     Past Medical History:   Diagnosis Date   • Allergic    • Anxiety    • Chronic kidney disease    • COVID-19 6/13/2022   • COVID-19 6/13/2022   • Hyperlipidemia    • Recurrent UTI    • Screening for AAA (abdominal aortic aneurysm) 5/3/2018    fam hx aaa- check screen- last one done 2016- ectatic at 2 7  check labs    • Screening for cardiovascular condition 5/3/2018   • Tonsillitis      Past Surgical History:   Procedure Laterality Date   • CHOLECYSTECTOMY     • CHOLECYSTECTOMY OPEN     • COLONOSCOPY  2014   • CYSTOSCOPY  09/30/2022   • HYSTERECTOMY N/A     uterus removed age 25   • HYSTERECTOMY     • LEG SURGERY Left    • OOPHORECTOMY     • RADIOFREQUENCY ABLATION NERVES  10/06/2022   • SALIVARY GLAND SURGERY Right     excision of parotid tumor/gland   • SALPINGOOPHORECTOMY Bilateral     age 48   • TONSILLECTOMY     • US GUIDED THYROID BIOPSY  03/01/2022      Family History:     Family History   Problem Relation Age of Onset   • Kidney cancer Mother    • Skin cancer Mother    • Kidney nephrosis Mother    • Aneurysm Father         abdominal aortic aneurysm   • Nephrolithiasis Father    • Kidney nephrosis Father    • Breast cancer Sister 47   • Breast cancer Maternal Grandmother 72        approximate age   • Breast cancer Maternal Aunt 76   • Stomach cancer Paternal Uncle    • Hypertension Maternal Grandfather    • Diabetes Maternal Grandfather    • Sudden death Maternal Grandfather         cardiac   • Polycystic ovary syndrome Daughter    • Diabetes Paternal Grandfather    • Coronary artery disease Neg Hx    • Stroke Neg Hx    • Arthritis Neg Hx    • Hyperlipidemia Neg Hx       Social History:     Social History     Socioeconomic History   • Marital status: /Civil Union     Spouse name: None   • Number of children: 3   • Years of education: None   • Highest education level: None   Occupational History   • None   Tobacco Use   • Smoking status: Never   • Smokeless tobacco: Never   Vaping Use   • Vaping Use: Never used   Substance and Sexual Activity   • Alcohol use: No   • Drug use: No   • Sexual activity: Yes     Birth control/protection: Post-menopausal   Other Topics Concern   • None   Social History Narrative   • None     Social Determinants of Health     Financial Resource Strain: Low Risk    • Difficulty of Paying Living Expenses: Not hard at all   Food Insecurity: Not on file   Transportation Needs: No Transportation Needs   • Lack of Transportation (Medical): No   • Lack of Transportation (Non-Medical): No   Physical Activity: Not on file   Stress: Not on file   Social Connections: Not on file   Intimate Partner Violence: Not on file   Housing Stability: Not on file      Medications and Allergies:     Current Outpatient Medications   Medication Sig Dispense Refill   • busPIRone (BUSPAR) 5 mg tablet Take 2 tablets (10 mg total) by mouth daily at bedtime 60 tablet 5   • cholecalciferol (VITAMIN D3) 1,000 units tablet Take 2,000 Units by mouth daily     • diazepam (VALIUM) 5 mg tablet Take 1 tablet (5 mg total) by mouth daily as needed for anxiety No driving with this  No alcohol with this   Do not combine with zolpidem 30 tablet 0   • estradiol (ESTRACE) 0 1 mg/g vaginal cream Insert 1 g into the vagina 2 (two) times a week 42 5 g 0   • Multiple Vitamins-Minerals (CENTRUM SILVER ADULT 50+) TABS Take 1 tablet by mouth daily       • Probiotic Product (PROBIOTIC COLON SUPPORT) CAPS Take 1 capsule by mouth daily       • simvastatin (ZOCOR) 40 mg tablet Take 1 tablet (40 mg total) by mouth daily 90 tablet 3   • sulfamethoxazole-trimethoprim (BACTRIM DS) 800-160 mg per tablet 1 dose post intercourse 30 tablet 0   • vitamin E, tocopherol, 400 units capsule Take 400 Units by mouth daily     • zolpidem (AMBIEN) 10 mg tablet Take 1 tablet (10 mg total) by mouth daily at bedtime as needed for sleep 30 tablet 0   • Vaginal Lubricant (REPLENS) GEL Insert into the vagina (Patient not taking: Reported on 6/20/2022)       No current facility-administered medications for this visit  Allergies   Allergen Reactions   • Augmentin [Amoxicillin-Pot Clavulanate] GI Intolerance   • Macrobid [Nitrofurantoin] GI Intolerance      Immunizations:     Immunization History   Administered Date(s) Administered   • COVID-19 PFIZER VACCINE 0 3 ML IM 02/15/2021, 03/07/2021, 11/27/2021      Health Maintenance:         Topic Date Due   • Breast Cancer Screening: Mammogram  10/13/2024   • Colorectal Cancer Screening  08/27/2025   • Hepatitis C Screening  Completed         Topic Date Due   • COVID-19 Vaccine (4 - Booster for Pfizer series) 01/22/2022      Medicare Screening Tests and Risk Assessments:     Kuldip is here for her Subsequent Wellness visit  Health Risk Assessment:   Patient rates overall health as excellent  Patient feels that their physical health rating is same  Patient is very satisfied with their life  Eyesight was rated as slightly worse  Hearing was rated as same  Patient feels that their emotional and mental health rating is same  Patients states they are never, rarely angry  Patient states they are never, rarely unusually tired/fatigued  Pain experienced in the last 7 days has been some  Patient's pain rating has been 3/10  Patient states that she has experienced no weight loss or gain in last 6 months  Fall Risk Screening: In the past year, patient has experienced: no history of falling in past year      Urinary Incontinence Screening:   Patient has not leaked urine accidently in the last six months  Home Safety:  Patient does not have trouble with stairs inside or outside of their home   Patient has working smoke alarms and has working carbon monoxide detector  Home safety hazards include: none  Nutrition:   Current diet is Regular  Medications:   Patient is currently taking over-the-counter supplements  OTC medications include: Probiotics, vitamin D, centrum silver,  Patient is able to manage medications  Activities of Daily Living (ADLs)/Instrumental Activities of Daily Living (IADLs):   Walk and transfer into and out of bed and chair?: Yes  Dress and groom yourself?: Yes    Bathe or shower yourself?: Yes    Feed yourself? Yes  Do your laundry/housekeeping?: Yes  Manage your money, pay your bills and track your expenses?: Yes  Make your own meals?: Yes    Do your own shopping?: Yes    Previous Hospitalizations:   Any hospitalizations or ED visits within the last 12 months?: No      Advance Care Planning:   Living will: No    Durable POA for healthcare: Yes    Advanced directive: No      PREVENTIVE SCREENINGS      Cardiovascular Screening:    General: Screening Not Indicated and History Lipid Disorder      Diabetes Screening:     General: Screening Current      Colorectal Cancer Screening:     General: Screening Current      Breast Cancer Screening:     General: Screening Current      Cervical Cancer Screening:    General: Screening Not Indicated      Lung Cancer Screening:     General: Screening Not Indicated      Hepatitis C Screening:    General: Screening Current    Screening, Brief Intervention, and Referral to Treatment (SBIRT)    Screening  Typical number of drinks in a day: 0  Typical number of drinks in a week: 0  Interpretation: Low risk drinking behavior      AUDIT-C Screenin) How often did you have a drink containing alcohol in the past year? never  2) How many drinks did you have on a typical day when you were drinking in the past year? 0  3) How often did you have 6 or more drinks on one occasion in the past year? never    AUDIT-C Score: 0  Interpretation: Score 0-2 (female): Negative screen for alcohol misuse    Single Item Drug Screening:  How often have you used an illegal drug (including marijuana) or a prescription medication for non-medical reasons in the past year? never    Single Item Drug Screen Score: 0  Interpretation: Negative screen for possible drug use disorder    No results found  Physical Exam:     /72   Pulse 69   Resp 16   Ht 5' 3" (1 6 m)   Wt 66 2 kg (146 lb)   SpO2 98%   BMI 25 86 kg/m²     Physical Exam  Constitutional:       General: She is not in acute distress  Appearance: She is well-developed  She is not ill-appearing, toxic-appearing or diaphoretic  HENT:      Head: Normocephalic and atraumatic  Eyes:      Conjunctiva/sclera: Conjunctivae normal    Cardiovascular:      Rate and Rhythm: Normal rate and regular rhythm  Heart sounds: Normal heart sounds  No murmur heard  Pulmonary:      Effort: Pulmonary effort is normal  No respiratory distress  Breath sounds: Normal breath sounds  No stridor  No wheezing or rales  Abdominal:      General: There is no distension  Palpations: Abdomen is soft  There is no mass  Tenderness: There is no abdominal tenderness  There is no guarding or rebound  Musculoskeletal:      Cervical back: Neck supple  Right lower leg: No edema  Left lower leg: No edema  Neurological:      Mental Status: She is alert and oriented to person, place, and time  Psychiatric:         Mood and Affect: Mood normal          Behavior: Behavior normal          Thought Content:  Thought content normal          Judgment: Judgment normal           Fouzia Chowdhury MD

## 2023-02-16 ENCOUNTER — HOSPITAL ENCOUNTER (OUTPATIENT)
Dept: ULTRASOUND IMAGING | Facility: HOSPITAL | Age: 68
Discharge: HOME | End: 2023-02-16

## 2023-02-16 DIAGNOSIS — E04.1 THYROID NODULE: ICD-10-CM

## 2023-02-22 ENCOUNTER — OFFICE VISIT (OUTPATIENT)
Dept: INTERNAL MEDICINE CLINIC | Facility: CLINIC | Age: 68
End: 2023-02-22

## 2023-02-22 ENCOUNTER — NURSE TRIAGE (OUTPATIENT)
Dept: OTHER | Facility: OTHER | Age: 68
End: 2023-02-22

## 2023-02-22 VITALS
OXYGEN SATURATION: 95 % | BODY MASS INDEX: 25.51 KG/M2 | SYSTOLIC BLOOD PRESSURE: 182 MMHG | DIASTOLIC BLOOD PRESSURE: 104 MMHG | HEART RATE: 91 BPM | TEMPERATURE: 99.9 F | WEIGHT: 144 LBS

## 2023-02-22 DIAGNOSIS — R05.1 ACUTE COUGH: Primary | ICD-10-CM

## 2023-02-22 RX ORDER — AZITHROMYCIN 250 MG/1
250 TABLET, FILM COATED ORAL EVERY 24 HOURS
Qty: 6 TABLET | Refills: 0 | Status: SHIPPED | OUTPATIENT
Start: 2023-02-22 | End: 2023-02-27

## 2023-02-22 NOTE — TELEPHONE ENCOUNTER
Patient has worsening cold symptoms since last week with SOB with exertion  She would like to be seen today in the office  Appointment made for 1400  Patient would like to see Dr Raquel Singh if possible and reports that last time she booked with someone else Dr Raquel Singh switched appointments to see her  She would like a call back of she can be seen by her PCP  Reason for Disposition  • Patient wants to be seen    Answer Assessment - Initial Assessment Questions  1  ONSET: "When did the cough begin?"       Cold symptoms 2/15/23  2  SEVERITY: "How bad is the cough today?"       Moderate   3  SPUTUM: "Describe the color of your sputum" (none, dry cough; clear, white, yellow, green)      Denies   4  HEMOPTYSIS: "Are you coughing up any blood?" If so ask: "How much?" (flecks, streaks, tablespoons, etc )      Denies   5  DIFFICULTY BREATHING: "Are you having difficulty breathing?" If Yes, ask: "How bad is it?" (e g , mild, moderate, severe)     - MILD: No SOB at rest, mild SOB with walking, speaks normally in sentences, can lay down, no retractions, pulse < 100      - MODERATE: SOB at rest, SOB with minimal exertion and prefers to sit, cannot lie down flat, speaks in phrases, mild retractions, audible wheezing, pulse 100-120      - SEVERE: Very SOB at rest, speaks in single words, struggling to breathe, sitting hunched forward, retractions, pulse > 120       SOB with exertion  6  FEVER: "Do you have a fever?" If Yes, ask: "What is your temperature, how was it measured, and when did it start?"      Denies   7  CARDIAC HISTORY: "Do you have any history of heart disease?" (e g , heart attack, congestive heart failure)       Denies   8  LUNG HISTORY: "Do you have any history of lung disease?"  (e g , pulmonary embolus, asthma, emphysema)      Denies   9  PE RISK FACTORS: "Do you have a history of blood clots?" (or: recent major surgery, recent prolonged travel, bedridden)      Denies   10   OTHER SYMPTOMS: "Do you have any other symptoms?" (e g , runny nose, wheezing, chest pain)        Post nasal drip, congestion   11  PREGNANCY: "Is there any chance you are pregnant?" "When was your last menstrual period?"        N/A  12   TRAVEL: "Have you traveled out of the country in the last month?" (e g , travel history, exposures)        Denies    Protocols used: COUGH-ADULT-OH

## 2023-02-22 NOTE — PATIENT INSTRUCTIONS
Problem List Items Addressed This Visit          Other    Acute cough - Primary     Onset of symptoms about a week ago, she did test negative for COVID, did not get the flu shot  At this point no treatment for either of these    We will treat with azithromycin for possible atypical bronchitis, follow-up if not improving patient can take over-the-counter cough medicine like Robitussin-DM or Mucinex DM         Relevant Medications    azithromycin (ZITHROMAX) 250 mg tablet

## 2023-02-22 NOTE — TELEPHONE ENCOUNTER
Regarding: Common cold with cough for several days  ----- Message from Kait Chen sent at 2/22/2023  9:00 AM EST -----  I have a dripping in my throat and got a full blown cold and I feel week  I have a lot of coughing  I took a covid test possibly on 2/19/23 and its negative

## 2023-02-22 NOTE — ASSESSMENT & PLAN NOTE
Onset of symptoms about a week ago, she did test negative for COVID, did not get the flu shot  At this point no treatment for either of these    We will treat with azithromycin for possible atypical bronchitis, follow-up if not improving patient can take over-the-counter cough medicine like Robitussin-DM or Mucinex DM

## 2023-02-22 NOTE — PROGRESS NOTES
Name: Mike Angeles      : 1955      MRN: [id number]  Encounter Provider: Shaka Randhawa MD  Encounter Date: 2023   Encounter department: [facility]     1  Acute cough  Assessment & Plan: Onset of symptoms about a week ago, she did test negative for COVID, did not get the flu shot  At this point no treatment for either of these  We will treat with azithromycin for possible atypical bronchitis, follow-up if not improving patient can take over-the-counter cough medicine like Robitussin-DM or Mucinex DM    Orders:  -     azithromycin (ZITHROMAX) 250 mg tablet; Take 1 tablet (250 mg total) by mouth every 24 hours for 5 days 2 tabs first day, then one tab daily           Subjective     Onset about a week ago feeling sick    Review of Systems   Constitutional: Positive for chills and fever  HENT: Positive for congestion, postnasal drip and sore throat  Respiratory: Positive for cough  Negative for shortness of breath  Cardiovascular: Negative for chest pain  Musculoskeletal: Negative for arthralgias  Neurological: Positive for headaches         Past Medical History:   Diagnosis Date   • Allergic    • Anxiety    • Chronic kidney disease    • COVID-19 2022   • COVID-19 2022   • Hyperlipidemia    • Recurrent UTI    • Screening for AAA (abdominal aortic aneurysm) 5/3/2018    fam hx aaa- check screen- last one done 2016- ectatic at 1 7  check labs    • Screening for cardiovascular condition 5/3/2018   • Tonsillitis      Past Surgical History:   Procedure Laterality Date   • CHOLECYSTECTOMY     • CHOLECYSTECTOMY OPEN     • COLONOSCOPY     • CYSTOSCOPY  2022   • HYSTERECTOMY N/A     uterus removed age 25   • HYSTERECTOMY     • LEG SURGERY Left    • OOPHORECTOMY     • RADIOFREQUENCY ABLATION NERVES  10/06/2022   • SALIVARY GLAND SURGERY Right     excision of parotid tumor/gland   • SALPINGOOPHORECTOMY Bilateral     age 48   • TONSILLECTOMY     • US GUIDED THYROID BIOPSY  03/01/2022     Family History   Problem Relation Age of Onset   • Kidney cancer Mother    • Skin cancer Mother    • Kidney nephrosis Mother    • Aneurysm Father         abdominal aortic aneurysm   • Nephrolithiasis Father    • Kidney nephrosis Father    • Breast cancer Sister 47   • Breast cancer Maternal Grandmother 72        approximate age   • Breast cancer Maternal Aunt 76   • Stomach cancer Paternal Uncle    • Hypertension Maternal Grandfather    • Diabetes Maternal Grandfather    • Sudden death Maternal Grandfather         cardiac   • Polycystic ovary syndrome Daughter    • Diabetes Paternal Grandfather    • Coronary artery disease Neg Hx    • Stroke Neg Hx    • Arthritis Neg Hx    • Hyperlipidemia Neg Hx      Social History     Socioeconomic History   • Marital status: /Civil Union     Spouse name: None   • Number of children: 3   • Years of education: None   • Highest education level: None   Occupational History   • None   Tobacco Use   • Smoking status: Never   • Smokeless tobacco: Never   Vaping Use   • Vaping Use: Never used   Substance and Sexual Activity   • Alcohol use: No   • Drug use: No   • Sexual activity: Yes     Birth control/protection: Post-menopausal   Other Topics Concern   • None   Social History Narrative   • None     Social Determinants of Health     Financial Resource Strain: Low Risk    • Difficulty of Paying Living Expenses: Not hard at all   Food Insecurity: Not on file   Transportation Needs: No Transportation Needs   • Lack of Transportation (Medical): No   • Lack of Transportation (Non-Medical):  No   Physical Activity: Not on file   Stress: Not on file   Social Connections: Not on file   Intimate Partner Violence: Not on file   Housing Stability: Not on file     Current Outpatient Medications on File Prior to Visit   Medication Sig   • busPIRone (BUSPAR) 5 mg tablet Take 2 tablets (10 mg total) by mouth daily at bedtime   • cholecalciferol (VITAMIN D3) 1,000 units tablet Take 2,000 Units by mouth daily   • diazepam (VALIUM) 5 mg tablet Take 1 tablet (5 mg total) by mouth daily as needed for anxiety No driving with this  No alcohol with this  Do not combine with zolpidem   • estradiol (ESTRACE) 0 1 mg/g vaginal cream Insert 1 g into the vagina 2 (two) times a week   • Multiple Vitamins-Minerals (CENTRUM SILVER ADULT 50+) TABS Take 1 tablet by mouth daily     • Probiotic Product (PROBIOTIC COLON SUPPORT) CAPS Take 1 capsule by mouth daily     • simvastatin (ZOCOR) 40 mg tablet Take 1 tablet (40 mg total) by mouth daily   • sulfamethoxazole-trimethoprim (BACTRIM DS) 800-160 mg per tablet 1 dose post intercourse   • Vaginal Lubricant (REPLENS) GEL Insert into the vagina   • zolpidem (AMBIEN) 10 mg tablet Take 1 tablet (10 mg total) by mouth daily at bedtime as needed for sleep   • vitamin E, tocopherol, 400 units capsule Take 400 Units by mouth daily (Patient not taking: Reported on 2/22/2023)     Allergies   Allergen Reactions   • Augmentin [Amoxicillin-Pot Clavulanate] GI Intolerance   • Macrobid [Nitrofurantoin] GI Intolerance     Immunization History   Administered Date(s) Administered   • COVID-19 PFIZER VACCINE 0 3 ML IM 02/15/2021, 03/07/2021, 11/27/2021       Objective     BP (!) 182/104   Pulse 91   Temp 99 9 °F (37 7 °C) (Tympanic)   Wt 65 3 kg (144 lb)   SpO2 95%   BMI 25 51 kg/m²     Physical Exam  Constitutional:       General: She is not in acute distress  Appearance: Normal appearance  HENT:      Mouth/Throat:      Mouth: Mucous membranes are moist       Pharynx: No oropharyngeal exudate or posterior oropharyngeal erythema  Pulmonary:      Effort: Pulmonary effort is normal  No respiratory distress  Breath sounds: No rhonchi  Neurological:      Mental Status: She is alert         Rodney Dumont MD

## 2023-02-26 DIAGNOSIS — E04.1 THYROID NODULE: Primary | ICD-10-CM

## 2023-04-23 PROBLEM — R05.1 ACUTE COUGH: Status: RESOLVED | Noted: 2023-02-22 | Resolved: 2023-04-23

## 2023-04-24 DIAGNOSIS — T75.3XXA SEA SICKNESS, INITIAL ENCOUNTER: Primary | ICD-10-CM

## 2023-04-24 RX ORDER — SCOLOPAMINE TRANSDERMAL SYSTEM 1 MG/1
1 PATCH, EXTENDED RELEASE TRANSDERMAL
Qty: 3 PATCH | Refills: 0 | Status: SHIPPED | OUTPATIENT
Start: 2023-04-24 | End: 2023-07-12

## 2023-05-03 DIAGNOSIS — N95.2 ATROPHIC VAGINITIS: ICD-10-CM

## 2023-05-03 RX ORDER — ESTRADIOL 0.1 MG/G
1 CREAM VAGINAL 2 TIMES WEEKLY
Qty: 42.5 G | Refills: 0 | Status: SHIPPED | OUTPATIENT
Start: 2023-05-04

## 2023-06-28 DIAGNOSIS — N64.3 GALACTORRHEA: ICD-10-CM

## 2023-06-28 DIAGNOSIS — N64.52 NIPPLE DISCHARGE: Primary | ICD-10-CM

## 2023-06-29 ENCOUNTER — APPOINTMENT (OUTPATIENT)
Dept: LAB | Facility: CLINIC | Age: 68
End: 2023-06-29
Payer: MEDICARE

## 2023-06-29 DIAGNOSIS — E55.9 VITAMIN D DEFICIENCY: ICD-10-CM

## 2023-06-29 DIAGNOSIS — R73.01 IMPAIRED FASTING BLOOD SUGAR: ICD-10-CM

## 2023-06-29 DIAGNOSIS — N64.3 GALACTORRHEA: ICD-10-CM

## 2023-06-29 DIAGNOSIS — N64.52 NIPPLE DISCHARGE: ICD-10-CM

## 2023-06-29 DIAGNOSIS — E78.01 FAMILIAL HYPERCHOLESTEROLEMIA: ICD-10-CM

## 2023-06-29 DIAGNOSIS — M85.88 OSTEOPENIA OF LUMBAR SPINE: ICD-10-CM

## 2023-06-29 LAB
25(OH)D3 SERPL-MCNC: 35.6 NG/ML (ref 30–100)
ALBUMIN SERPL BCP-MCNC: 4 G/DL (ref 3.5–5)
ALP SERPL-CCNC: 74 U/L (ref 46–116)
ALT SERPL W P-5'-P-CCNC: 25 U/L (ref 12–78)
ANION GAP SERPL CALCULATED.3IONS-SCNC: 8 MMOL/L
AST SERPL W P-5'-P-CCNC: 18 U/L (ref 5–45)
BASOPHILS # BLD AUTO: 0.04 THOUSANDS/ÂΜL (ref 0–0.1)
BASOPHILS NFR BLD AUTO: 1 % (ref 0–1)
BILIRUB SERPL-MCNC: 0.53 MG/DL (ref 0.2–1)
BUN SERPL-MCNC: 20 MG/DL (ref 5–25)
CALCIUM SERPL-MCNC: 9.8 MG/DL (ref 8.3–10.1)
CHLORIDE SERPL-SCNC: 107 MMOL/L (ref 96–108)
CHOLEST SERPL-MCNC: 182 MG/DL
CO2 SERPL-SCNC: 23 MMOL/L (ref 21–32)
CREAT SERPL-MCNC: 0.9 MG/DL (ref 0.6–1.3)
EOSINOPHIL # BLD AUTO: 0.25 THOUSAND/ÂΜL (ref 0–0.61)
EOSINOPHIL NFR BLD AUTO: 3 % (ref 0–6)
ERYTHROCYTE [DISTWIDTH] IN BLOOD BY AUTOMATED COUNT: 13.2 % (ref 11.6–15.1)
EST. AVERAGE GLUCOSE BLD GHB EST-MCNC: 111 MG/DL
GFR SERPL CREATININE-BSD FRML MDRD: 65 ML/MIN/1.73SQ M
GLUCOSE P FAST SERPL-MCNC: 104 MG/DL (ref 65–99)
HBA1C MFR BLD: 5.5 %
HCT VFR BLD AUTO: 42.5 % (ref 34.8–46.1)
HDLC SERPL-MCNC: 64 MG/DL
HGB BLD-MCNC: 13.8 G/DL (ref 11.5–15.4)
IMM GRANULOCYTES # BLD AUTO: 0.03 THOUSAND/UL (ref 0–0.2)
IMM GRANULOCYTES NFR BLD AUTO: 0 % (ref 0–2)
LDLC SERPL CALC-MCNC: 82 MG/DL (ref 0–100)
LYMPHOCYTES # BLD AUTO: 2.69 THOUSANDS/ÂΜL (ref 0.6–4.47)
LYMPHOCYTES NFR BLD AUTO: 32 % (ref 14–44)
MCH RBC QN AUTO: 29.6 PG (ref 26.8–34.3)
MCHC RBC AUTO-ENTMCNC: 32.5 G/DL (ref 31.4–37.4)
MCV RBC AUTO: 91 FL (ref 82–98)
MONOCYTES # BLD AUTO: 0.69 THOUSAND/ÂΜL (ref 0.17–1.22)
MONOCYTES NFR BLD AUTO: 8 % (ref 4–12)
NEUTROPHILS # BLD AUTO: 4.64 THOUSANDS/ÂΜL (ref 1.85–7.62)
NEUTS SEG NFR BLD AUTO: 56 % (ref 43–75)
NRBC BLD AUTO-RTO: 0 /100 WBCS
PLATELET # BLD AUTO: 310 THOUSANDS/UL (ref 149–390)
PMV BLD AUTO: 10.6 FL (ref 8.9–12.7)
POTASSIUM SERPL-SCNC: 3.7 MMOL/L (ref 3.5–5.3)
PROLACTIN SERPL-MCNC: 6.12 NG/ML (ref 2.74–19.64)
PROT SERPL-MCNC: 7.5 G/DL (ref 6.4–8.4)
RBC # BLD AUTO: 4.67 MILLION/UL (ref 3.81–5.12)
SODIUM SERPL-SCNC: 138 MMOL/L (ref 135–147)
TRIGL SERPL-MCNC: 181 MG/DL
WBC # BLD AUTO: 8.34 THOUSAND/UL (ref 4.31–10.16)

## 2023-06-29 PROCEDURE — 82306 VITAMIN D 25 HYDROXY: CPT

## 2023-06-29 PROCEDURE — 84146 ASSAY OF PROLACTIN: CPT

## 2023-06-29 PROCEDURE — 83036 HEMOGLOBIN GLYCOSYLATED A1C: CPT

## 2023-06-29 PROCEDURE — 36415 COLL VENOUS BLD VENIPUNCTURE: CPT

## 2023-06-29 PROCEDURE — 80061 LIPID PANEL: CPT

## 2023-06-29 PROCEDURE — 80053 COMPREHEN METABOLIC PANEL: CPT

## 2023-06-29 PROCEDURE — 85025 COMPLETE CBC W/AUTO DIFF WBC: CPT

## 2023-07-05 ENCOUNTER — TELEPHONE (OUTPATIENT)
Dept: GYNECOLOGY | Facility: CLINIC | Age: 68
End: 2023-07-05

## 2023-07-05 NOTE — TELEPHONE ENCOUNTER
Tatiana Infante called for results of Prolactin  Pt. States She had leakage from nipples again several days last week.   Please advise

## 2023-07-12 ENCOUNTER — OFFICE VISIT (OUTPATIENT)
Dept: INTERNAL MEDICINE CLINIC | Facility: CLINIC | Age: 68
End: 2023-07-12
Payer: MEDICARE

## 2023-07-12 VITALS
OXYGEN SATURATION: 99 % | HEART RATE: 72 BPM | SYSTOLIC BLOOD PRESSURE: 122 MMHG | WEIGHT: 145.8 LBS | DIASTOLIC BLOOD PRESSURE: 80 MMHG | BODY MASS INDEX: 25.83 KG/M2 | HEIGHT: 63 IN

## 2023-07-12 DIAGNOSIS — E78.2 MIXED HYPERLIPIDEMIA: ICD-10-CM

## 2023-07-12 DIAGNOSIS — N64.52 NIPPLE DISCHARGE: Primary | ICD-10-CM

## 2023-07-12 DIAGNOSIS — F41.1 GAD (GENERALIZED ANXIETY DISORDER): ICD-10-CM

## 2023-07-12 DIAGNOSIS — G47.00 INSOMNIA, CONTROLLED: ICD-10-CM

## 2023-07-12 DIAGNOSIS — E78.01 FAMILIAL HYPERCHOLESTEROLEMIA: ICD-10-CM

## 2023-07-12 DIAGNOSIS — R73.01 IMPAIRED FASTING BLOOD SUGAR: ICD-10-CM

## 2023-07-12 PROCEDURE — 99214 OFFICE O/P EST MOD 30 MIN: CPT | Performed by: INTERNAL MEDICINE

## 2023-07-12 RX ORDER — ZOLPIDEM TARTRATE 10 MG/1
10 TABLET ORAL
Qty: 30 TABLET | Refills: 0 | Status: SHIPPED | OUTPATIENT
Start: 2023-07-12

## 2023-07-12 RX ORDER — DIAZEPAM 5 MG/1
5 TABLET ORAL DAILY PRN
Qty: 30 TABLET | Refills: 0 | Status: SHIPPED | OUTPATIENT
Start: 2023-07-12

## 2023-07-12 NOTE — PROGRESS NOTES
Assessment/Plan:  Advised to see breast surgeon for the right nipple discharge  Continue current meds, may raise buspirone dose       Problem List Items Addressed This Visit        Other    Insomnia, controlled    Relevant Medications    zolpidem (AMBIEN) 10 mg tablet    Mixed hyperlipidemia    Relevant Orders    CBC and differential    Comprehensive metabolic panel    Lipid Panel with Direct LDL reflex    PROSPER (generalized anxiety disorder)    Relevant Medications    diazepam (VALIUM) 5 mg tablet    zolpidem (AMBIEN) 10 mg tablet    Familial hypercholesterolemia   Other Visit Diagnoses     Nipple discharge    -  Primary    Relevant Orders    Ambulatory Referral to Breast Surgery    Impaired fasting blood sugar        Relevant Orders    CBC and differential    Comprehensive metabolic panel    HEMOGLOBIN A1C W/ EAG ESTIMATION            Subjective:      Patient ID: Gwendloyn Kayley is a 69 y.o. female. HPI  Here for a follow up  Acute bronchitis in February but still coughing. It has slowly improved   Coughs when she sings, laughs, lays down  No fever chills post nasal drip chest tightness SOB  Ongoing right nipple discharge  Normal diagnostic mammo and US in October, normal prolactin   She experiences a cold sensation in the right breast and one time saw it was went but very scant  Just started with lower abdominal discomfort 1-2 days ago  No change in bowels, bladder function  Anxiety on buspirone 5mg 1-2 times a day  Infrequently takes Valium or Ambien  Still anxious about COVID but going out more,joined a knotting group    The following portions of the patient's history were reviewed and updated as appropriate: allergies, current medications, past family history, past medical history, past social history, past surgical history and problem list.    Review of Systems   Constitutional: Negative for unexpected weight change. HENT: Negative for sore throat, trouble swallowing and voice change.     Respiratory: Positive for cough. Negative for shortness of breath. Cardiovascular: Negative for chest pain and palpitations. Gastrointestinal: Positive for abdominal pain. Negative for constipation and diarrhea. Genitourinary: Negative for difficulty urinating and dysuria. Psychiatric/Behavioral: The patient is nervous/anxious. Objective:      /80 (BP Location: Left arm, Patient Position: Sitting, Cuff Size: Standard)   Pulse 72   Ht 5' 3" (1.6 m)   Wt 66.1 kg (145 lb 12.8 oz)   SpO2 99%   BMI 25.83 kg/m²          Physical Exam  Constitutional:       General: She is not in acute distress. Appearance: She is well-developed. She is not ill-appearing, toxic-appearing or diaphoretic. Eyes:      Conjunctiva/sclera: Conjunctivae normal.   Cardiovascular:      Rate and Rhythm: Normal rate and regular rhythm. Heart sounds: Normal heart sounds. No murmur heard. Pulmonary:      Effort: Pulmonary effort is normal. No respiratory distress. Breath sounds: Normal breath sounds. No wheezing or rales. Chest:   Breasts:     Right: No mass, nipple discharge, skin change or tenderness. Left: No mass, nipple discharge, skin change or tenderness. Abdominal:      General: There is no distension. Palpations: Abdomen is soft. There is no mass. Tenderness: There is no abdominal tenderness. There is no guarding or rebound. Musculoskeletal:      Cervical back: Neck supple. Right lower leg: No edema. Left lower leg: No edema. Lymphadenopathy:      Upper Body:      Right upper body: No supraclavicular adenopathy. Left upper body: No supraclavicular adenopathy. Neurological:      Mental Status: She is alert and oriented to person, place, and time. Psychiatric:         Mood and Affect: Mood is anxious. Behavior: Behavior normal.         Thought Content:  Thought content normal.         Judgment: Judgment normal.

## 2023-07-17 ENCOUNTER — TELEPHONE (OUTPATIENT)
Dept: HEMATOLOGY ONCOLOGY | Facility: CLINIC | Age: 68
End: 2023-07-17

## 2023-07-17 NOTE — TELEPHONE ENCOUNTER
Patient Call    Who are you speaking with? Patient    If it is not the patient, are they listed on an active communication consent form? N/A   What is the reason for this call? Patient calling to discuss options for seeing an AP for her consult instead of Dr. Greyson Chaves. Patient understands that there are sooner consultations spots available with the AP's and after her consult, she would have the option to see Dr. Greyson Chaves if needed. Patient is going to weigh her options and call back. Does this require a call back? No   If a call back is required, please list best call back number N/A   If a call back is required, advise that a message will be forwarded to their care team and someone will return their call as soon as possible. Did you relay this information to the patient?  N/A

## 2023-07-21 ENCOUNTER — TELEPHONE (OUTPATIENT)
Dept: HEMATOLOGY ONCOLOGY | Facility: CLINIC | Age: 68
End: 2023-07-21

## 2023-07-21 NOTE — TELEPHONE ENCOUNTER
Appointment Confirmation   Who are you speaking with? Patient   If it is not the patient, are they listed on an active communication consent form? N/A   Which provider is the appointment scheduled with? MYLA Hutchins   When is the appointment scheduled? Please list date and time 8/1/23 10:00AM   At which location is the appointment scheduled to take place? [place]   Did caller verbalize understanding of appointment details?  Yes

## 2023-08-01 ENCOUNTER — CONSULT (OUTPATIENT)
Dept: SURGICAL ONCOLOGY | Facility: CLINIC | Age: 68
End: 2023-08-01
Payer: MEDICARE

## 2023-08-01 VITALS
DIASTOLIC BLOOD PRESSURE: 88 MMHG | TEMPERATURE: 97.7 F | BODY MASS INDEX: 26.05 KG/M2 | HEART RATE: 70 BPM | OXYGEN SATURATION: 96 % | SYSTOLIC BLOOD PRESSURE: 158 MMHG | HEIGHT: 63 IN | WEIGHT: 147 LBS

## 2023-08-01 DIAGNOSIS — Z80.3 FAMILY HISTORY OF BREAST CANCER: ICD-10-CM

## 2023-08-01 DIAGNOSIS — Z13.79 ASHKENAZI JEWISH ANCESTRY REQUIRING POPULATION-SPECIFIC SCREENING FOR GENETIC DISORDER: ICD-10-CM

## 2023-08-01 DIAGNOSIS — N64.52 DISCHARGE FROM RIGHT NIPPLE: Primary | ICD-10-CM

## 2023-08-01 DIAGNOSIS — Z91.89 INCREASED RISK OF BREAST CANCER: ICD-10-CM

## 2023-08-01 PROCEDURE — 99204 OFFICE O/P NEW MOD 45 MIN: CPT

## 2023-08-01 NOTE — PROGRESS NOTES
Surgical Oncology Follow Up       [facility]  [address] [place] [address] Road,[address] [place] [address] [id number] [phone]    Keli Hogue  1955  [id number]  [facility] [address] [place] [id number] [phone]    Diagnoses and all orders for this visit:    Discharge from right nipple  -     MRI breast bilateral w and wo contrast w cad; Future    Family history of breast cancer  -     MRI breast bilateral w and wo contrast w cad; Future    Increased risk of breast cancer    Ashkenazi Christianity ancestry requiring population-specific screening for genetic disorder        Chief Complaint   Patient presents with   • Consult       Return for Imaging - See orders. History of Present Illness: The patient presents to the office today in consultation for right breast nipple discharge. She states this began approximately 1 year ago. At first she would notice wet spots in her bra on occasion, but this has become much more frequent lately. She describes the discharge as clear, never bloody. She denies any right breast pain, lumps, masses, tenderness or swelling; she reports chronic pain and discomfort in the outer left breast for many years. She denies starting any new medications. Recent TSH and prolactin levels have been drawn and are within normal range. The patient denies a history of any abnormal mammograms or breast biopsies. Diagnostic mammogram and US were performed in November 2022 with no abnormal findings, and she was recommended to return to regular screening. She reports menarche at age 13, and had her first child at age 23. She underwent hysterectomy at age 25, and then had her tubes and ovaries removed at age 48.   Her family history is significant for breast cancer in her sister at age 48, and in her maternal grandmother and maternal aunt, both in their 62s. Of note, she has Ashkenazi Judaism ancestry. She is not aware of any genetic testing within the family. Review of Systems   Constitutional: Negative for activity change, appetite change, fatigue and unexpected weight change. HENT: Negative. Respiratory: Negative. Cardiovascular: Negative. Gastrointestinal: Negative. Musculoskeletal: Negative. Skin: Negative. Negative for color change, rash and wound. Neurological: Negative. Hematological: Negative. Negative for adenopathy. Psychiatric/Behavioral: The patient is nervous/anxious.               Patient Active Problem List   Diagnosis   • Sleep disturbance   • PROSPER (generalized anxiety disorder)   • Atrophic vaginitis   • Chronic left-sided low back pain without sciatica   • Eustachian tube disorder, bilateral   • Insomnia, controlled   • MVP (mitral valve prolapse)   • Nephrolithiasis   • Osteopenia   • Mixed hyperlipidemia   • Thyroid nodule   • Recurrent UTI   • Renal cyst, acquired, left   • Glaucoma suspect of both eyes   • Peripheral nerve entrapment syndrome   • Lumbar spondylosis   • Intervertebral disc disorder with radiculopathy of lumbar region   • Sacroiliitis ([address])   • Familial hypercholesterolemia     Past Medical History:   Diagnosis Date   • Allergic    • Anxiety    • Chronic kidney disease    • COVID-19 06/13/2022   • COVID-19 06/13/2022   • Headache(784.0)    • Hyperlipidemia    • Recurrent UTI    • Screening for AAA (abdominal aortic aneurysm) 05/03/2018    fam hx aaa- check screen- last one done 2016- ectatic at 2.7  check labs    • Screening for cardiovascular condition 05/03/2018   • Tonsillitis      Past Surgical History:   Procedure Laterality Date   • CHOLECYSTECTOMY     • CHOLECYSTECTOMY OPEN     • COLONOSCOPY  2014   • CYSTOSCOPY  09/30/2022   • HYSTERECTOMY N/A     uterus removed age 25   • HYSTERECTOMY     • LEG SURGERY Left    • OOPHORECTOMY     • RADIOFREQUENCY ABLATION NERVES  10/06/2022   • SALIVARY GLAND SURGERY Right     excision of parotid tumor/gland   • SALPINGOOPHORECTOMY Bilateral     age 48   • TONSILLECTOMY     • US GUIDED THYROID BIOPSY  03/01/2022     Family History   Problem Relation Age of Onset   • Kidney cancer Mother    • Skin cancer Mother    • Kidney nephrosis Mother    • Aneurysm Father         abdominal aortic aneurysm   • Nephrolithiasis Father    • Kidney nephrosis Father    • Breast cancer Sister 48   • Cancer Sister         bone   • Breast cancer Maternal Grandmother         62s   • Hypertension Maternal Grandfather    • Diabetes Maternal Grandfather    • Sudden death Maternal Grandfather         cardiac   • Diabetes Paternal Grandfather    • Hypertension Paternal Grandfather    • Polycystic ovary syndrome Daughter    • Breast cancer Maternal Aunt         62s   • Stomach cancer Paternal Uncle    • Cancer Paternal Uncle    • Coronary artery disease Neg Hx    • Stroke Neg Hx    • Arthritis Neg Hx    • Hyperlipidemia Neg Hx      Social History     Socioeconomic History   • Marital status: /Civil Union     Spouse name: Not on file   • Number of children: 3   • Years of education: Not on file   • Highest education level: Not on file   Occupational History   • Not on file   Tobacco Use   • Smoking status: Never   • Smokeless tobacco: Never   Vaping Use   • Vaping Use: Never used   Substance and Sexual Activity   • Alcohol use: No   • Drug use: No   • Sexual activity: Yes     Partners: Male     Birth control/protection: Post-menopausal   Other Topics Concern   • Not on file   Social History Narrative   • Not on file     Social Determinants of Health     Financial Resource Strain: Low Risk  (1/19/2023)    Overall Financial Resource Strain (CARDIA)    • Difficulty of Paying Living Expenses: Not hard at all   Food Insecurity: Not on file   Transportation Needs: No Transportation Needs (1/19/2023)    PRAPARE - Transportation    • Lack of Transportation (Medical):  No    • Lack of Transportation (Non-Medical): No   Physical Activity: Not on file   Stress: Not on file   Social Connections: Not on file   Intimate Partner Violence: Not on file   Housing Stability: Not on file       Current Outpatient Medications:   •  busPIRone (BUSPAR) 5 mg tablet, Take 2 tablets (10 mg total) by mouth daily at bedtime, Disp: 60 tablet, Rfl: 5  •  cholecalciferol (VITAMIN D3) 1,000 units tablet, Take 2,000 Units by mouth daily, Disp: , Rfl:   •  diazepam (VALIUM) 5 mg tablet, Take 1 tablet (5 mg total) by mouth daily as needed for anxiety No driving with this. No alcohol with this. Do not combine with zolpidem, Disp: 30 tablet, Rfl: 0  •  estradiol (ESTRACE) 0.1 mg/g vaginal cream, Insert 1 g into the vagina 2 (two) times a week, Disp: 42.5 g, Rfl: 0  •  Multiple Vitamins-Minerals (CENTRUM SILVER ADULT 50+) TABS, Take 1 tablet by mouth daily  , Disp: , Rfl:   •  Probiotic Product (PROBIOTIC COLON SUPPORT) CAPS, Take 1 capsule by mouth daily  , Disp: , Rfl:   •  simvastatin (ZOCOR) 40 mg tablet, Take 1 tablet (40 mg total) by mouth daily, Disp: 90 tablet, Rfl: 3  •  sulfamethoxazole-trimethoprim (BACTRIM DS) 800-160 mg per tablet, 1 dose post intercourse, Disp: 30 tablet, Rfl: 0  •  Vaginal Lubricant (REPLENS) GEL, Insert into the vagina, Disp: , Rfl:   •  zolpidem (AMBIEN) 10 mg tablet, Take 1 tablet (10 mg total) by mouth daily at bedtime as needed for sleep, Disp: 30 tablet, Rfl: 0  Allergies   Allergen Reactions   • Augmentin [Amoxicillin-Pot Clavulanate] GI Intolerance   • Macrobid [Nitrofurantoin] GI Intolerance     Vitals:    08/01/23 0952   BP: 158/88   Pulse: 70   Temp: 97.7 °F (36.5 °C)   SpO2: 96%       Physical Exam  Vitals reviewed. Constitutional:       General: She is not in acute distress. Appearance: Normal appearance. She is normal weight. She is not ill-appearing or toxic-appearing. HENT:      Head: Normocephalic and atraumatic.       Nose: Nose normal.      Mouth/Throat:      Mouth: Mucous membranes are moist.   Eyes:      General: No scleral icterus. Conjunctiva/sclera: Conjunctivae normal.   Cardiovascular:      Rate and Rhythm: Normal rate. Pulmonary:      Effort: Pulmonary effort is normal.   Chest:   Breasts:     Right: Tenderness present. No inverted nipple, mass, nipple discharge or skin change. Left: Normal. No inverted nipple, mass, nipple discharge, skin change or tenderness. Comments: Breasts are smooth and symmetric bilaterally. There are no dominant masses, lumps, skin changes. There is mild tenderness in the right nipple. I am unable to express any discharge. Musculoskeletal:         General: Normal range of motion. Cervical back: Normal range of motion and neck supple. Lymphadenopathy:      Cervical: No cervical adenopathy. Upper Body:      Right upper body: No supraclavicular, axillary or pectoral adenopathy. Left upper body: No supraclavicular, axillary or pectoral adenopathy. Skin:     General: Skin is warm and dry. Findings: No erythema. Neurological:      General: No focal deficit present. Mental Status: She is alert and oriented to person, place, and time. Psychiatric:         Mood and Affect: Mood normal.         Behavior: Behavior normal.         Thought Content: Thought content normal.         Judgment: Judgment normal.           Labs:  Collected Updated Procedure    01/16/2023 0738 01/16/2023 1307 TSH, 3rd generation with Free T4 reflex [[id number]]    Blood    Component Value Units   TSH 3RD GENERATON 2.070  uIU/mL          06/29/2023 0757 06/29/2023 1351 Prolactin [[id number]]   Blood    Component Value Units   Prolactin 6.12  ng/mL                Imaging  Mammo diagnostic bilateral w 3d & cad   US BREAST BILATERAL LIMITED (DIAGNOSTIC)  10/13/2022  Narrative & Impression   DIAGNOSIS: Nipple discharge , left breast tenderness     TECHNIQUE:   Ultrasound of the bilateral breast(s) was performed.    Digital diagnostic mammography was performed. Computer Aided Detection (CAD) analyzed all applicable images.       COMPARISONS: Prior breast imaging dated: 08/31/2021, 02/13/2020, 12/06/2018, 11/20/2017, 09/22/2016, 07/09/2015, 03/26/2014, 03/20/2013, 03/07/2012, 03/04/2011, 01/19/2010, 01/16/2009, and 01/07/2008     RELEVANT HISTORY:   Family Breast Cancer History: History of breast cancer in Sister, Maternal Grandmother, Maternal Aunt. Family Medical History: Family medical history includes breast cancer in 3 relatives (maternal aunt, maternal grandmother, sister). Personal History: Hormone history includes birth control and estrogen replacement therapy. Surgical history includes hysterectomy and oophorectomy. No known relevant medical history.     RISK ASSESSMENT:   5 Year Tyrer-Cuzick: 2.56 %  10 Year Tyrer-Cuzick: 5.32 %  Lifetime Tyrer-Cuzick: 8.98 %     TISSUE DENSITY:   There are scattered areas of fibroglandular density.        INDICATION: Jovana Norris is a 69 y.o. female presenting for nipple discharge and due for screening.     FINDINGS:   Bilateral  There are no suspicious masses, grouped microcalcifications or areas of unexplained architectural distortion. The skin and nipple areolar complex are unremarkable. No sonographic abnormality is demonstrated in either breast in the retroareolar region or in the area of left breast pain at 3-4 o'clock. Results were discussed with the patient at the completion of the exam.  Evaluation with MRI breast can be considered if symptoms persist.  IMPRESSION:   No finding to explain patient's symptoms. If symptoms persist follow-up evaluation with MRI can be considered.     ASSESSMENT/BI-RADS CATEGORY:  Left: 2 - Benign  Right: 2 - Benign  Overall: 2 - Benign     RECOMMENDATION:       - Routine screening mammogram in 1 year for both breasts. - Clinical management for both breasts.        I personally reviewed and interpreted the above laboratory and imaging data.      Discussion/Summary: This is a 68-year-old female who presents to the office today for complaints of right nipple discharge. There are no abnormal findings on examination today, and I have a low suspicion for underlying pathology. However considering her significant family history as well as her Ashkenazi Yazdanism ancestry, she is at increased risk for breast cancer. I will send her for breast MRI at this time. I have also discussed genetic testing; the patient is extremely anxious and seems overwhelmed at today's appointment, and her  has suggested that we defer a genetics appointment until after the MRI is completed. I think this is reasonable. I will call the patient with results of her MRI and at that time we will discuss further recommendations. The patient would like to see Dr Brijesh Burleson should surgical discussion be required. In the long-term, I have discussed possibly adding ABUS or MRI as adjunct to her screening mammogram.  I will discuss this with her at her next appointment. She is agreeable to the plan, all questions have been answered.

## 2023-08-10 ENCOUNTER — ESTABLISHED COMPREHENSIVE EXAM (OUTPATIENT)
Dept: URBAN - METROPOLITAN AREA CLINIC 6 | Facility: CLINIC | Age: 68
End: 2023-08-10

## 2023-08-10 DIAGNOSIS — H43.813: ICD-10-CM

## 2023-08-10 DIAGNOSIS — H25.813: ICD-10-CM

## 2023-08-10 DIAGNOSIS — H40.013: ICD-10-CM

## 2023-08-10 DIAGNOSIS — H35.363: ICD-10-CM

## 2023-08-10 PROCEDURE — 92133 CPTRZD OPH DX IMG PST SGM ON: CPT

## 2023-08-10 PROCEDURE — 92083 EXTENDED VISUAL FIELD XM: CPT

## 2023-08-10 PROCEDURE — 92014 COMPRE OPH EXAM EST PT 1/>: CPT

## 2023-08-10 ASSESSMENT — VISUAL ACUITY
OD_CC: 20/20
OS_CC: 20/20

## 2023-08-10 ASSESSMENT — TONOMETRY
OS_IOP_MMHG: 20
OD_IOP_MMHG: 26
OD_IOP_MMHG: 22
OS_IOP_MMHG: 24

## 2023-08-14 DIAGNOSIS — F40.240 CLAUSTROPHOBIA: Primary | ICD-10-CM

## 2023-08-14 RX ORDER — LORAZEPAM 1 MG/1
TABLET ORAL
Qty: 2 TABLET | Refills: 0 | Status: SHIPPED | OUTPATIENT
Start: 2023-08-14

## 2023-09-13 DIAGNOSIS — F41.1 GAD (GENERALIZED ANXIETY DISORDER): ICD-10-CM

## 2023-09-13 RX ORDER — BUSPIRONE HYDROCHLORIDE 5 MG/1
5 TABLET ORAL 2 TIMES DAILY
Qty: 60 TABLET | Refills: 3 | Status: SHIPPED | OUTPATIENT
Start: 2023-09-13

## 2023-09-20 ENCOUNTER — HOSPITAL ENCOUNTER (OUTPATIENT)
Dept: RADIOLOGY | Facility: HOSPITAL | Age: 68
Discharge: HOME | End: 2023-09-20
Payer: MEDICARE

## 2023-09-20 DIAGNOSIS — N64.52 DISCHARGE FROM RIGHT NIPPLE: ICD-10-CM

## 2023-09-20 DIAGNOSIS — Z80.3 FAMILY HISTORY OF BREAST CANCER: ICD-10-CM

## 2023-09-20 PROCEDURE — G1004 CDSM NDSC: HCPCS

## 2023-09-20 PROCEDURE — C8908 MRI W/O FOL W/CONT, BREAST,: HCPCS

## 2023-09-20 PROCEDURE — A9585 GADOBUTROL INJECTION: HCPCS

## 2023-09-20 PROCEDURE — C8937 CAD BREAST MRI: HCPCS

## 2023-09-20 RX ORDER — GADOBUTROL 604.72 MG/ML
6 INJECTION INTRAVENOUS
Status: COMPLETED | OUTPATIENT
Start: 2023-09-20 | End: 2023-09-20

## 2023-09-20 RX ADMIN — GADOBUTROL 6 ML: 604.72 INJECTION INTRAVENOUS at 18:01

## 2023-09-21 ENCOUNTER — TELEPHONE (OUTPATIENT)
Dept: HEMATOLOGY ONCOLOGY | Facility: CLINIC | Age: 68
End: 2023-09-21

## 2023-09-21 NOTE — TELEPHONE ENCOUNTER
Patient Call    Who are you speaking with? Patient    If it is not the patient, are they listed on an active communication consent form? N/A   What is the reason for this call? Pt would like to discuss mri results and next steps   Does this require a call back? Yes   If a call back is required, please list best call back number [phone]   If a call back is required, advise that a message will be forwarded to their care team and someone will return their call as soon as possible. Did you relay this information to the patient?  Yes

## 2023-09-22 DIAGNOSIS — F40.240 CLAUSTROPHOBIA: ICD-10-CM

## 2023-09-22 DIAGNOSIS — R92.8 ABNORMAL MRI, BREAST: Primary | ICD-10-CM

## 2023-09-22 RX ORDER — LORAZEPAM 1 MG/1
TABLET ORAL
Qty: 2 TABLET | Refills: 0 | Status: SHIPPED | OUTPATIENT
Start: 2023-09-22

## 2023-09-26 ENCOUNTER — TELEPHONE (OUTPATIENT)
Dept: SURGICAL ONCOLOGY | Facility: CLINIC | Age: 68
End: 2023-09-26

## 2023-09-26 NOTE — TELEPHONE ENCOUNTER
[facility] with Dr Lina Christie for 11/06/23 in follow up of her 10/20/23 MRI guided biopsy. She was appreciative.

## 2023-09-26 NOTE — TELEPHONE ENCOUNTER
----- Message from Musikki sent at 9/25/2023  2:28 PM EDT -----  Ok great. She is extremely nervous. ----- Message -----  From: Chinedu Marcos MD  Sent: 9/25/2023   1:16 PM EDT  To: Dedrick Orozco RN; Micaela Rene MA; #    The first I have available is a couple 15 min slots the first full week of November. We can give her one of those and then if it comes back cancer, we can switch her to a new cancer spot.   ----- Message -----  From: MYLA Sargent  Sent: 9/22/2023  11:53 AM EDT  To: Dedrick Orozco RN; Chinedu Marcos MD    Pt is scheduled for MRI biopsy on 10/20. Please plan to see her in the office for f/u afterward. She has nothing scheduled at this time.   Thanks

## 2023-09-27 ENCOUNTER — TRANSCRIBE ORDERS (OUTPATIENT)
Dept: ADMINISTRATIVE | Facility: HOSPITAL | Age: 68
End: 2023-09-27

## 2023-09-27 DIAGNOSIS — R31.9 HEMATURIA, UNSPECIFIED TYPE: ICD-10-CM

## 2023-09-27 DIAGNOSIS — R31.1 BENIGN ESSENTIAL MICROSCOPIC HEMATURIA: Primary | ICD-10-CM

## 2023-09-27 DIAGNOSIS — N39.0 URINARY TRACT INFECTION WITHOUT HEMATURIA, SITE UNSPECIFIED: ICD-10-CM

## 2023-09-27 DIAGNOSIS — N20.0 CALCULUS OF KIDNEY: ICD-10-CM

## 2023-09-27 DIAGNOSIS — R31.0 GROSS HEMATURIA: ICD-10-CM

## 2023-10-04 ENCOUNTER — TRANSCRIBE ORDERS (OUTPATIENT)
Dept: LAB | Facility: CLINIC | Age: 68
End: 2023-10-04

## 2023-10-04 ENCOUNTER — APPOINTMENT (OUTPATIENT)
Dept: LAB | Facility: CLINIC | Age: 68
End: 2023-10-04
Payer: MEDICARE

## 2023-10-04 DIAGNOSIS — R31.1 BENIGN ESSENTIAL MICROSCOPIC HEMATURIA: ICD-10-CM

## 2023-10-04 DIAGNOSIS — N20.0 RENAL CALCULI: Primary | ICD-10-CM

## 2023-10-04 DIAGNOSIS — R92.8 ABNORMAL MRI, BREAST: ICD-10-CM

## 2023-10-04 LAB
BACTERIA UR QL AUTO: ABNORMAL /HPF
BILIRUB UR QL STRIP: NEGATIVE
BUN SERPL-MCNC: 23 MG/DL (ref 5–25)
CLARITY UR: CLEAR
COLOR UR: ABNORMAL
CREAT SERPL-MCNC: 0.8 MG/DL (ref 0.6–1.3)
GFR SERPL CREATININE-BSD FRML MDRD: 75 ML/MIN/1.73SQ M
GLUCOSE UR STRIP-MCNC: NEGATIVE MG/DL
HGB UR QL STRIP.AUTO: ABNORMAL
KETONES UR STRIP-MCNC: NEGATIVE MG/DL
LEUKOCYTE ESTERASE UR QL STRIP: ABNORMAL
MUCOUS THREADS UR QL AUTO: ABNORMAL
NITRITE UR QL STRIP: POSITIVE
NON-SQ EPI CELLS URNS QL MICRO: ABNORMAL /HPF
PH UR STRIP.AUTO: 6.5 [PH]
PROT UR STRIP-MCNC: NEGATIVE MG/DL
RBC #/AREA URNS AUTO: ABNORMAL /HPF
SP GR UR STRIP.AUTO: 1.01 (ref 1–1.03)
UROBILINOGEN UR STRIP-ACNC: <2 MG/DL
WBC #/AREA URNS AUTO: ABNORMAL /HPF

## 2023-10-04 PROCEDURE — 36415 COLL VENOUS BLD VENIPUNCTURE: CPT

## 2023-10-04 PROCEDURE — 84520 ASSAY OF UREA NITROGEN: CPT

## 2023-10-04 PROCEDURE — 81001 URINALYSIS AUTO W/SCOPE: CPT

## 2023-10-04 PROCEDURE — 82565 ASSAY OF CREATININE: CPT

## 2023-10-09 ENCOUNTER — APPOINTMENT (OUTPATIENT)
Dept: LAB | Facility: CLINIC | Age: 68
End: 2023-10-09
Payer: MEDICARE

## 2023-10-09 ENCOUNTER — TRANSCRIBE ORDERS (OUTPATIENT)
Dept: LAB | Facility: CLINIC | Age: 68
End: 2023-10-09

## 2023-10-09 DIAGNOSIS — R31.0 GROSS HEMATURIA: ICD-10-CM

## 2023-10-09 DIAGNOSIS — R31.9 HEMATURIA, UNSPECIFIED TYPE: ICD-10-CM

## 2023-10-09 DIAGNOSIS — N20.0 CALCULUS OF KIDNEY: ICD-10-CM

## 2023-10-09 DIAGNOSIS — N39.0 URINARY TRACT INFECTION WITHOUT HEMATURIA, SITE UNSPECIFIED: ICD-10-CM

## 2023-10-09 DIAGNOSIS — R31.1 BENIGN ESSENTIAL MICROSCOPIC HEMATURIA: ICD-10-CM

## 2023-10-09 PROCEDURE — 87186 SC STD MICRODIL/AGAR DIL: CPT

## 2023-10-09 PROCEDURE — 87086 URINE CULTURE/COLONY COUNT: CPT

## 2023-10-09 PROCEDURE — 87077 CULTURE AEROBIC IDENTIFY: CPT

## 2023-10-11 LAB — BACTERIA UR CULT: ABNORMAL

## 2023-10-19 NOTE — PRE-PROCEDURE INSTRUCTIONS
__right___ breast MRI guided biopsy scheduled for __10/20/2023____ @ _1230_____ ( __1115____ arrival)  *pt prescribed Ativan by provider to take prior to MRI BX. .. pt is aware that she must wait to take until after consent form is signed with Radiologist-p's  Nasreen Jiménez will be bringing her*         Questions and concerns were addressed at this time. Patient verbalized understanding. Allergies & medications reviewed with pt & verified in Epic.  pre-procedure instructions reviewed with pt. Implants:  breast/surgical/medical  _x__ NO  ___ YES (type: _____ )       Blood thinners:   _x__ NO  ___ YES/medication: ___ (no need to stop per RBC protocol)          -INR ____ (if pt on Coumadin/Warfarin)    *per pt and -she is a hard stick, her veins roll and she needs a small needle. She had a bad experience with the contrast from the original MRI and they're asking for someone with experience in "hard sticks" to place the IV.

## 2023-10-20 ENCOUNTER — HOSPITAL ENCOUNTER (OUTPATIENT)
Dept: RADIOLOGY | Facility: HOSPITAL | Age: 68
Discharge: HOME | End: 2023-10-20
Payer: MEDICARE

## 2023-10-20 DIAGNOSIS — R92.8 ABNORMAL MRI, BREAST: ICD-10-CM

## 2023-10-20 PROCEDURE — A9585 GADOBUTROL INJECTION: HCPCS

## 2023-10-20 PROCEDURE — A4648 IMPLANTABLE TISSUE MARKER: HCPCS

## 2023-10-20 PROCEDURE — 19085 BX BREAST 1ST LESION MR IMAG: CPT

## 2023-10-20 RX ORDER — LIDOCAINE HYDROCHLORIDE AND EPINEPHRINE BITARTRATE 20; .01 MG/ML; MG/ML
20 INJECTION, SOLUTION SUBCUTANEOUS ONCE
Status: COMPLETED | OUTPATIENT
Start: 2023-10-20 | End: 2023-10-20

## 2023-10-20 RX ORDER — LIDOCAINE HYDROCHLORIDE 10 MG/ML
5 INJECTION, SOLUTION EPIDURAL; INFILTRATION; INTRACAUDAL; PERINEURAL ONCE
Status: COMPLETED | OUTPATIENT
Start: 2023-10-20 | End: 2023-10-20

## 2023-10-20 RX ORDER — GADOBUTROL 604.72 MG/ML
9 INJECTION INTRAVENOUS
Status: COMPLETED | OUTPATIENT
Start: 2023-10-20 | End: 2023-10-20

## 2023-10-20 RX ADMIN — GADOBUTROL 9 ML: 604.72 INJECTION INTRAVENOUS at 13:23

## 2023-10-20 RX ADMIN — LIDOCAINE HYDROCHLORIDE,EPINEPHRINE BITARTRATE 20 ML: 20; .01 INJECTION, SOLUTION INFILTRATION; PERINEURAL at 13:22

## 2023-10-20 RX ADMIN — LIDOCAINE HYDROCHLORIDE 5 ML: 10 INJECTION, SOLUTION EPIDURAL; INFILTRATION; INTRACAUDAL; PERINEURAL at 13:22

## 2023-10-20 RX ADMIN — LIDOCAINE HYDROCHLORIDE 5 ML: 10 INJECTION, SOLUTION EPIDURAL; INFILTRATION; INTRACAUDAL; PERINEURAL at 13:21

## 2023-10-20 NOTE — NURSING NOTE
Patient right breast biopsy at 2 sites done patient tolerated well, no complaints verbalized. After holding pressure to both sites, steri-strips applied and band aides. Post procedure discharge instructions and ice packs given to patient with verbal communication of understanding. Patient left MRI suite ambulating with no complaints, along with .

## 2023-10-23 NOTE — PROGRESS NOTES
Post procedure call completed    Bleeding: _____yes __X___no (pt mentioned bruising & was told that she could use moist heat over the area of bruising to help if needed). Pain: ___x__yes ______no  (taking over the counter pain relievers).     Redness/Swelling: ______yes ___X___no    Band aid removed: __x___yes _____no     Steri-Strips intact: ___X___yes __x___no (pt had 2 site biopsy-steristrips fell off for 1 site with no additional bleeding, intact for the other site & discussed with patient to remove on 10/25/2023 if they have not come off on their own)    Pt with no questions at this time, adv will call when results available, adv to call with any questions or concerns, has name/# for contact

## 2023-10-25 ENCOUNTER — TELEPHONE (OUTPATIENT)
Dept: SURGICAL ONCOLOGY | Facility: CLINIC | Age: 68
End: 2023-10-25

## 2023-10-25 NOTE — TELEPHONE ENCOUNTER
Spoke to patient regarding benign biopsy results. Confirmed appt with Dr Aiden Walters on 11/6 to discuss surgical options. Pt thankful for the call.

## 2023-11-06 ENCOUNTER — OFFICE VISIT (OUTPATIENT)
Dept: SURGICAL ONCOLOGY | Facility: CLINIC | Age: 68
End: 2023-11-06
Payer: MEDICARE

## 2023-11-06 VITALS
BODY MASS INDEX: 26.47 KG/M2 | SYSTOLIC BLOOD PRESSURE: 142 MMHG | HEIGHT: 63 IN | OXYGEN SATURATION: 96 % | HEART RATE: 78 BPM | TEMPERATURE: 98 F | DIASTOLIC BLOOD PRESSURE: 92 MMHG | WEIGHT: 149.4 LBS

## 2023-11-06 DIAGNOSIS — R92.8 ABNORMAL MRI, BREAST: ICD-10-CM

## 2023-11-06 DIAGNOSIS — N60.11 FIBROCYSTIC BREAST CHANGES, RIGHT: ICD-10-CM

## 2023-11-06 DIAGNOSIS — Z13.79 ASHKENAZI JEWISH ANCESTRY REQUIRING POPULATION-SPECIFIC GENETIC SCREENING: ICD-10-CM

## 2023-11-06 DIAGNOSIS — Z80.3 FAMILY HISTORY OF BREAST CANCER: ICD-10-CM

## 2023-11-06 DIAGNOSIS — N64.52 DISCHARGE FROM RIGHT NIPPLE: Primary | ICD-10-CM

## 2023-11-06 DIAGNOSIS — Z12.31 SCREENING MAMMOGRAM FOR BREAST CANCER: ICD-10-CM

## 2023-11-06 PROCEDURE — 99214 OFFICE O/P EST MOD 30 MIN: CPT | Performed by: SURGERY

## 2023-11-06 RX ORDER — SULFAMETHOXAZOLE AND TRIMETHOPRIM 800; 160 MG/1; MG/1
1 TABLET ORAL 2 TIMES DAILY
COMMUNITY
Start: 2023-10-12

## 2023-11-06 NOTE — PROGRESS NOTES
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 Oncology History:    Oncology History    No history exists. History of Present Illness: Patient is here today to discuss potential surgery for her right-sided nipple discharge, she states that she has not noticed this recently, her  points out that when she has drainage it is occasional and very scant in nature, denies any bloody nipple discharge  -Interval History: MRI guided biopsies    Review of Systems:  Review of Systems   Constitutional: Negative. Negative for appetite change, fever and unexpected weight change. HENT: Negative. Negative for trouble swallowing. Eyes: Negative. Respiratory: Negative. Negative for cough and shortness of breath. Cardiovascular: Negative. Negative for chest pain. Gastrointestinal: Negative. Negative for abdominal pain, nausea and vomiting. Endocrine: Negative. Genitourinary: Negative. Negative for dysuria. Musculoskeletal: Negative. Negative for arthralgias and myalgias. Skin: Negative. Allergic/Immunologic: Negative. Neurological: Negative. Negative for headaches. Hematological: Negative. Negative for adenopathy. Does not bruise/bleed easily. Psychiatric/Behavioral: Negative.          Patient Active Problem List   Diagnosis    Sleep disturbance    PROSPER (generalized anxiety disorder)    Atrophic vaginitis    Chronic left-sided low back pain without sciatica    Eustachian tube disorder, bilateral    Insomnia, controlled    MVP (mitral valve prolapse)    Nephrolithiasis    Osteopenia    Mixed hyperlipidemia    Thyroid nodule    Recurrent UTI    Renal cyst, acquired, left    Glaucoma suspect of both eyes    Peripheral nerve entrapment syndrome    Lumbar spondylosis    Intervertebral disc disorder with radiculopathy of lumbar region    Sacroiliitis ([address])    Familial hypercholesterolemia    Discharge from right nipple    Family history of breast cancer    Ashkenazi Yazdanism ancestry requiring population-specific genetic screening    Screening mammogram for breast cancer    Abnormal MRI, breast    Fibrocystic breast changes, right     Past Medical History:   Diagnosis Date    Allergic     Anxiety     Chronic kidney disease     COVID-19 06/13/2022    COVID-19 06/13/2022    Headache(784.0)     Hyperlipidemia     Recurrent UTI     Screening for AAA (abdominal aortic aneurysm) 05/03/2018    fam hx aaa- check screen- last one done 2016- ectatic at 2.7  check labs     Screening for cardiovascular condition 05/03/2018    Tonsillitis      Past Surgical History:   Procedure Laterality Date    CHOLECYSTECTOMY      CHOLECYSTECTOMY OPEN      COLONOSCOPY  2014    CYSTOSCOPY  09/30/2022    HYSTERECTOMY N/A     uterus removed age 25    HYSTERECTOMY      LEG SURGERY Left     MRI BREAST BIOPSY RIGHT (ALL INCLUSIVE) Right 10/20/2023    OOPHORECTOMY      RADIOFREQUENCY ABLATION NERVES  10/06/2022    SALIVARY GLAND SURGERY Right     excision of parotid tumor/gland    SALPINGOOPHORECTOMY Bilateral     age 48    [address] THYROID BIOPSY  03/01/2022     Family History   Problem Relation Age of Onset    Kidney cancer Mother     Skin cancer Mother     Kidney nephrosis Mother     Aneurysm Father         abdominal aortic aneurysm    Nephrolithiasis Father     Kidney nephrosis Father     Breast cancer Sister 48    Cancer Sister         bone    Breast cancer Maternal Grandmother         62s    Hypertension Maternal Grandfather     Diabetes Maternal Grandfather     Sudden death Maternal Grandfather         cardiac    Diabetes Paternal Grandfather     Hypertension Paternal Grandfather     Polycystic ovary syndrome Daughter     Breast cancer Maternal Aunt         62s    Stomach cancer Paternal Uncle     Cancer Paternal Uncle     Coronary artery disease Neg Hx     Stroke Neg Hx     Arthritis Neg Hx     Hyperlipidemia Neg Hx      Social History     Socioeconomic History    Marital status: /Civil Union     Spouse name: Not on file    Number of children: 3    Years of education: Not on file    Highest education level: Not on file   Occupational History    Not on file   Tobacco Use    Smoking status: Never    Smokeless tobacco: Never   Vaping Use    Vaping Use: Never used   Substance and Sexual Activity    Alcohol use: No    Drug use: No    Sexual activity: Yes     Partners: Male     Birth control/protection: Post-menopausal   Other Topics Concern    Not on file   Social History Narrative    Not on file     Social Determinants of Health     Financial Resource Strain: Low Risk  (1/19/2023)    Overall Financial Resource Strain (CARDIA)     Difficulty of Paying Living Expenses: Not hard at all   Food Insecurity: Not on file   Transportation Needs: No Transportation Needs (1/19/2023)    PRAPARE - Transportation     Lack of Transportation (Medical): No     Lack of Transportation (Non-Medical): No   Physical Activity: Not on file   Stress: Not on file   Social Connections: Not on file   Intimate Partner Violence: Not on file   Housing Stability: Not on file       Current Outpatient Medications:     busPIRone (BUSPAR) 5 mg tablet, Take 1 tablet (5 mg total) by mouth 2 (two) times a day, Disp: 60 tablet, Rfl: 3    cholecalciferol (VITAMIN D3) 1,000 units tablet, Take 2,000 Units by mouth daily, Disp: , Rfl:     diazepam (VALIUM) 5 mg tablet, Take 1 tablet (5 mg total) by mouth daily as needed for anxiety No driving with this. No alcohol with this.  Do not combine with zolpidem, Disp: 30 tablet, Rfl: 0    estradiol (ESTRACE) 0.1 mg/g vaginal cream, Insert 1 g into the vagina 2 (two) times a week, Disp: 42.5 g, Rfl: 0    Multiple Vitamins-Minerals (CENTRUM SILVER ADULT 50+) TABS, Take 1 tablet by mouth daily  , Disp: , Rfl:     Probiotic Product (PROBIOTIC COLON SUPPORT) CAPS, Take 1 capsule by mouth daily  , Disp: , Rfl:     simvastatin (ZOCOR) 40 mg tablet, Take 1 tablet (40 mg total) by mouth daily, Disp: 90 tablet, Rfl: 3    Vaginal Lubricant (REPLENS) GEL, Insert into the vagina, Disp: , Rfl:     zolpidem (AMBIEN) 10 mg tablet, Take 1 tablet (10 mg total) by mouth daily at bedtime as needed for sleep, Disp: 30 tablet, Rfl: 0    sulfamethoxazole-trimethoprim (BACTRIM DS) 800-160 mg per tablet, Take 1 tablet by mouth 2 (two) times a day (Patient not taking: Reported on 11/6/2023), Disp: , Rfl:   Allergies   Allergen Reactions    Augmentin [Amoxicillin-Pot Clavulanate] GI Intolerance    Macrobid [Nitrofurantoin] GI Intolerance       The following portions of the patient's history were reviewed and updated as appropriate: allergies, current medications, past family history, past medical history, past social history, past surgical history, and problem list.        Vitals:    11/06/23 1533   BP: 142/92   Pulse: 78   Temp: 98 °F (36.7 °C)   SpO2: 96%       Physical Exam  Constitutional: General: She is not in acute distress. Appearance: Normal appearance. She is well-developed. HENT:      Head: Normocephalic and atraumatic. Chest:   Breasts:     Right: Skin change (Resolving ecchymosis from recent biopsies) and tenderness present. No swelling, bleeding, inverted nipple, mass or nipple discharge (No nipple discharge appreciated today). Left: No swelling, bleeding, inverted nipple, mass, nipple discharge, skin change or tenderness. Lymphadenopathy:      Upper Body:      Right upper body: No supraclavicular, axillary or pectoral adenopathy. Left upper body: No supraclavicular, axillary or pectoral adenopathy. Neurological:      Mental Status: She is alert and oriented to person, place, and time. Psychiatric:         Mood and Affect: Mood normal.           Results:  Labs:  10/20/2023 biopsy right breast x2 revealed benign tissue with fibrocystic changes    Imaging  10/20/2023 MRI guided biopsy concordant with her imaging    9/20/2023 bilateral breast MRI site was benign, non-mass enhancement was noted retroareolar right breast primarily lower inner for which a 2 site biopsy was recommended as noted above    10/13/2022 bilateral 3D diagnostic mammogram and ultrasound was benign    I reviewed the above laboratory and imaging data. Discussion/Summary: 64-year-old female who has intermittent scant clear discharge from the right nipple. Prior diagnostic imaging last year was benign. She was sent for a breast MRI which showed non-mass enhancement. A 2 site biopsy revealed benign fibrocystic changes which was concordant with her imaging. I do not appreciate any nipple discharge on exam today. There is therefore no indication for surgical management. I discussed her family history with her as well as the Ashkenazi Jehovah's witness ancestry. She is agreeable to having genetic testing. I will order this for her.   She would like to push her mammogram out secondary to being sore from the biopsies. I will push this out to March which will be 6 months off of the MRI. We will plan to see her again in 6 months for another clinical exam at the [facility].

## 2023-11-08 ENCOUNTER — TELEPHONE (OUTPATIENT)
Dept: HEMATOLOGY ONCOLOGY | Facility: CLINIC | Age: 68
End: 2023-11-08

## 2023-11-08 NOTE — TELEPHONE ENCOUNTER
I spoke with Margaret Rae to schedule their consultation with [facility]. Scheduling Outcome: Patient is scheduled for an appointment on 5/2/23  at 2pm with Marylee Gelineau    Personal/Family History Related to Appointment:     Personal History of Cancer: Patient reports no personal history of cancer    Family History of Cancer: Patient reports family history of sister-breast;maternal aunt-breast; maternal-breast;    Is patient of [address] [place] [id number]?: Yes    History of Genetic Testing:  Personal History of Genetic Testing: Patient report no personal history of Genetic Testing. Family History of Genetic Testing: Patient reports that no family members have had Genetic Testing. Progeny:  Is patient able to complete our family history questionnaire on a computer?:  Yes    Patient's preferred e-mail address: [email or web address]

## 2023-11-08 NOTE — TELEPHONE ENCOUNTER
I called Gissel Juan in response to a referral that was received for patient to establish care with [facility]. Outreach was made to schedule a consultation. Patient will call back to schedule. The referral has been closed.

## 2023-11-21 ENCOUNTER — HOSPITAL ENCOUNTER (OUTPATIENT)
Dept: RADIOLOGY | Age: 68
Discharge: HOME | End: 2023-11-21
Payer: MEDICARE

## 2023-11-21 DIAGNOSIS — M85.88 OSTEOPENIA OF LUMBAR SPINE: ICD-10-CM

## 2023-11-21 PROCEDURE — 77080 DXA BONE DENSITY AXIAL: CPT

## 2023-12-10 ENCOUNTER — HOSPITAL ENCOUNTER (OUTPATIENT)
Dept: RADIOLOGY | Facility: HOSPITAL | Age: 68
Discharge: HOME | End: 2023-12-10
Payer: MEDICARE

## 2023-12-10 ENCOUNTER — HOSPITAL ENCOUNTER (OUTPATIENT)
Dept: ULTRASOUND IMAGING | Facility: HOSPITAL | Age: 68
Discharge: HOME | End: 2023-12-10
Payer: MEDICARE

## 2023-12-10 DIAGNOSIS — N20.0 CALCULUS OF KIDNEY: ICD-10-CM

## 2023-12-10 DIAGNOSIS — R31.9 HEMATURIA, UNSPECIFIED TYPE: ICD-10-CM

## 2023-12-10 DIAGNOSIS — N20.0 URIC ACID NEPHROLITHIASIS: ICD-10-CM

## 2023-12-10 DIAGNOSIS — R31.1 BENIGN ESSENTIAL MICROSCOPIC HEMATURIA: ICD-10-CM

## 2023-12-10 PROCEDURE — 76770 US EXAM ABDO BACK WALL COMP: CPT

## 2023-12-10 PROCEDURE — 74018 RADEX ABDOMEN 1 VIEW: CPT

## 2024-01-05 PROBLEM — Z13.79 ASHKENAZI JEWISH ANCESTRY REQUIRING POPULATION-SPECIFIC GENETIC SCREENING: Status: RESOLVED | Noted: 2023-11-06 | Resolved: 2024-01-05

## 2024-01-23 ENCOUNTER — APPOINTMENT (OUTPATIENT)
Dept: LAB | Facility: CLINIC | Age: 69
End: 2024-01-23
Payer: MEDICARE

## 2024-01-23 ENCOUNTER — RA CDI HCC (OUTPATIENT)
Dept: OTHER | Facility: HOSPITAL | Age: 69
End: 2024-01-23

## 2024-01-23 DIAGNOSIS — R73.01 IMPAIRED FASTING BLOOD SUGAR: ICD-10-CM

## 2024-01-23 DIAGNOSIS — E78.2 MIXED HYPERLIPIDEMIA: ICD-10-CM

## 2024-01-23 LAB
ALBUMIN SERPL BCP-MCNC: 4.5 G/DL (ref 3.5–5)
ALP SERPL-CCNC: 59 U/L (ref 34–104)
ALT SERPL W P-5'-P-CCNC: 11 U/L (ref 7–52)
ANION GAP SERPL CALCULATED.3IONS-SCNC: 11 MMOL/L
AST SERPL W P-5'-P-CCNC: 17 U/L (ref 13–39)
BASOPHILS # BLD AUTO: 0.05 THOUSANDS/ÂΜL (ref 0–0.1)
BASOPHILS NFR BLD AUTO: 1 % (ref 0–1)
BILIRUB SERPL-MCNC: 0.68 MG/DL (ref 0.2–1)
BUN SERPL-MCNC: 25 MG/DL (ref 5–25)
CALCIUM SERPL-MCNC: 10 MG/DL (ref 8.4–10.2)
CHLORIDE SERPL-SCNC: 105 MMOL/L (ref 96–108)
CHOLEST SERPL-MCNC: 189 MG/DL
CO2 SERPL-SCNC: 24 MMOL/L (ref 21–32)
CREAT SERPL-MCNC: 0.87 MG/DL (ref 0.6–1.3)
EOSINOPHIL # BLD AUTO: 0.27 THOUSAND/ÂΜL (ref 0–0.61)
EOSINOPHIL NFR BLD AUTO: 3 % (ref 0–6)
ERYTHROCYTE [DISTWIDTH] IN BLOOD BY AUTOMATED COUNT: 13.2 % (ref 11.6–15.1)
EST. AVERAGE GLUCOSE BLD GHB EST-MCNC: 114 MG/DL
GFR SERPL CREATININE-BSD FRML MDRD: 67 ML/MIN/1.73SQ M
GLUCOSE P FAST SERPL-MCNC: 98 MG/DL (ref 65–99)
HBA1C MFR BLD: 5.6 %
HCT VFR BLD AUTO: 42.9 % (ref 34.8–46.1)
HDLC SERPL-MCNC: 61 MG/DL
HGB BLD-MCNC: 13.9 G/DL (ref 11.5–15.4)
IMM GRANULOCYTES # BLD AUTO: 0.05 THOUSAND/UL (ref 0–0.2)
IMM GRANULOCYTES NFR BLD AUTO: 1 % (ref 0–2)
LDLC SERPL CALC-MCNC: 107 MG/DL (ref 0–100)
LYMPHOCYTES # BLD AUTO: 2.79 THOUSANDS/ÂΜL (ref 0.6–4.47)
LYMPHOCYTES NFR BLD AUTO: 33 % (ref 14–44)
MCH RBC QN AUTO: 29.4 PG (ref 26.8–34.3)
MCHC RBC AUTO-ENTMCNC: 32.4 G/DL (ref 31.4–37.4)
MCV RBC AUTO: 91 FL (ref 82–98)
MONOCYTES # BLD AUTO: 0.69 THOUSAND/ÂΜL (ref 0.17–1.22)
MONOCYTES NFR BLD AUTO: 8 % (ref 4–12)
NEUTROPHILS # BLD AUTO: 4.66 THOUSANDS/ÂΜL (ref 1.85–7.62)
NEUTS SEG NFR BLD AUTO: 54 % (ref 43–75)
NRBC BLD AUTO-RTO: 0 /100 WBCS
PLATELET # BLD AUTO: 309 THOUSANDS/UL (ref 149–390)
PMV BLD AUTO: 10.8 FL (ref 8.9–12.7)
POTASSIUM SERPL-SCNC: 4 MMOL/L (ref 3.5–5.3)
PROT SERPL-MCNC: 7.1 G/DL (ref 6.4–8.4)
RBC # BLD AUTO: 4.73 MILLION/UL (ref 3.81–5.12)
SODIUM SERPL-SCNC: 140 MMOL/L (ref 135–147)
TRIGL SERPL-MCNC: 103 MG/DL
WBC # BLD AUTO: 8.51 THOUSAND/UL (ref 4.31–10.16)

## 2024-01-23 PROCEDURE — 80053 COMPREHEN METABOLIC PANEL: CPT

## 2024-01-23 PROCEDURE — 80061 LIPID PANEL: CPT

## 2024-01-23 PROCEDURE — 36415 COLL VENOUS BLD VENIPUNCTURE: CPT

## 2024-01-23 PROCEDURE — 83036 HEMOGLOBIN GLYCOSYLATED A1C: CPT

## 2024-01-23 PROCEDURE — 85025 COMPLETE CBC W/AUTO DIFF WBC: CPT

## 2024-01-30 ENCOUNTER — OFFICE VISIT (OUTPATIENT)
Dept: INTERNAL MEDICINE CLINIC | Facility: CLINIC | Age: 69
End: 2024-01-30
Payer: MEDICARE

## 2024-01-30 ENCOUNTER — HOSPITAL ENCOUNTER (OUTPATIENT)
Dept: RADIOLOGY | Facility: HOSPITAL | Age: 69
Discharge: HOME | End: 2024-01-30
Payer: MEDICARE

## 2024-01-30 VITALS
SYSTOLIC BLOOD PRESSURE: 130 MMHG | WEIGHT: 145.4 LBS | DIASTOLIC BLOOD PRESSURE: 76 MMHG | BODY MASS INDEX: 25.76 KG/M2 | HEIGHT: 63 IN

## 2024-01-30 DIAGNOSIS — R73.01 IMPAIRED FASTING BLOOD SUGAR: ICD-10-CM

## 2024-01-30 DIAGNOSIS — G47.00 INSOMNIA, CONTROLLED: ICD-10-CM

## 2024-01-30 DIAGNOSIS — E78.01 FAMILIAL HYPERCHOLESTEROLEMIA: ICD-10-CM

## 2024-01-30 DIAGNOSIS — Z00.00 MEDICARE ANNUAL WELLNESS VISIT, SUBSEQUENT: ICD-10-CM

## 2024-01-30 DIAGNOSIS — E55.9 VITAMIN D DEFICIENCY: ICD-10-CM

## 2024-01-30 DIAGNOSIS — M81.0 AGE-RELATED OSTEOPOROSIS WITHOUT CURRENT PATHOLOGICAL FRACTURE: Primary | ICD-10-CM

## 2024-01-30 DIAGNOSIS — M46.1 SACROILIITIS (HCC): ICD-10-CM

## 2024-01-30 DIAGNOSIS — R05.3 CHRONIC COUGH: ICD-10-CM

## 2024-01-30 DIAGNOSIS — F41.1 GAD (GENERALIZED ANXIETY DISORDER): ICD-10-CM

## 2024-01-30 PROCEDURE — G0439 PPPS, SUBSEQ VISIT: HCPCS | Performed by: INTERNAL MEDICINE

## 2024-01-30 PROCEDURE — 99214 OFFICE O/P EST MOD 30 MIN: CPT | Performed by: INTERNAL MEDICINE

## 2024-01-30 PROCEDURE — 71046 X-RAY EXAM CHEST 2 VIEWS: CPT

## 2024-01-30 RX ORDER — ZOLPIDEM TARTRATE 12.5 MG/1
12.5 TABLET, FILM COATED, EXTENDED RELEASE ORAL
Qty: 30 TABLET | Refills: 0 | Status: SHIPPED | OUTPATIENT
Start: 2024-01-30

## 2024-01-30 RX ORDER — DIAZEPAM 5 MG/1
5 TABLET ORAL DAILY PRN
Qty: 30 TABLET | Refills: 0 | Status: SHIPPED | OUTPATIENT
Start: 2024-01-30

## 2024-01-30 NOTE — PATIENT INSTRUCTIONS
Medicare Preventive Visit Patient Instructions  Thank you for completing your Welcome to Medicare Visit or Medicare Annual Wellness Visit today. Your next wellness visit will be due in one year (1/30/2025).  The screening/preventive services that you may require over the next 5-10 years are detailed below. Some tests may not apply to you based off risk factors and/or age. Screening tests ordered at today's visit but not completed yet may show as past due. Also, please note that scanned in results may not display below.  Preventive Screenings:  Service Recommendations Previous Testing/Comments   Colorectal Cancer Screening  * Colonoscopy    * Fecal Occult Blood Test (FOBT)/Fecal Immunochemical Test (FIT)  * Fecal DNA/Cologuard Test  * Flexible Sigmoidoscopy Age: 45-75 years old   Colonoscopy: every 10 years (may be performed more frequently if at higher risk)  OR  FOBT/FIT: every 1 year  OR  Cologuard: every 3 years  OR  Sigmoidoscopy: every 5 years  Screening may be recommended earlier than age 45 if at higher risk for colorectal cancer. Also, an individualized decision between you and your healthcare provider will decide whether screening between the ages of 76-85 would be appropriate. Colonoscopy: 08/27/2015  FOBT/FIT: Not on file  Cologuard: Not on file  Sigmoidoscopy: Not on file          Breast Cancer Screening Age: 40+ years old  Frequency: every 1-2 years  Not required if history of left and right mastectomy Mammogram: 10/13/2022        Cervical Cancer Screening Between the ages of 21-29, pap smear recommended once every 3 years.   Between the ages of 30-65, can perform pap smear with HPV co-testing every 5 years.   Recommendations may differ for women with a history of total hysterectomy, cervical cancer, or abnormal pap smears in past. Pap Smear: 10/25/2022        Hepatitis C Screening Once for adults born between 1945 and 1965  More frequently in patients at high risk for Hepatitis C Hep C Antibody:  06/06/2019        Diabetes Screening 1-2 times per year if you're at risk for diabetes or have pre-diabetes Fasting glucose: 98 mg/dL (1/23/2024)  A1C: 5.6 % (1/23/2024)      Cholesterol Screening Once every 5 years if you don't have a lipid disorder. May order more often based on risk factors. Lipid panel: 01/23/2024          Other Preventive Screenings Covered by Medicare:  Abdominal Aortic Aneurysm (AAA) Screening: covered once if your at risk. You're considered to be at risk if you have a family history of AAA.  Lung Cancer Screening: covers low dose CT scan once per year if you meet all of the following conditions: (1) Age 55-77; (2) No signs or symptoms of lung cancer; (3) Current smoker or have quit smoking within the last 15 years; (4) You have a tobacco smoking history of at least 20 pack years (packs per day multiplied by number of years you smoked); (5) You get a written order from a healthcare provider.  Glaucoma Screening: covered annually if you're considered high risk: (1) You have diabetes OR (2) Family history of glaucoma OR (3)  aged 50 and older OR (4)  American aged 65 and older  Osteoporosis Screening: covered every 2 years if you meet one of the following conditions: (1) You're estrogen deficient and at risk for osteoporosis based off medical history and other findings; (2) Have a vertebral abnormality; (3) On glucocorticoid therapy for more than 3 months; (4) Have primary hyperparathyroidism; (5) On osteoporosis medications and need to assess response to drug therapy.   Last bone density test (DXA Scan): 11/21/2023.  HIV Screening: covered annually if you're between the age of 15-65. Also covered annually if you are younger than 15 and older than 65 with risk factors for HIV infection. For pregnant patients, it is covered up to 3 times per pregnancy.    Immunizations:  Immunization Recommendations   Influenza Vaccine Annual influenza vaccination during flu season is  recommended for all persons aged >= 6 months who do not have contraindications   Pneumococcal Vaccine   * Pneumococcal conjugate vaccine = PCV13 (Prevnar 13), PCV15 (Vaxneuvance), PCV20 (Prevnar 20)  * Pneumococcal polysaccharide vaccine = PPSV23 (Pneumovax) Adults 19-63 yo with certain risk factors or if 65+ yo  If never received any pneumonia vaccine: recommend Prevnar 20 (PCV20)  Give PCV20 if previously received 1 dose of PCV13 or PPSV23   Hepatitis B Vaccine 3 dose series if at intermediate or high risk (ex: diabetes, end stage renal disease, liver disease)   Respiratory syncytial virus (RSV) Vaccine - COVERED BY MEDICARE PART D  * RSVPreF3 (Arexvy) CDC recommends that adults 60 years of age and older may receive a single dose of RSV vaccine using shared clinical decision-making (SCDM)   Tetanus (Td) Vaccine - COST NOT COVERED BY MEDICARE PART B Following completion of primary series, a booster dose should be given every 10 years to maintain immunity against tetanus. Td may also be given as tetanus wound prophylaxis.   Tdap Vaccine - COST NOT COVERED BY MEDICARE PART B Recommended at least once for all adults. For pregnant patients, recommended with each pregnancy.   Shingles Vaccine (Shingrix) - COST NOT COVERED BY MEDICARE PART B  2 shot series recommended in those 19 years and older who have or will have weakened immune systems or those 50 years and older     Health Maintenance Due:      Topic Date Due    Breast Cancer Screening: Mammogram  10/13/2024    Colorectal Cancer Screening  08/27/2025    Hepatitis C Screening  Completed     Immunizations Due:      Topic Date Due    Pneumococcal Vaccine: 65+ Years (1 - PCV) Never done    Influenza Vaccine (1) Never done    COVID-19 Vaccine (4 - 2023-24 season) 09/01/2023     Advance Directives   What are advance directives?  Advance directives are legal documents that state your wishes and plans for medical care. These plans are made ahead of time in case you lose  your ability to make decisions for yourself. Advance directives can apply to any medical decision, such as the treatments you want, and if you want to donate organs.   What are the types of advance directives?  There are many types of advance directives, and each state has rules about how to use them. You may choose a combination of any of the following:  Living will:  This is a written record of the treatment you want. You can also choose which treatments you do not want, which to limit, and which to stop at a certain time. This includes surgery, medicine, IV fluid, and tube feedings.   Durable power of  for healthcare (DPAHC):  This is a written record that states who you want to make healthcare choices for you when you are unable to make them for yourself. This person, called a proxy, is usually a family member or a friend. You may choose more than 1 proxy.  Do not resuscitate (DNR) order:  A DNR order is used in case your heart stops beating or you stop breathing. It is a request not to have certain forms of treatment, such as CPR. A DNR order may be included in other types of advance directives.  Medical directive:  This covers the care that you want if you are in a coma, near death, or unable to make decisions for yourself. You can list the treatments you want for each condition. Treatment may include pain medicine, surgery, blood transfusions, dialysis, IV or tube feedings, and a ventilator (breathing machine).  Values history:  This document has questions about your views, beliefs, and how you feel and think about life. This information can help others choose the care that you would choose.  Why are advance directives important?  An advance directive helps you control your care. Although spoken wishes may be used, it is better to have your wishes written down. Spoken wishes can be misunderstood, or not followed. Treatments may be given even if you do not want them. An advance directive may make it  easier for your family to make difficult choices about your care.   Weight Management   Why it is important to manage your weight:  Being overweight increases your risk of health conditions such as heart disease, high blood pressure, type 2 diabetes, and certain types of cancer. It can also increase your risk for osteoarthritis, sleep apnea, and other respiratory problems. Aim for a slow, steady weight loss. Even a small amount of weight loss can lower your risk of health problems.  How to lose weight safely:  A safe and healthy way to lose weight is to eat fewer calories and get regular exercise. You can lose up about 1 pound a week by decreasing the number of calories you eat by 500 calories each day.   Healthy meal plan for weight management:  A healthy meal plan includes a variety of foods, contains fewer calories, and helps you stay healthy. A healthy meal plan includes the following:  Eat whole-grain foods more often.  A healthy meal plan should contain fiber. Fiber is the part of grains, fruits, and vegetables that is not broken down by your body. Whole-grain foods are healthy and provide extra fiber in your diet. Some examples of whole-grain foods are whole-wheat breads and pastas, oatmeal, brown rice, and bulgur.  Eat a variety of vegetables every day.  Include dark, leafy greens such as spinach, kale, naa greens, and mustard greens. Eat yellow and orange vegetables such as carrots, sweet potatoes, and winter squash.   Eat a variety of fruits every day.  Choose fresh or canned fruit (canned in its own juice or light syrup) instead of juice. Fruit juice has very little or no fiber.  Eat low-fat dairy foods.  Drink fat-free (skim) milk or 1% milk. Eat fat-free yogurt and low-fat cottage cheese. Try low-fat cheeses such as mozzarella and other reduced-fat cheeses.  Choose meat and other protein foods that are low in fat.  Choose beans or other legumes such as split peas or lentils. Choose fish, skinless  "poultry (chicken or turkey), or lean cuts of red meat (beef or pork). Before you cook meat or poultry, cut off any visible fat.   Use less fat and oil.  Try baking foods instead of frying them. Add less fat, such as margarine, sour cream, regular salad dressing and mayonnaise to foods. Eat fewer high-fat foods. Some examples of high-fat foods include french fries, doughnuts, ice cream, and cakes.  Eat fewer sweets.  Limit foods and drinks that are high in sugar. This includes candy, cookies, regular soda, and sweetened drinks.  Exercise:  Exercise at least 30 minutes per day on most days of the week. Some examples of exercise include walking, biking, dancing, and swimming. You can also fit in more physical activity by taking the stairs instead of the elevator or parking farther away from stores. Ask your healthcare provider about the best exercise plan for you.      © Copyright [employer] 2018 Information is for End User's use only and may not be sold, redistributed or otherwise used for commercial purposes. All illustrations and images included in CareNotes® are the copyrighted property of [employer]. or [employer]    Calcium and vitamin D for bone health (Beyond the Basics)    CALCIUM AND VITAMIN D OVERVIEW  Osteoporosis is a common bone disorder that causes a progressive loss in bone density and mass. As a result, bones become thin, weakened, and easily fractured. It is estimated that more than 1.3 million osteoporosis-associated (or \"osteoporotic\") fractures occur every year in the [place], primarily of bone within the spine (the vertebrae), the hip, and the forearm near the wrist. =    A number of treatments can help to prevent loss of bone and treat low bone mass. However, the first step in preventing or treating osteoporosis is to consume foods and drinks that provide calcium, a mineral essential for bone strength, and vitamin D, which aids in calcium breakdown and absorption. " =    CALCIUM AND VITAMIN D BENEFITS  Good nutrition is important at all ages to keep the bones healthy.    Taking calcium reduces bone loss and decreases the risk of fracturing the vertebrae (the bones that surround the spinal cord).  Consuming calcium during childhood (eg, in milk) can lead to higher bone mass in adulthood. This increase in bone density can reduce the risk of fractures later in life.  Calcium may also have benefits in other body systems by reducing blood pressure and cholesterol levels.  Calcium and vitamin D supplements may help prevent tooth loss in older adults.    RECOMMENDATIONS FOR CALCIUM    General recommendations -- Premenopausal women and men should consume at least 1000 mg of calcium, while postmenopausal women should consume 1200 mg (total diet plus supplement). You should not consume more than 2000 mg of calcium per day (total diet plus supplement) due to the risk of side effects.    Calcium in the diet -- The primary sources of calcium in the diet include milk and other dairy products, such as hard cheese, cottage cheese, or yogurt, as well as green vegetables, such as kale and broccoli (table 1). Some cereals, soy products, and fruit juices are fortified with calcium.    Calcium supplements -- The body is able to absorb calcium contained in supplements as well as from dietary sources. If it is not possible to get enough calcium from dietary sources, consult a health care provider to determine the best type, dose, and timing of calcium supplements.     Calcium carbonate is effective and is the least expensive form of calcium. It is best absorbed with a low-iron meal (such as breakfast). Calcium carbonate may not be absorbed well in people who also take a specific medication for gastroesophageal reflux (called a proton pump inhibitor or H2 blocker), which blocks stomach acid.  Many natural calcium carbonate preparations such as oyster shells or bone meal contain some lead.  Calcium  citrate is well absorbed in the fasting state as well as with a meal.  Calcium doses above 500 mg are not absorbed as well as smaller doses, so large doses of supplements should be taken in divided doses (eg, in the morning and evening).  Calcium supplements do not replace other osteoporosis treatments such as hormone replacement, bisphosphonates (eg, risedronate [sample brand name: Actonel] and alendronate [brand name: Fosamax]), and raloxifene (brand name: Evista).    Calcium and vitamin D supplements alone are usually insufficient to prevent age-related bone loss, although they may be beneficial in some subgroups (older adults, those with very low intake). In most patients with or at risk for osteoporosis, the addition of medication or hormonal therapy is necessary in order to slow the breakdown and removal of bone (ie, resorption).     Underlying gastrointestinal diseases -- Patients who do not adequately absorb nutrients from the gastrointestinal tract (due to malabsorption) may require more than 1000 to 1200 mg of calcium per day. In such cases, a health care provider can help to determine the optimal dose of calcium.    Medications -- All medications should be discussed with a health care provider to ensure that possible interactions with calcium are identified. Certain medications change the amount of calcium that is absorbed and/or excreted. As an example, loop diuretics (eg, furosemide [sample brand name: Lasix]) increase the amount of calcium excreted in the urine.    On the other hand, thiazide diuretics (eg, hydrochlorothiazide) can reduce levels of calcium in the urine, potentially reducing the risk of bone loss and kidney stones.    Side effects of calcium -- Calcium is usually easily tolerated when it is taken in divided doses several times per day. Some people experience side effects related to calcium, including constipation and indigestion. Calcium supplements interfere with the absorption of iron  and thyroid hormone, and therefore, these medications should be taken at different times.    Kidney stones -- There is no evidence that consuming large amounts of calcium (from foods and drinks) increases the risk of kidney stones, or that avoiding dietary calcium decreases the risk. In fact, avoiding dairy products is likely to increase the risk of kidney stones.    However, use of calcium supplements may increase the risk of kidney stones in susceptible individuals by raising the level of calcium in the urine. This is particularly true if the supplement is taken between meals or at bedtime, and this is one of the reasons we prefer for patients to get as much of their calcium requirement through dietary means.     IMPORTANCE OF VITAMIN D  Vitamin D decreases bone loss and lowers the risk of fracture, especially in older men and women. Along with calcium, vitamin D also helps to prevent and treat osteoporosis. To absorb calcium efficiently, an adequate amount of vitamin D must be present.    Vitamin D is normally made in the skin after exposure to sunlight.    Recommendations for vitamin D -- The current recommendation is that men over 70 years and postmenopausal women consume at least 800 international units (20 micrograms) of vitamin D per day. Lower levels of vitamin D are not as effective, while high doses can be toxic, especially if taken for long periods of time. Although the optimal intake has not been clearly established in premenopausal women or in younger men with osteoporosis, 600 international units (15 micrograms) of vitamin D daily is generally suggested.    Vitamin D is available as an individual supplement and is included in most multivitamins and some calcium supplements (table 2). Milk is a relatively good dietary source of vitamin D, with approximately 100 international units (2.5 micrograms) per cup (240 mL), and salmon has 800 to 1000 international units (20 to 25 micrograms) of vitamin D per  serving.    Most healthy people don't need to check with their health care provider before taking standard doses of vitamin D and don't need monitoring of vitamin D levels if they are not being treated for vitamin D deficiency. However, recommendations for vitamin D supplementation may be different in people with certain underlying medical conditions, such as sarcoidosis or lymphoma. In these situations, a provider will determine if supplements are needed; if so, the person's vitamin D and calcium levels should be monitored with regular tests.    ©2021 [employer] All rights reserved.

## 2024-01-30 NOTE — ASSESSMENT & PLAN NOTE
She is requesting alternative to Ambien since she has difficulty maintaining sleep. She takes it sparingly. I will have her try Ambien CR.

## 2024-01-30 NOTE — PROGRESS NOTES
Assessment and Plan:     Problem List Items Addressed This Visit        Musculoskeletal and Integument    Age-related osteoporosis without current pathological fracture - Primary     Discussed treatment which she declines at this time  Information for various treatments provided         Relevant Orders    Vitamin D 25 hydroxy    Sacroiliitis (HCC)     Previous SI injection  She is having right sided symptoms at this time and will f/u with pain mgt            Other    Familial hypercholesterolemia    Relevant Orders    CBC and differential    Comprehensive metabolic panel    Lipid panel    PROSPER (generalized anxiety disorder)     She take Valium sparingly. She is taking buspirone but has nausea when she takes it BID. She will try again         Relevant Medications    diazepam (VALIUM) 5 mg tablet    zolpidem (AMBIEN CR) 12.5 MG CR tablet    Insomnia, controlled     She is requesting alternative to Ambien since she has difficulty maintaining sleep. She takes it sparingly. I will have her try Ambien CR.         Relevant Medications    zolpidem (AMBIEN CR) 12.5 MG CR tablet   Other Visit Diagnoses     Medicare annual wellness visit, subsequent        Chronic cough        Relevant Orders    XR chest pa & lateral    Impaired fasting blood sugar        Relevant Orders    Hemoglobin A1C    Vitamin D deficiency        Relevant Orders    Vitamin D 25 hydroxy      Declines vaccines         Preventive health issues were discussed with patient, and age appropriate screening tests were ordered as noted in patient's After Visit Summary.  Personalized health advice and appropriate referrals for health education or preventive services given if needed, as noted in patient's After Visit Summary.     History of Present Illness:     Patient presents for a Medicare Wellness Visit    Started with a cold 2 nights ago and bilateral ear pain left>right today  No fever chills  Chronic cough x 1 year since a URI; no SOB chest tightness  wheezing; denies fatigue, body aches  No OTC meds taken  Chronic pain in the right lower back to the buttock, similar to what she had in the left where she had an ablation 2022  She plans to see her pain mgt doctor       Patient Care Team:  Lea Reyes, MD as PCP - General (Internal Medicine)  MD Cameron Bedoya MD (Otolaryngology)     Review of Systems:     Review of Systems   Constitutional:  Negative for fatigue and fever.   HENT:  Positive for ear pain, rhinorrhea and sore throat. Negative for postnasal drip and trouble swallowing.    Respiratory:  Positive for cough. Negative for chest tightness, shortness of breath and wheezing.    Cardiovascular:  Negative for chest pain and palpitations.   Gastrointestinal:  Positive for abdominal pain. Negative for constipation and diarrhea.   Endocrine:        Right nipple discharge   Musculoskeletal:  Positive for back pain.   Psychiatric/Behavioral:  Positive for sleep disturbance (trouble maintaining sleep).         Problem List:     Patient Active Problem List   Diagnosis   • Sleep disturbance   • PROSPER (generalized anxiety disorder)   • Atrophic vaginitis   • Chronic left-sided low back pain without sciatica   • Eustachian tube disorder, bilateral   • Insomnia, controlled   • MVP (mitral valve prolapse)   • Nephrolithiasis   • Age-related osteoporosis without current pathological fracture   • Mixed hyperlipidemia   • Thyroid nodule   • Recurrent UTI   • Renal cyst, acquired, left   • Glaucoma suspect of both eyes   • Peripheral nerve entrapment syndrome   • Lumbar spondylosis   • Intervertebral disc disorder with radiculopathy of lumbar region   • Sacroiliitis (HCC)   • Familial hypercholesterolemia   • Discharge from right nipple   • Family history of breast cancer   • Screening mammogram for breast cancer   • Abnormal MRI, breast   • Fibrocystic breast changes, right      Past Medical and Surgical History:     Past Medical History:   Diagnosis Date   •  Allergic    • Anxiety    • Chronic kidney disease    • COVID-19 06/13/2022   • COVID-19 06/13/2022   • Headache(784.0)    • Hyperlipidemia    • Recurrent UTI    • Screening for AAA (abdominal aortic aneurysm) 05/03/2018    fam hx aaa- check screen- last one done 2016- ectatic at 2.7  check labs    • Screening for cardiovascular condition 05/03/2018   • Tonsillitis      Past Surgical History:   Procedure Laterality Date   • CHOLECYSTECTOMY     • CHOLECYSTECTOMY OPEN     • COLONOSCOPY  2014   • CYSTOSCOPY  09/30/2022   • HYSTERECTOMY N/A     uterus removed age 28   • HYSTERECTOMY     • LEG SURGERY Left    • MRI BREAST BIOPSY RIGHT (ALL INCLUSIVE) Right 10/20/2023   • OOPHORECTOMY     • RADIOFREQUENCY ABLATION NERVES  10/06/2022   • SALIVARY GLAND SURGERY Right     excision of parotid tumor/gland   • SALPINGOOPHORECTOMY Bilateral     age 46   • TONSILLECTOMY     • US GUIDED THYROID BIOPSY  03/01/2022      Family History:     Family History   Problem Relation Age of Onset   • Kidney cancer Mother    • Skin cancer Mother    • Kidney nephrosis Mother    • Aneurysm Father         abdominal aortic aneurysm   • Nephrolithiasis Father    • Kidney nephrosis Father    • Breast cancer Sister 53   • Cancer Sister         bone   • Breast cancer Maternal Grandmother 65        60s   • Hypertension Maternal Grandfather    • Diabetes Maternal Grandfather    • Sudden death Maternal Grandfather         cardiac   • Diabetes Paternal Grandfather    • Hypertension Paternal Grandfather    • Polycystic ovary syndrome Daughter    • Breast cancer Maternal Aunt 68        60s   • Stomach cancer Paternal Uncle    • Cancer Paternal Uncle    • Coronary artery disease Neg Hx    • Stroke Neg Hx    • Arthritis Neg Hx    • Hyperlipidemia Neg Hx       Social History:     Social History     Socioeconomic History   • Marital status: /Civil Union     Spouse name: None   • Number of children: 3   • Years of education: None   • Highest education  level: None   Occupational History   • None   Tobacco Use   • Smoking status: Never   • Smokeless tobacco: Never   Vaping Use   • Vaping status: Never Used   Substance and Sexual Activity   • Alcohol use: No   • Drug use: No   • Sexual activity: Yes     Partners: Male     Birth control/protection: Post-menopausal   Other Topics Concern   • None   Social History Narrative   • None     Social Determinants of Health     Financial Resource Strain: Low Risk  (1/23/2024)    Overall Financial Resource Strain (CARDIA)    • Difficulty of Paying Living Expenses: Not hard at all   Food Insecurity: Not on file   Transportation Needs: No Transportation Needs (1/23/2024)    PRAPARE - Transportation    • Lack of Transportation (Medical): No    • Lack of Transportation (Non-Medical): No   Physical Activity: Not on file   Stress: Not on file   Social Connections: Not on file   Intimate Partner Violence: Not At Risk (12/14/2023)    Received from [facility]    • Threatened: Not on file    • Insulted: Not on file    • Physically Hurt : Not on file    • Scream: Not on file   Housing Stability: At Risk (12/14/2023)    Received from [facility]    • Living Situation: Not on file    • Housing Problems: Not on file      Medications and Allergies:     Current Outpatient Medications   Medication Sig Dispense Refill   • busPIRone (BUSPAR) 5 mg tablet Take 1 tablet (5 mg total) by mouth 2 (two) times a day 60 tablet 3   • cholecalciferol (VITAMIN D3) 1,000 units tablet Take 2,000 Units by mouth daily     • diazepam (VALIUM) 5 mg tablet Take 1 tablet (5 mg total) by mouth daily as needed for anxiety No driving with this.  No alcohol with this. Do not combine with zolpidem 30 tablet 0   • estradiol (ESTRACE) 0.1 mg/g vaginal cream Insert 1 g into the vagina 2 (two) times a week 42.5 g 0   • Multiple Vitamins-Minerals (CENTRUM SILVER ADULT 50+) TABS Take 1 tablet by mouth daily       • Probiotic Product (PROBIOTIC  COLON SUPPORT) CAPS Take 1 capsule by mouth daily       • simvastatin (ZOCOR) 40 mg tablet Take 1 tablet (40 mg total) by mouth daily 90 tablet 3   • Vaginal Lubricant (REPLENS) GEL Insert into the vagina     • zolpidem (AMBIEN CR) 12.5 MG CR tablet Take 1 tablet (12.5 mg total) by mouth daily at bedtime as needed for sleep 30 tablet 0     No current facility-administered medications for this visit.     Allergies   Allergen Reactions   • Augmentin [Amoxicillin-Pot Clavulanate] GI Intolerance   • Macrobid [Nitrofurantoin] GI Intolerance      Immunizations:     Immunization History   Administered Date(s) Administered   • COVID-19 PFIZER VACCINE 0.3 ML IM 02/15/2021, 03/07/2021, 11/27/2021      Health Maintenance:         Topic Date Due   • Breast Cancer Screening: Mammogram  10/13/2024   • Colorectal Cancer Screening  08/27/2025   • Hepatitis C Screening  Completed         Topic Date Due   • Pneumococcal Vaccine: 65+ Years (1 - PCV) Never done   • Influenza Vaccine (1) Never done   • COVID-19 Vaccine (4 - 2023-24 season) 09/01/2023      Medicare Screening Tests and Risk Assessments:     Matilda is here for her Subsequent Wellness visit.     Health Risk Assessment:   Patient rates overall health as very good. Patient feels that their physical health rating is same. Patient is very satisfied with their life. Eyesight was rated as same. Hearing was rated as same. Patient feels that their emotional and mental health rating is same. Patients states they are never, rarely angry. Patient states they are never, rarely unusually tired/fatigued. Pain experienced in the last 7 days has been some. Patient's pain rating has been 1/10. Patient states that she has experienced no weight loss or gain in last 6 months.     Depression Screening:   PHQ-2 Score: 0      Fall Risk Screening:   In the past year, patient has experienced: no history of falling in past year      Urinary Incontinence Screening:   Patient has not leaked urine  accidently in the last six months.     Home Safety:  Patient does not have trouble with stairs inside or outside of their home. Patient has working smoke alarms and has working carbon monoxide detector. Home safety hazards include: none.     Nutrition:   Current diet is Regular.     Medications:   Patient is currently taking over-the-counter supplements. OTC medications include: see medication list. Patient is able to manage medications.     Activities of Daily Living (ADLs)/Instrumental Activities of Daily Living (IADLs):   Walk and transfer into and out of bed and chair?: Yes  Dress and groom yourself?: Yes    Bathe or shower yourself?: Yes    Feed yourself? Yes  Do your laundry/housekeeping?: Yes  Manage your money, pay your bills and track your expenses?: Yes  Make your own meals?: Yes    Do your own shopping?: Yes    Previous Hospitalizations:   Any hospitalizations or ED visits within the last 12 months?: No      Advance Care Planning:   Living will: No    Durable POA for healthcare: Yes    Advanced directive: No      Cognitive Screening:   Provider or family/friend/caregiver concerned regarding cognition?: No    PREVENTIVE SCREENINGS      Cardiovascular Screening:    General: Screening Not Indicated and History Lipid Disorder      Diabetes Screening:     General: Screening Current      Colorectal Cancer Screening:     General: Screening Current      Breast Cancer Screening:     General: Screening Current      Cervical Cancer Screening:    General: Screening Not Indicated      Osteoporosis Screening:    General: Screening Not Indicated and History Osteoporosis      Abdominal Aortic Aneurysm (AAA) Screening:        General: Screening Current      Lung Cancer Screening:     General: Screening Not Indicated      Hepatitis C Screening:    General: Screening Current    Screening, Brief Intervention, and Referral to Treatment (SBIRT)    Screening  Typical number of drinks in a day: 0  Typical number of drinks in a  "week: 0  Interpretation: Low risk drinking behavior.    AUDIT-C Screenin) How often did you have a drink containing alcohol in the past year? never  2) How many drinks did you have on a typical day when you were drinking in the past year? 0  3) How often did you have 6 or more drinks on one occasion in the past year? never    AUDIT-C Score: 0  Interpretation: Score 0-2 (female): Negative screen for alcohol misuse    Single Item Drug Screening:  How often have you used an illegal drug (including marijuana) or a prescription medication for non-medical reasons in the past year? never    Single Item Drug Screen Score: 0  Interpretation: Negative screen for possible drug use disorder    No results found.     Physical Exam:     /76 (BP Location: Left arm, Patient Position: Sitting, Cuff Size: Standard)   Ht 5' 3\" (1.6 m)   Wt 66 kg (145 lb 6.4 oz)   BMI 25.76 kg/m²     Physical Exam  Constitutional:       General: She is not in acute distress.     Appearance: She is well-developed. She is not ill-appearing, toxic-appearing or diaphoretic.   HENT:      Right Ear: External ear normal. There is no impacted cerumen.      Left Ear: External ear normal. There is no impacted cerumen.   Eyes:      Conjunctiva/sclera: Conjunctivae normal.   Cardiovascular:      Rate and Rhythm: Normal rate and regular rhythm.      Heart sounds: Normal heart sounds. No murmur heard.  Pulmonary:      Effort: Pulmonary effort is normal. No respiratory distress.      Breath sounds: Normal breath sounds. No stridor. No wheezing or rales.   Abdominal:      General: There is no distension.      Palpations: Abdomen is soft. There is no mass.      Tenderness: There is no abdominal tenderness. There is no guarding or rebound.   Musculoskeletal:      Cervical back: Neck supple.      Right lower leg: No edema.      Left lower leg: No edema.   Neurological:      Mental Status: She is alert and oriented to person, place, and time.   Psychiatric:   "       Mood and Affect: Mood normal.         Behavior: Behavior normal.         Thought Content: Thought content normal.         Judgment: Judgment normal.          Lea Reyes, MD

## 2024-01-30 NOTE — ASSESSMENT & PLAN NOTE
She take Valium sparingly. She is taking buspirone but has nausea when she takes it BID. She will try again

## 2024-02-01 DIAGNOSIS — R05.3 CHRONIC COUGH: Primary | ICD-10-CM

## 2024-02-19 ENCOUNTER — HOSPITAL ENCOUNTER (OUTPATIENT)
Dept: ULTRASOUND IMAGING | Facility: HOSPITAL | Age: 69
Discharge: HOME | End: 2024-02-19
Attending: OTOLARYNGOLOGY
Payer: MEDICARE

## 2024-02-19 DIAGNOSIS — E04.2 MULTIPLE THYROID NODULES: ICD-10-CM

## 2024-02-19 PROCEDURE — 76536 US EXAM OF HEAD AND NECK: CPT

## 2024-02-21 PROBLEM — N39.0 RECURRENT UTI: Status: RESOLVED | Noted: 2018-11-28 | Resolved: 2024-02-21

## 2024-02-27 DIAGNOSIS — E78.01 FAMILIAL HYPERCHOLESTEROLEMIA: ICD-10-CM

## 2024-02-27 RX ORDER — SIMVASTATIN 40 MG
40 TABLET ORAL DAILY
Qty: 90 TABLET | Refills: 1 | Status: SHIPPED | OUTPATIENT
Start: 2024-02-27

## 2024-03-20 NOTE — PROGRESS NOTES
Assessment:  1. Lumbar spondylosis        Plan:  The patient is experiencing right-sided facet mediated pain in the lower back similar to the pain she had prior to the left-sided medial branch radiofrequency ablation that was performed in 2022.  Given that she did extensive physical therapy in 2021 prior to the ablation with no improvement, I discussed proceeding with right-sided L3-5 medial branch blocks to diagnose facet mediated pain in anticipation of eventual radiofrequency ablation.  She would like to proceed and will be scheduled under fluoroscopic guidance.    Complete risks and benefits including bleeding, infection, tissue reaction, nerve injury and allergic reaction were discussed. The approach was demonstrated using models and literature was provided. Verbal and written consent was obtained.    My impressions and treatment recommendations were discussed in detail with the patient who verbalized understanding and had no further questions.  Discharge instructions were provided. I personally saw and examined the patient and I agree with the above discussed plan of care.    Orders Placed This Encounter   Procedures   • FL spine and pain procedure     Standing Status:   Future     Standing Expiration Date:   3/25/2028     Order Specific Question:   Reason for Exam:     Answer:   Right L3-5 MBB     Order Specific Question:   Anticoagulant hold needed?     Answer:   No     No orders of the defined types were placed in this encounter.      History of Present Illness:  Matilda Hernandez is a 68 y.o. female who presents for a follow up office visit in regards to Back Pain (Right side).   The patient has a history of lumbar spondylosis and previously underwent left L3-5 medial branch radiofrequency ablation in October 2022 with excellent relief which has been ongoing.  She reports that over the last 3 months she has had worsening right-sided lower back pain similar to what she experienced prior to the ablation.   She has been doing regular home exercise including walking and stretching which she does daily without relief in symptoms.  She had done physical therapy prior to the ablation for over 4 months with no improvement in symptoms and does not want to do that again.  Current pain symptoms are moderate rated 6/10 on numeric rating scale and felt nearly constantly when she is active.  Symptoms today in the right lower back and do not radiate down the legs hip or groin.    I have personally reviewed and/or updated the patient's past medical history, past surgical history, family history, social history, current medications, allergies, and vital signs today.     Review of Systems   Respiratory:  Negative for shortness of breath.    Cardiovascular:  Negative for chest pain.   Gastrointestinal:  Negative for constipation, diarrhea, nausea and vomiting.   Musculoskeletal:  Positive for back pain, gait problem and joint swelling. Negative for arthralgias and myalgias.   Skin:  Negative for rash.   Neurological:  Negative for dizziness, seizures and weakness.   All other systems reviewed and are negative.      Patient Active Problem List   Diagnosis   • Sleep disturbance   • PROSPER (generalized anxiety disorder)   • Atrophic vaginitis   • Chronic left-sided low back pain without sciatica   • Eustachian tube disorder, bilateral   • Insomnia, controlled   • MVP (mitral valve prolapse)   • Nephrolithiasis   • Age-related osteoporosis without current pathological fracture   • Mixed hyperlipidemia   • Thyroid nodule   • Renal cyst, acquired, left   • Glaucoma suspect of both eyes   • Peripheral nerve entrapment syndrome   • Lumbar spondylosis   • Intervertebral disc disorder with radiculopathy of lumbar region   • Sacroiliitis (HCC)   • Familial hypercholesterolemia   • Discharge from right nipple   • Family history of breast cancer   • Screening mammogram for breast cancer   • Abnormal MRI, breast   • Fibrocystic breast changes, right        Past Medical History:   Diagnosis Date   • Allergic    • Anxiety    • Chronic kidney disease    • COVID-19 06/13/2022   • COVID-19 06/13/2022   • Headache(784.0)    • Hyperlipidemia    • Recurrent UTI    • Screening for AAA (abdominal aortic aneurysm) 05/03/2018    fam hx aaa- check screen- last one done 2016- ectatic at 2.7  check labs    • Screening for cardiovascular condition 05/03/2018   • Tonsillitis        Past Surgical History:   Procedure Laterality Date   • CHOLECYSTECTOMY     • CHOLECYSTECTOMY OPEN     • COLONOSCOPY  2014   • CYSTOSCOPY  09/30/2022   • HYSTERECTOMY N/A     uterus removed age 28   • HYSTERECTOMY     • LEG SURGERY Left    • MRI BREAST BIOPSY RIGHT (ALL INCLUSIVE) Right 10/20/2023   • OOPHORECTOMY     • RADIOFREQUENCY ABLATION NERVES  10/06/2022   • SALIVARY GLAND SURGERY Right     excision of parotid tumor/gland   • SALPINGOOPHORECTOMY Bilateral     age 46   • TONSILLECTOMY     • US GUIDED THYROID BIOPSY  03/01/2022       Family History   Problem Relation Age of Onset   • Kidney cancer Mother    • Skin cancer Mother    • Kidney nephrosis Mother    • Aneurysm Father         abdominal aortic aneurysm   • Nephrolithiasis Father    • Kidney nephrosis Father    • Breast cancer Sister 53   • Cancer Sister         bone   • Breast cancer Maternal Grandmother 65        60s   • Hypertension Maternal Grandfather    • Diabetes Maternal Grandfather    • Sudden death Maternal Grandfather         cardiac   • Diabetes Paternal Grandfather    • Hypertension Paternal Grandfather    • Polycystic ovary syndrome Daughter    • Breast cancer Maternal Aunt 68        60s   • Stomach cancer Paternal Uncle    • Cancer Paternal Uncle    • Coronary artery disease Neg Hx    • Stroke Neg Hx    • Arthritis Neg Hx    • Hyperlipidemia Neg Hx        Social History     Occupational History   • Not on file   Tobacco Use   • Smoking status: Never   • Smokeless tobacco: Never   Vaping Use   • Vaping status: Never Used    Substance and Sexual Activity   • Alcohol use: No   • Drug use: No   • Sexual activity: Yes     Partners: Male     Birth control/protection: Post-menopausal       Current Outpatient Medications on File Prior to Visit   Medication Sig   • busPIRone (BUSPAR) 5 mg tablet Take 1 tablet (5 mg total) by mouth 2 (two) times a day   • cholecalciferol (VITAMIN D3) 1,000 units tablet Take 2,000 Units by mouth daily   • diazepam (VALIUM) 5 mg tablet Take 1 tablet (5 mg total) by mouth daily as needed for anxiety No driving with this.  No alcohol with this. Do not combine with zolpidem   • estradiol (ESTRACE) 0.1 mg/g vaginal cream Insert 1 g into the vagina 2 (two) times a week   • Multiple Vitamins-Minerals (CENTRUM SILVER ADULT 50+) TABS Take 1 tablet by mouth daily     • Probiotic Product (PROBIOTIC COLON SUPPORT) CAPS Take 1 capsule by mouth daily     • simvastatin (ZOCOR) 40 mg tablet Take 1 tablet (40 mg total) by mouth daily   • Vaginal Lubricant (REPLENS) GEL Insert into the vagina   • zolpidem (AMBIEN CR) 12.5 MG CR tablet Take 1 tablet (12.5 mg total) by mouth daily at bedtime as needed for sleep     No current facility-administered medications on file prior to visit.       Allergies   Allergen Reactions   • Augmentin [Amoxicillin-Pot Clavulanate] GI Intolerance   • Macrobid [Nitrofurantoin] GI Intolerance       Physical Exam:    /100   Pulse 80   Wt 65.8 kg (145 lb)   BMI 25.69 kg/m²     Constitutional:normal, well developed, well nourished, alert, in no distress and non-toxic and no overt pain behavior.  Eyes:anicteric  HEENT:grossly intact  Neck:supple, symmetric, trachea midline and no masses   Pulmonary:even and unlabored  Cardiovascular:No edema or pitting edema present  Skin:Normal without rashes or lesions and well hydrated  Psychiatric:Mood and affect appropriate  Neurologic:Cranial Nerves II-XII grossly intact  Musculoskeletal:normal    Lumbar Spine Exam  Appearance:  Normal  lordosis  Palpation/Tenderness:  right lumbar paraspinal tenderness  Right-sided lumbar facet tenderness L4-5, L5-S1 with positive facet loading  Range of Motion:  Flexion:  Minimally limited  with pain  Extension:  Moderately limited  with pain  Motor Strength:  Left hip flexion:  5/5  Left hip extension:  5/5  Right hip flexion:  5/5  Right hip extension:  5/5  Left knee flexion:  5/5  Left knee extension:  5/5  Right knee flexion:  5/5  Right knee extension:  5/5  Left foot dorsiflexion:  5/5  Left foot plantar flexion:  5/5  Right foot dorsiflexion:  5/5  Right foot plantar flexion:  5/5    Imaging    MRI LUMBAR SPINE WITHOUT CONTRAST (4/21/2022)     INDICATION: M47.816: Spondylosis without myelopathy or radiculopathy, lumbar region  M46.1: Sacroiliitis, not elsewhere classified.     COMPARISON:  MR 9/26/2017     TECHNIQUE:  Sagittal T1, sagittal T2, sagittal inversion recovery, axial T1 and axial T2, coronal T2.    IMAGE QUALITY:  Diagnostic     FINDINGS:     VERTEBRAL BODIES:  There are 5 nonrib-bearing lumbar type vertebral bodies.  Trace degenerative anterolisthesis of L4 on L5.  Stable L2 level hemangioma.     SACRUM:  Normal signal within the sacrum. No evidence of insufficiency or stress fracture.     DISTAL CORD AND CONUS:  Normal size and signal within the distal cord and conus.     PARASPINAL SOFT TISSUES:  Paraspinal soft tissues are unremarkable.     LOWER THORACIC DISC SPACES:  Normal disc height and signal.  No disc herniation, canal stenosis or foraminal narrowing.  Minor, noncompressive bulge T11-T12.        LUMBAR DISC SPACES:     L1-L2:  Normal.     L2-L3:  Normal.     L3-L4:  Normal.     L4-L5:  Moderate facet arthrosis, grade 1 anterolisthesis, minor disc bulge but no significant stenosis.     L5-S1:  Moderate right greater than left facet arthrosis which is progressed minimally since prior study.  Minor bulge.

## 2024-03-25 ENCOUNTER — OFFICE VISIT (OUTPATIENT)
Dept: PAIN MEDICINE | Facility: CLINIC | Age: 69
End: 2024-03-25
Payer: MEDICARE

## 2024-03-25 ENCOUNTER — HOSPITAL ENCOUNTER (OUTPATIENT)
Dept: MAMMOGRAPHY | Facility: HOSPITAL | Age: 69
Discharge: HOME | End: 2024-03-25
Attending: SURGERY
Payer: MEDICARE

## 2024-03-25 VITALS
SYSTOLIC BLOOD PRESSURE: 162 MMHG | HEART RATE: 80 BPM | DIASTOLIC BLOOD PRESSURE: 100 MMHG | WEIGHT: 145 LBS | BODY MASS INDEX: 25.69 KG/M2

## 2024-03-25 VITALS — HEIGHT: 63 IN | WEIGHT: 145 LBS | BODY MASS INDEX: 25.69 KG/M2

## 2024-03-25 DIAGNOSIS — M47.816 LUMBAR SPONDYLOSIS: Primary | ICD-10-CM

## 2024-03-25 DIAGNOSIS — Z12.31 SCREENING MAMMOGRAM FOR BREAST CANCER: ICD-10-CM

## 2024-03-25 PROCEDURE — 77067 SCR MAMMO BI INCL CAD: CPT

## 2024-03-25 PROCEDURE — 99214 OFFICE O/P EST MOD 30 MIN: CPT | Performed by: ANESTHESIOLOGY

## 2024-03-25 PROCEDURE — 77063 BREAST TOMOSYNTHESIS BI: CPT

## 2024-04-15 ENCOUNTER — TELEPHONE (OUTPATIENT)
Dept: HEMATOLOGY ONCOLOGY | Facility: CLINIC | Age: 69
End: 2024-04-15

## 2024-04-15 NOTE — TELEPHONE ENCOUNTER
Appointment Change  Cancel, Reschedule, Change to Virtual      Who are you speaking with? Patient   If it is not the patient, is the caller listed on the communication consent form? N/A   Which provider is the appointment scheduled with? [facility]   When was the original appointment scheduled?    Please list date and time 5/2/24 @ 2   At which location is the appointment scheduled to take place? [place]   Was the appointment rescheduled?     Was the appointment changed from an in person visit to a virtual visit?    If so, please list the details of the change. no   What is the reason for the appointment change? Will call back       Was STAR transport scheduled? No   Does STAR transport need to be scheduled for the new visit (if applicable) No   Does the patient need an infusion appointment rescheduled? No   Does the patient have an upcoming infusion appointment scheduled? If so, when? No   Is the patient undergoing chemotherapy? No   For appointments cancelled with less than 24 hours:  Was the no-show policy reviewed? N/A

## 2024-04-18 DIAGNOSIS — N95.2 ATROPHIC VAGINITIS: ICD-10-CM

## 2024-04-18 RX ORDER — ESTRADIOL 0.1 MG/G
1 CREAM VAGINAL 2 TIMES WEEKLY
Qty: 42.5 G | Refills: 0 | Status: SHIPPED | OUTPATIENT
Start: 2024-04-18

## 2024-04-19 ENCOUNTER — HOSPITAL ENCOUNTER (OUTPATIENT)
Dept: RADIOLOGY | Facility: CLINIC | Age: 69
Discharge: HOME | End: 2024-04-19
Payer: MEDICARE

## 2024-04-19 VITALS
SYSTOLIC BLOOD PRESSURE: 145 MMHG | DIASTOLIC BLOOD PRESSURE: 79 MMHG | HEART RATE: 57 BPM | TEMPERATURE: 98.4 F | RESPIRATION RATE: 20 BRPM | OXYGEN SATURATION: 98 %

## 2024-04-19 DIAGNOSIS — M47.816 LUMBAR SPONDYLOSIS: ICD-10-CM

## 2024-04-19 PROCEDURE — 64493 INJ PARAVERT F JNT L/S 1 LEV: CPT | Performed by: ANESTHESIOLOGY

## 2024-04-19 PROCEDURE — 64494 INJ PARAVERT F JNT L/S 2 LEV: CPT | Performed by: ANESTHESIOLOGY

## 2024-04-19 RX ORDER — BUPIVACAINE HCL/PF 2.5 MG/ML
10 VIAL (ML) INJECTION ONCE
Status: COMPLETED | OUTPATIENT
Start: 2024-04-19 | End: 2024-04-19

## 2024-04-19 RX ADMIN — BUPIVACAINE HYDROCHLORIDE 1.5 ML: 2.5 INJECTION, SOLUTION EPIDURAL; INFILTRATION; INTRACAUDAL at 10:54

## 2024-04-19 NOTE — H&P
History of Present Illness: The patient is a 68 y.o. female who presents with complaints of right lower back pain is here today for right L3-5 blocks    Past Medical History:   Diagnosis Date    Allergic     Anxiety     Chronic kidney disease     COVID-19 06/13/2022    COVID-19 06/13/2022    Headache(784.0)     Hyperlipidemia     Recurrent UTI     Screening for AAA (abdominal aortic aneurysm) 05/03/2018    fam hx aaa- check screen- last one done 2016- ectatic at 2.7  check labs     Screening for cardiovascular condition 05/03/2018    Tonsillitis        Past Surgical History:   Procedure Laterality Date    BREAST BIOPSY Right 10/20/2023    mri guided biopsy    CHOLECYSTECTOMY      CHOLECYSTECTOMY OPEN      COLONOSCOPY  2014    CYSTOSCOPY  09/30/2022    HYSTERECTOMY N/A     uterus removed age 28    HYSTERECTOMY      LEG SURGERY Left     MRI BREAST BIOPSY RIGHT (ALL INCLUSIVE) Right 10/20/2023    OOPHORECTOMY      RADIOFREQUENCY ABLATION NERVES  10/06/2022    SALIVARY GLAND SURGERY Right     excision of parotid tumor/gland    SALPINGOOPHORECTOMY Bilateral     age 46    TONSILLECTOMY      US GUIDED THYROID BIOPSY  03/01/2022         Current Outpatient Medications:     busPIRone (BUSPAR) 5 mg tablet, Take 1 tablet (5 mg total) by mouth 2 (two) times a day, Disp: 60 tablet, Rfl: 3    cholecalciferol (VITAMIN D3) 1,000 units tablet, Take 2,000 Units by mouth daily, Disp: , Rfl:     diazepam (VALIUM) 5 mg tablet, Take 1 tablet (5 mg total) by mouth daily as needed for anxiety No driving with this.  No alcohol with this. Do not combine with zolpidem, Disp: 30 tablet, Rfl: 0    estradiol (ESTRACE) 0.1 mg/g vaginal cream, Insert 1 g into the vagina 2 (two) times a week, Disp: 42.5 g, Rfl: 0    Multiple Vitamins-Minerals (CENTRUM SILVER ADULT 50+) TABS, Take 1 tablet by mouth daily  , Disp: , Rfl:     Probiotic Product (PROBIOTIC COLON SUPPORT) CAPS, Take 1 capsule by mouth daily  , Disp: , Rfl:     simvastatin (ZOCOR) 40 mg  tablet, Take 1 tablet (40 mg total) by mouth daily, Disp: 90 tablet, Rfl: 1    Vaginal Lubricant (REPLENS) GEL, Insert into the vagina, Disp: , Rfl:     zolpidem (AMBIEN CR) 12.5 MG CR tablet, Take 1 tablet (12.5 mg total) by mouth daily at bedtime as needed for sleep, Disp: 30 tablet, Rfl: 0    Current Facility-Administered Medications:     bupivacaine (PF) (MARCAINE) 0.25 % injection 10 mL, 10 mL, Perineural, Once, Ziyad Varma MD    Allergies   Allergen Reactions    Augmentin [Amoxicillin-Pot Clavulanate] GI Intolerance    Macrobid [Nitrofurantoin] GI Intolerance       Physical Exam:   Vitals:    04/19/24 1029   BP: 154/90   Pulse: 65   Resp: 20   Temp: 98.4 °F (36.9 °C)   SpO2: 99%     General: Awake, Alert, Oriented x 3, Mood and affect appropriate  Respiratory: Respirations even and unlabored  Cardiovascular: Peripheral pulses intact; no edema  Musculoskeletal Exam: Right-sided low back pain    ASA Score: 3    Patient/Chart Verification  Patient ID Verified: Verbal  Consents Confirmed: To be obtained in the Pre-Procedure area  Interval H&P(within 24 hr) Complete (required for Outpatients and Surgery Admit only): To be obtained in the Pre-Procedure area  Allergies Reviewed: Yes  Anticoag/NSAID held?: NA  Currently on antibiotics?: No    Assessment:   1. Lumbar spondylosis        Plan: Right L3-5 MBB

## 2024-04-19 NOTE — DISCHARGE INSTR - LAB

## 2024-04-24 ENCOUNTER — TELEPHONE (OUTPATIENT)
Dept: PAIN MEDICINE | Facility: CLINIC | Age: 69
End: 2024-04-24

## 2024-04-24 ENCOUNTER — TELEPHONE (OUTPATIENT)
Age: 69
End: 2024-04-24

## 2024-04-24 NOTE — TELEPHONE ENCOUNTER
----- Message from Ziyad Varma MD sent at 4/23/2024  3:47 PM EDT -----  Pain diary reviewed.  Okay to proceed with confirmatory MBB  ----- Message -----  From: Monica Alegria  Sent: 4/22/2024   9:11 AM EDT  To: MD Dr. Avani Warner,     Patient faxed in her pain diary, it is scanned into her chart for your review.    Noman

## 2024-04-24 NOTE — TELEPHONE ENCOUNTER
Caller: Patient    Doctor: Avani    Reason for call: Pt calling to see if pain diary was reveled. Advised I do see it in the chart, I will send a message for dr to review and pt will receive call back with next steps.    Call back#: [phone]

## 2024-04-29 ENCOUNTER — TELEPHONE (OUTPATIENT)
Dept: SURGICAL ONCOLOGY | Facility: CLINIC | Age: 69
End: 2024-04-29

## 2024-04-29 NOTE — TELEPHONE ENCOUNTER
Called and spoke to patient. We rescheduled her 5/2 appointment with Armida since she will not be in the office. She is now scheduled for 5/1/24 at 11:30 am. Patient was agreeable.

## 2024-05-01 ENCOUNTER — OFFICE VISIT (OUTPATIENT)
Dept: SURGICAL ONCOLOGY | Facility: CLINIC | Age: 69
End: 2024-05-01
Payer: MEDICARE

## 2024-05-01 VITALS
WEIGHT: 145 LBS | HEART RATE: 87 BPM | TEMPERATURE: 98.2 F | HEIGHT: 63 IN | OXYGEN SATURATION: 98 % | BODY MASS INDEX: 25.69 KG/M2 | SYSTOLIC BLOOD PRESSURE: 154 MMHG | DIASTOLIC BLOOD PRESSURE: 78 MMHG

## 2024-05-01 DIAGNOSIS — Z13.79 ASHKENAZI JEWISH ANCESTRY REQUIRING POPULATION-SPECIFIC GENETIC SCREENING: ICD-10-CM

## 2024-05-01 DIAGNOSIS — Z80.3 FAMILY HISTORY OF BREAST CANCER: ICD-10-CM

## 2024-05-01 DIAGNOSIS — Z12.31 VISIT FOR SCREENING MAMMOGRAM: Primary | ICD-10-CM

## 2024-05-01 DIAGNOSIS — Z91.89 INCREASED RISK OF BREAST CANCER: ICD-10-CM

## 2024-05-01 DIAGNOSIS — Z12.39 BREAST CANCER SCREENING OTHER THAN MAMMOGRAM: ICD-10-CM

## 2024-05-01 PROCEDURE — 99213 OFFICE O/P EST LOW 20 MIN: CPT

## 2024-05-01 NOTE — PROGRESS NOTES
Surgical Oncology Follow Up       [facility] [place]  [address] [facility] [place] [address]    Matilda Hernandez  1955  [id number]  [address] [facility] [place]  [address] [facility]  [facility] [id number]    Diagnoses and all orders for this visit:    Visit for screening mammogram  -     Mammo screening bilateral w 3d & cad; Future    Family history of breast cancer  -     MRI breast bilateral w and wo contrast w cad; Future    Increased risk of breast cancer  -     MRI breast bilateral w and wo contrast w cad; Future    Ashkenazi Denominational ancestry requiring population-specific genetic screening  -     MRI breast bilateral w and wo contrast w cad; Future    Breast cancer screening other than mammogram  -     MRI breast bilateral w and wo contrast w cad; Future  -     BUN; Future  -     Creatinine, serum; Future        Chief Complaint   Patient presents with    Follow-up       Return in about 1 year (around 5/1/2025) for Office Visit, Imaging - See orders.      History of Present Illness: The patient returns to the office today in follow-up for benign breast follow-up.  She denies any new concerns since her visit with Dr Nation 6 months ago.  She continues to have very scant, occasional discharge from the right nipple.  She denies any new lumps, skin changes or tenderness.  She had been scheduled for a consultation with Genetic Counseling for her family history of breast cancer and Ashkenazi ancestry.  However, she ultimately cancelled this appointment and is unsure if she wants to reschedule.  Mammogram was performed on March 25, BIRADS-2, density category 2.  I have reviewed these results myself and discussed them with the patient today.      Review of Systems  Complete ROS Surg Onc:   Complete ROS Surg Onc:   Constitutional: The patient denies new or recent history of general fatigue, no recent  weight loss, no change in appetite.   Eyes: No complaints of visual problems, no scleral icterus.   ENT: no complaints of ear pain, no hoarseness, no difficulty swallowing,  no tinnitus and no new masses in head, oral cavity, or neck.   Cardiovascular: No complaints of chest pain, no palpitations, no ankle edema.   Respiratory: No complaints of shortness of breath, no cough.   Gastrointestinal: No complaints of jaundice, no bloody stools, no pale stools.   Genitourinary: No complaints of dysuria, no hematuria, no nocturia, no frequent urination, no urethral discharge.   Musculoskeletal: No complaints of weakness, paralysis, joint stiffness or arthralgias.  Integumentary: No complaints of rash, no new lesions.   Neurological: No complaints of convulsions, no seizures, no dizziness.   Hematologic/Lymphatic: No complaints of easy bruising.   Endocrine:  No hot or cold intolerance.  No polydipsia, polyphagia, or polyuria.  Allergy/immunology:  No environmental allergies.  No food allergies.  Not immunocompromised.  Skin:  No pallor or rash.  No wound.        Patient Active Problem List   Diagnosis    Sleep disturbance    PROSPER (generalized anxiety disorder)    Atrophic vaginitis    Chronic left-sided low back pain without sciatica    Eustachian tube disorder, bilateral    Insomnia, controlled    MVP (mitral valve prolapse)    Nephrolithiasis    Age-related osteoporosis without current pathological fracture    Mixed hyperlipidemia    Thyroid nodule    Renal cyst, acquired, left    Glaucoma suspect of both eyes    Peripheral nerve entrapment syndrome    Lumbar spondylosis    Intervertebral disc disorder with radiculopathy of lumbar region    Sacroiliitis (HCC)    Familial hypercholesterolemia    Discharge from right nipple    Family history of breast cancer    Screening mammogram for breast cancer    Abnormal MRI, breast    Fibrocystic breast changes, right     Past Medical History:   Diagnosis Date    Allergic     Anxiety      Chronic kidney disease     COVID-19 06/13/2022    COVID-19 06/13/2022    Headache(784.0)     Hyperlipidemia     Recurrent UTI     Screening for AAA (abdominal aortic aneurysm) 05/03/2018    fam hx aaa- check screen- last one done 2016- ectatic at 2.7  check labs     Screening for cardiovascular condition 05/03/2018    Tonsillitis      Past Surgical History:   Procedure Laterality Date    BREAST BIOPSY Right 10/20/2023    mri guided biopsy    CHOLECYSTECTOMY      CHOLECYSTECTOMY OPEN      COLONOSCOPY  2014    CYSTOSCOPY  09/30/2022    HYSTERECTOMY N/A     uterus removed age 28    HYSTERECTOMY      LEG SURGERY Left     MRI BREAST BIOPSY RIGHT (ALL INCLUSIVE) Right 10/20/2023    OOPHORECTOMY      RADIOFREQUENCY ABLATION NERVES  10/06/2022    SALIVARY GLAND SURGERY Right     excision of parotid tumor/gland    SALPINGOOPHORECTOMY Bilateral     age 46    TONSILLECTOMY      US GUIDED THYROID BIOPSY  03/01/2022     Family History   Problem Relation Age of Onset    Kidney cancer Mother     Skin cancer Mother     Kidney nephrosis Mother     Aneurysm Father         abdominal aortic aneurysm    Nephrolithiasis Father     Kidney nephrosis Father     Breast cancer Sister 53    Cancer Sister         bone    Breast cancer Maternal Grandmother 65        60s    Hypertension Maternal Grandfather     Diabetes Maternal Grandfather     Sudden death Maternal Grandfather         cardiac    Diabetes Paternal Grandfather     Hypertension Paternal Grandfather     Polycystic ovary syndrome Daughter     Breast cancer Maternal Aunt 68        60s    Stomach cancer Paternal Uncle     Cancer Paternal Uncle     Coronary artery disease Neg Hx     Stroke Neg Hx     Arthritis Neg Hx     Hyperlipidemia Neg Hx      Social History     Socioeconomic History    Marital status: /Civil Union     Spouse name: Not on file    Number of children: 3    Years of education: Not on file    Highest education level: Not on file   Occupational History    Not  on file   Tobacco Use    Smoking status: Never    Smokeless tobacco: Never   Vaping Use    Vaping status: Never Used   Substance and Sexual Activity    Alcohol use: No    Drug use: No    Sexual activity: Yes     Partners: Male     Birth control/protection: Post-menopausal   Other Topics Concern    Not on file   Social History Narrative    Not on file     Social Determinants of Health     Financial Resource Strain: Low Risk  (1/23/2024)    Overall Financial Resource Strain (CARDIA)     Difficulty of Paying Living Expenses: Not hard at all   Food Insecurity: Not on file   Transportation Needs: No Transportation Needs (1/23/2024)    PRAPARE - Transportation     Lack of Transportation (Medical): No     Lack of Transportation (Non-Medical): No   Physical Activity: Not on file   Stress: Not on file   Social Connections: Not on file   Intimate Partner Violence: Not At Risk (12/14/2023)    Received from [facility]     Threatened: Not on file     Insulted: Not on file     Physically Hurt : Not on file     Scream: Not on file   Housing Stability: At Risk (12/14/2023)    Received from [facility] Housing     Living Situation: Not on file     Housing Problems: Not on file       Current Outpatient Medications:     busPIRone (BUSPAR) 5 mg tablet, Take 1 tablet (5 mg total) by mouth 2 (two) times a day, Disp: 60 tablet, Rfl: 3    cholecalciferol (VITAMIN D3) 1,000 units tablet, Take 2,000 Units by mouth daily, Disp: , Rfl:     diazepam (VALIUM) 5 mg tablet, Take 1 tablet (5 mg total) by mouth daily as needed for anxiety No driving with this.  No alcohol with this. Do not combine with zolpidem, Disp: 30 tablet, Rfl: 0    estradiol (ESTRACE) 0.1 mg/g vaginal cream, Insert 1 g into the vagina 2 (two) times a week, Disp: 42.5 g, Rfl: 0    Multiple Vitamins-Minerals (CENTRUM SILVER ADULT 50+) TABS, Take 1 tablet by mouth daily  , Disp: , Rfl:     Probiotic Product (PROBIOTIC COLON SUPPORT) CAPS, Take 1 capsule  by mouth daily  , Disp: , Rfl:     simvastatin (ZOCOR) 40 mg tablet, Take 1 tablet (40 mg total) by mouth daily, Disp: 90 tablet, Rfl: 1    Vaginal Lubricant (REPLENS) GEL, Insert into the vagina, Disp: , Rfl:     zolpidem (AMBIEN CR) 12.5 MG CR tablet, Take 1 tablet (12.5 mg total) by mouth daily at bedtime as needed for sleep, Disp: 30 tablet, Rfl: 0  Allergies   Allergen Reactions    Augmentin [Amoxicillin-Pot Clavulanate] GI Intolerance    Macrobid [Nitrofurantoin] GI Intolerance     Vitals:    05/01/24 1124   BP: 154/78   Pulse: 87   Temp: 98.2 °F (36.8 °C)   SpO2: 98%       Physical Exam  Vitals reviewed.   Constitutional:       General: She is not in acute distress.     Appearance: Normal appearance. She is normal weight. She is not ill-appearing or toxic-appearing.   HENT:      Head: Normocephalic and atraumatic.      Nose: Nose normal.      Mouth/Throat:      Mouth: Mucous membranes are moist.   Eyes:      General: No scleral icterus.     Conjunctiva/sclera: Conjunctivae normal.   Cardiovascular:      Rate and Rhythm: Normal rate.   Pulmonary:      Effort: Pulmonary effort is normal.   Chest:   Breasts:     Right: Normal.      Left: Normal.      Comments: Breasts are smooth and symmetric bilaterally. There are no dominant masses, nodules, skin changes or tenderness on exam. I do not appreciate any adenopathy.    Musculoskeletal:         General: Normal range of motion.      Cervical back: Normal range of motion and neck supple.   Lymphadenopathy:      Cervical: No cervical adenopathy.      Upper Body:      Right upper body: No supraclavicular, axillary or pectoral adenopathy.      Left upper body: No supraclavicular, axillary or pectoral adenopathy.   Skin:     General: Skin is warm and dry.   Neurological:      General: No focal deficit present.      Mental Status: She is alert and oriented to person, place, and time.   Psychiatric:         Mood and Affect: Mood normal.         Behavior: Behavior normal.          Thought Content: Thought content normal.         Judgment: Judgment normal.           Imaging  Mammo screening bilateral w 3d & cad  03/25/2024  Narrative & Impression   DIAGNOSIS: Screening mammogram for breast cancer      TECHNIQUE:  Digital screening mammography was performed. Computer Aided Detection (CAD) analyzed all applicable images.   COMPARISONS: Multiple prior exams dated between 11/29/2006 and 10/13/2022.     RELEVANT HISTORY:   Family Breast Cancer History: History of breast cancer in Sister, Maternal Grandmother, Maternal Aunt.  Family Medical History: Family medical history includes breast cancer in 3 relatives (maternal aunt, maternal grandmother, sister).   Personal History: Hormone history includes birth control and estrogen replacement therapy. Surgical history includes breast biopsy, hysterectomy, and oophorectomy. No known relevant medical history.     The patient is scheduled in a reminder system for screening mammography.     8-10% of cancers will be missed on mammography. Management of a palpable abnormality must be based on clinical grounds.  Patients will be notified of their results via letter from our facility. Accredited by American College of [employer] and FDA.     RISK ASSESSMENT:   5 Year Tyrer-Cuzick: 2.56%  10 Year Tyrer-Cuzick: 5.39%  Lifetime Tyrer-Cuzick: 8.48%     TISSUE DENSITY:   There are scattered areas of fibroglandular density.      INDICATION: Matilda Hernandez is a 68 y.o. female presenting for screening mammography.     FINDINGS:   Bilateral  There are no suspicious masses, grouped microcalcifications or areas of unexplained architectural distortion. The skin and nipple areolar complex are unremarkable.  Biopsy marker clips are noted in the right breast.  A few diffusely distributed calcifications are noted in each breast.  Circumscribed mass in the left breast demonstrates long-term stability.     IMPRESSION:  No mammographic evidence of malignancy.      ASSESSMENT/BI-RADS CATEGORY:  Left: 2 - Benign  Right: 2 - Benign  Overall: 2 - Benign     RECOMMENDATION:       - Routine screening mammogram in 1 year for both breasts.       I personally reviewed and interpreted the above imaging data.    Discussion/Summary: This is a 69 y/o female with a strong family history of breast cancer and Ashkenazi ancestry who presents today for breast surveillance.  Recent imaging and physical exam are unremarkable, and she has no new concerns.  We discussed the need for supplemental screening.  Since her breasts are not particularly dense, I do not recommend ABUS.  We could consider annual MRI given her risk factors; she is in agreement with this. I will plan to see her once a year for a clinical exam, and will coordinate annual mammography and MRI at 6 month intervals. She has been instructed to call with any new concerns.  All questions were answered today.

## 2024-05-16 ENCOUNTER — HOSPITAL ENCOUNTER (OUTPATIENT)
Dept: RADIOLOGY | Facility: CLINIC | Age: 69
End: 2024-05-16
Payer: MEDICARE

## 2024-05-16 VITALS
SYSTOLIC BLOOD PRESSURE: 156 MMHG | RESPIRATION RATE: 18 BRPM | DIASTOLIC BLOOD PRESSURE: 92 MMHG | HEART RATE: 58 BPM | TEMPERATURE: 97.6 F | OXYGEN SATURATION: 97 %

## 2024-05-16 DIAGNOSIS — M47.816 LUMBAR SPONDYLOSIS: ICD-10-CM

## 2024-05-16 DIAGNOSIS — F41.1 GAD (GENERALIZED ANXIETY DISORDER): ICD-10-CM

## 2024-05-16 PROCEDURE — 64493 INJ PARAVERT F JNT L/S 1 LEV: CPT | Performed by: ANESTHESIOLOGY

## 2024-05-16 PROCEDURE — 64494 INJ PARAVERT F JNT L/S 2 LEV: CPT | Performed by: ANESTHESIOLOGY

## 2024-05-16 RX ORDER — BUPIVACAINE HYDROCHLORIDE 7.5 MG/ML
5 INJECTION, SOLUTION EPIDURAL; RETROBULBAR ONCE
Status: COMPLETED | OUTPATIENT
Start: 2024-05-16 | End: 2024-05-16

## 2024-05-16 RX ORDER — METHOCARBAMOL 500 MG/1
500 TABLET, FILM COATED ORAL 2 TIMES DAILY PRN
Qty: 30 TABLET | Refills: 0 | Status: SHIPPED | OUTPATIENT
Start: 2024-05-16

## 2024-05-16 RX ORDER — BUSPIRONE HYDROCHLORIDE 5 MG/1
5 TABLET ORAL 2 TIMES DAILY
Qty: 60 TABLET | Refills: 5 | Status: SHIPPED | OUTPATIENT
Start: 2024-05-16

## 2024-05-16 RX ADMIN — BUPIVACAINE HYDROCHLORIDE 1.5 ML: 7.5 INJECTION, SOLUTION EPIDURAL; RETROBULBAR at 10:09

## 2024-05-16 NOTE — H&P
History of Present Illness: The patient is a 68 y.o. female who presents with complaints of right low back pain is here today for right L3-5 confirmatory blocks    Past Medical History:   Diagnosis Date    Allergic     Anxiety     Chronic kidney disease     COVID-19 06/13/2022    COVID-19 06/13/2022    Headache(784.0)     Hyperlipidemia     Recurrent UTI     Screening for AAA (abdominal aortic aneurysm) 05/03/2018    fam hx aaa- check screen- last one done 2016- ectatic at 2.7  check labs     Screening for cardiovascular condition 05/03/2018    Tonsillitis        Past Surgical History:   Procedure Laterality Date    BREAST BIOPSY Right 10/20/2023    mri guided biopsy    CHOLECYSTECTOMY      CHOLECYSTECTOMY OPEN      COLONOSCOPY  2014    CYSTOSCOPY  09/30/2022    HYSTERECTOMY N/A     uterus removed age 28    HYSTERECTOMY      LEG SURGERY Left     MRI BREAST BIOPSY RIGHT (ALL INCLUSIVE) Right 10/20/2023    OOPHORECTOMY      RADIOFREQUENCY ABLATION NERVES  10/06/2022    SALIVARY GLAND SURGERY Right     excision of parotid tumor/gland    SALPINGOOPHORECTOMY Bilateral     age 46    TONSILLECTOMY      US GUIDED THYROID BIOPSY  03/01/2022         Current Outpatient Medications:     busPIRone (BUSPAR) 5 mg tablet, Take 1 tablet (5 mg total) by mouth 2 (two) times a day, Disp: 60 tablet, Rfl: 5    cholecalciferol (VITAMIN D3) 1,000 units tablet, Take 2,000 Units by mouth daily, Disp: , Rfl:     diazepam (VALIUM) 5 mg tablet, Take 1 tablet (5 mg total) by mouth daily as needed for anxiety No driving with this.  No alcohol with this. Do not combine with zolpidem, Disp: 30 tablet, Rfl: 0    estradiol (ESTRACE) 0.1 mg/g vaginal cream, Insert 1 g into the vagina 2 (two) times a week, Disp: 42.5 g, Rfl: 0    Multiple Vitamins-Minerals (CENTRUM SILVER ADULT 50+) TABS, Take 1 tablet by mouth daily  , Disp: , Rfl:     Probiotic Product (PROBIOTIC COLON SUPPORT) CAPS, Take 1 capsule by mouth daily  , Disp: , Rfl:     simvastatin  (ZOCOR) 40 mg tablet, Take 1 tablet (40 mg total) by mouth daily, Disp: 90 tablet, Rfl: 1    Vaginal Lubricant (REPLENS) GEL, Insert into the vagina, Disp: , Rfl:     zolpidem (AMBIEN CR) 12.5 MG CR tablet, Take 1 tablet (12.5 mg total) by mouth daily at bedtime as needed for sleep, Disp: 30 tablet, Rfl: 0    Current Facility-Administered Medications:     bupivacaine (PF) (MARCAINE) 0.75 % injection 5 mL, 5 mL, Other, Once, Ziyad Varma MD    Allergies   Allergen Reactions    Augmentin [Amoxicillin-Pot Clavulanate] GI Intolerance    Macrobid [Nitrofurantoin] GI Intolerance       Physical Exam:   Vitals:    05/16/24 0955   BP: 151/86   Pulse: 58   Resp: 18   Temp: 97.6 °F (36.4 °C)   SpO2: 97%     General: Awake, Alert, Oriented x 3, Mood and affect appropriate  Respiratory: Respirations even and unlabored  Cardiovascular: Peripheral pulses intact; no edema  Musculoskeletal Exam: Right lower back pain    ASA Score: 3    Patient/Chart Verification  Patient ID Verified: Verbal  ID Band Applied: No  Consents Confirmed: Procedural, To be obtained in the Pre-Procedure area  H&P( within 30 days) Verified: To be obtained in the Pre-Procedure area  Allergies Reviewed: Yes  Anticoag/NSAID held?: No  Currently on antibiotics?: No  Does Patient Have a Prosthetic Device/Implant: No (denies cardiac pacemaker / ICD)    Assessment:   1. Lumbar spondylosis        Plan: Right L3-5 MBB#2

## 2024-05-16 NOTE — DISCHARGE INSTR - LAB

## 2024-05-17 ENCOUNTER — TRANSCRIBE ORDERS (OUTPATIENT)
Dept: PAIN MEDICINE | Facility: CLINIC | Age: 69
End: 2024-05-17

## 2024-05-20 ENCOUNTER — TELEPHONE (OUTPATIENT)
Dept: PAIN MEDICINE | Facility: CLINIC | Age: 69
End: 2024-05-20

## 2024-05-20 NOTE — TELEPHONE ENCOUNTER
----- Message from Ziyad Varma MD sent at 5/17/2024  3:05 PM EDT -----  Okay to proceed with RFA  ----- Message -----  From: Monica Alegria  Sent: 5/17/2024   8:41 AM EDT  To: MD Dr. Avani Warner Cindy faxed in her pain diary, it is scanned into her chart for your review.    Noman

## 2024-06-03 ENCOUNTER — TRANSCRIBE ORDERS (OUTPATIENT)
Dept: ADMINISTRATIVE | Facility: HOSPITAL | Age: 69
End: 2024-06-03

## 2024-06-03 ENCOUNTER — HOSPITAL ENCOUNTER (OUTPATIENT)
Dept: RADIOLOGY | Facility: HOSPITAL | Age: 69
Discharge: HOME | End: 2024-06-03
Payer: MEDICARE

## 2024-06-03 DIAGNOSIS — N20.0 KIDNEY STONE: Primary | ICD-10-CM

## 2024-06-03 DIAGNOSIS — N20.0 KIDNEY STONE: ICD-10-CM

## 2024-06-03 DIAGNOSIS — N39.0 URINARY TRACT INFECTION WITHOUT HEMATURIA, SITE UNSPECIFIED: ICD-10-CM

## 2024-06-03 PROCEDURE — 74018 RADEX ABDOMEN 1 VIEW: CPT

## 2024-06-14 ENCOUNTER — APPOINTMENT (OUTPATIENT)
Dept: LAB | Facility: CLINIC | Age: 69
End: 2024-06-14
Payer: MEDICARE

## 2024-06-14 DIAGNOSIS — M81.0 AGE-RELATED OSTEOPOROSIS WITHOUT CURRENT PATHOLOGICAL FRACTURE: ICD-10-CM

## 2024-06-14 DIAGNOSIS — N39.0 URINARY TRACT INFECTION WITHOUT HEMATURIA, SITE UNSPECIFIED: ICD-10-CM

## 2024-06-14 DIAGNOSIS — E55.9 VITAMIN D DEFICIENCY: ICD-10-CM

## 2024-06-14 DIAGNOSIS — E78.01 FAMILIAL HYPERCHOLESTEROLEMIA: ICD-10-CM

## 2024-06-14 DIAGNOSIS — R73.01 IMPAIRED FASTING BLOOD SUGAR: ICD-10-CM

## 2024-06-14 PROCEDURE — 87086 URINE CULTURE/COLONY COUNT: CPT

## 2024-06-15 LAB — BACTERIA UR CULT: NORMAL

## 2024-07-02 ENCOUNTER — TELEPHONE (OUTPATIENT)
Dept: PAIN MEDICINE | Facility: CLINIC | Age: 69
End: 2024-07-02

## 2024-07-02 ENCOUNTER — HOSPITAL ENCOUNTER (OUTPATIENT)
Dept: RADIOLOGY | Facility: CLINIC | Age: 69
Discharge: HOME | End: 2024-07-02
Payer: MEDICARE

## 2024-07-02 VITALS
RESPIRATION RATE: 18 BRPM | DIASTOLIC BLOOD PRESSURE: 89 MMHG | OXYGEN SATURATION: 97 % | SYSTOLIC BLOOD PRESSURE: 158 MMHG | HEART RATE: 73 BPM | TEMPERATURE: 98.8 F

## 2024-07-02 DIAGNOSIS — M47.816 LUMBAR SPONDYLOSIS: ICD-10-CM

## 2024-07-02 PROCEDURE — 64636 DESTROY L/S FACET JNT ADDL: CPT | Performed by: ANESTHESIOLOGY

## 2024-07-02 PROCEDURE — 64635 DESTROY LUMB/SAC FACET JNT: CPT | Performed by: ANESTHESIOLOGY

## 2024-07-02 RX ORDER — LIDOCAINE HYDROCHLORIDE 10 MG/ML
30 INJECTION, SOLUTION EPIDURAL; INFILTRATION; INTRACAUDAL; PERINEURAL ONCE
Status: COMPLETED | OUTPATIENT
Start: 2024-07-02 | End: 2024-07-02

## 2024-07-02 RX ORDER — BUPIVACAINE HCL/PF 2.5 MG/ML
10 VIAL (ML) INJECTION ONCE
Status: COMPLETED | OUTPATIENT
Start: 2024-07-02 | End: 2024-07-02

## 2024-07-02 RX ORDER — METHYLPREDNISOLONE ACETATE 80 MG/ML
80 INJECTION, SUSPENSION INTRA-ARTICULAR; INTRALESIONAL; INTRAMUSCULAR; PARENTERAL; SOFT TISSUE ONCE
Status: COMPLETED | OUTPATIENT
Start: 2024-07-02 | End: 2024-07-02

## 2024-07-02 RX ADMIN — METHYLPREDNISOLONE ACETATE 80 MG: 80 INJECTION, SUSPENSION INTRA-ARTICULAR; INTRALESIONAL; INTRAMUSCULAR; PARENTERAL; SOFT TISSUE at 09:19

## 2024-07-02 RX ADMIN — LIDOCAINE HYDROCHLORIDE 16 ML: 10 INJECTION, SOLUTION EPIDURAL; INFILTRATION; INTRACAUDAL; PERINEURAL at 09:13

## 2024-07-02 RX ADMIN — BUPIVACAINE HYDROCHLORIDE 10 ML: 2.5 INJECTION, SOLUTION EPIDURAL; INFILTRATION; INTRACAUDAL at 09:19

## 2024-07-02 NOTE — H&P
History of Present Illness: The patient is a 68 y.o. female who presents with complaints of right lower back pain is here today for right L3-5 ablation    Past Medical History:   Diagnosis Date    Allergic     Anxiety     Chronic kidney disease     COVID-19 06/13/2022    COVID-19 06/13/2022    Headache(784.0)     Hyperlipidemia     Recurrent UTI     Screening for AAA (abdominal aortic aneurysm) 05/03/2018    fam hx aaa- check screen- last one done 2016- ectatic at 2.7  check labs     Screening for cardiovascular condition 05/03/2018    Tonsillitis        Past Surgical History:   Procedure Laterality Date    BREAST BIOPSY Right 10/20/2023    mri guided biopsy    CHOLECYSTECTOMY      CHOLECYSTECTOMY OPEN      COLONOSCOPY  2014    CYSTOSCOPY  09/30/2022    HYSTERECTOMY N/A     uterus removed age 28    HYSTERECTOMY      LEG SURGERY Left     MRI BREAST BIOPSY RIGHT (ALL INCLUSIVE) Right 10/20/2023    OOPHORECTOMY      RADIOFREQUENCY ABLATION NERVES  10/06/2022    SALIVARY GLAND SURGERY Right     excision of parotid tumor/gland    SALPINGOOPHORECTOMY Bilateral     age 46    TONSILLECTOMY      US GUIDED THYROID BIOPSY  03/01/2022         Current Outpatient Medications:     busPIRone (BUSPAR) 5 mg tablet, Take 1 tablet (5 mg total) by mouth 2 (two) times a day, Disp: 60 tablet, Rfl: 5    cholecalciferol (VITAMIN D3) 1,000 units tablet, Take 2,000 Units by mouth daily, Disp: , Rfl:     diazepam (VALIUM) 5 mg tablet, Take 1 tablet (5 mg total) by mouth daily as needed for anxiety No driving with this.  No alcohol with this. Do not combine with zolpidem, Disp: 30 tablet, Rfl: 0    estradiol (ESTRACE) 0.1 mg/g vaginal cream, Insert 1 g into the vagina 2 (two) times a week, Disp: 42.5 g, Rfl: 0    methocarbamol (ROBAXIN) 500 mg tablet, Take 1 tablet (500 mg total) by mouth 2 (two) times a day as needed for muscle spasms, Disp: 30 tablet, Rfl: 0    Multiple Vitamins-Minerals (CENTRUM SILVER ADULT 50+) TABS, Take 1 tablet by mouth  daily  , Disp: , Rfl:     Probiotic Product (PROBIOTIC COLON SUPPORT) CAPS, Take 1 capsule by mouth daily  , Disp: , Rfl:     simvastatin (ZOCOR) 40 mg tablet, Take 1 tablet (40 mg total) by mouth daily, Disp: 90 tablet, Rfl: 1    Vaginal Lubricant (REPLENS) GEL, Insert into the vagina, Disp: , Rfl:     zolpidem (AMBIEN CR) 12.5 MG CR tablet, Take 1 tablet (12.5 mg total) by mouth daily at bedtime as needed for sleep, Disp: 30 tablet, Rfl: 0    Current Facility-Administered Medications:     bupivacaine (PF) (MARCAINE) 0.25 % injection 10 mL, 10 mL, Perineural, Once, Ziyad Varma MD    lidocaine (PF) (XYLOCAINE-MPF) 1 % injection 30 mL, 30 mL, Infiltration, Once, Ziyad Varma MD    methylPREDNISolone acetate (DEPO-MEDROL) injection 80 mg, 80 mg, Perineural, Once, Ziyad Varma MD    Allergies   Allergen Reactions    Augmentin [Amoxicillin-Pot Clavulanate] GI Intolerance    Macrobid [Nitrofurantoin] GI Intolerance       Physical Exam:   Vitals:    07/02/24 0857   BP: 156/84   Pulse: 69   Resp: 20   Temp: 98.8 °F (37.1 °C)   SpO2: 96%     General: Awake, Alert, Oriented x 3, Mood and affect appropriate  Respiratory: Respirations even and unlabored  Cardiovascular: Peripheral pulses intact; no edema  Musculoskeletal Exam: Right lower back pain    ASA Score: 2    Patient/Chart Verification  Patient ID Verified: Verbal  ID Band Applied: No  Consents Confirmed: Procedural, To be obtained in the Pre-Procedure area  Interval H&P(within 24 hr) Complete (required for Outpatients and Surgery Admit only): To be obtained in the Pre-Procedure area  Allergies Reviewed: Yes  Anticoag/NSAID held?: NA  Currently on antibiotics?: No  Does Patient Have a Prosthetic Device/Implant: No    Assessment:   1. Lumbar spondylosis        Plan: Right L3-5 RFA

## 2024-07-02 NOTE — DISCHARGE INSTR - LAB

## 2024-07-03 NOTE — TELEPHONE ENCOUNTER
Pt reports she had sores near the Rt hip area last night and put ice pack on it. Today she has more soreness, rates 6-7/10. Told pt the soreness will dissipate over the next few days. Pt said she has the pain still from before the procedure. RN explained to pt it will take 6 wks until she sees the full benefit from the ablation. Things will get slowly better week by week.  Inj sites looked fine this AM when checked by .   Advised pt to use ice packs or heat as needed along with her Advil 3 times a day as needed.   Pt given 6 wk post RFA ovs w/ Connie for 8/7/24.    Pt mentioned that she and FQ talked yest about possibly getting the left side RFA done the end of Aug b/c she is going away the in Sept.  Pt said FQ said he would send a message to Noman to call and schedule her.

## 2024-07-15 DIAGNOSIS — R05.3 CHRONIC COUGH: Primary | ICD-10-CM

## 2024-07-30 ENCOUNTER — APPOINTMENT (OUTPATIENT)
Dept: LAB | Facility: CLINIC | Age: 69
End: 2024-07-30
Payer: MEDICARE

## 2024-07-30 LAB
25(OH)D3 SERPL-MCNC: 39.6 NG/ML (ref 30–100)
ALBUMIN SERPL BCG-MCNC: 4.3 G/DL (ref 3.5–5)
ALP SERPL-CCNC: 65 U/L (ref 34–104)
ALT SERPL W P-5'-P-CCNC: 15 U/L (ref 7–52)
ANION GAP SERPL CALCULATED.3IONS-SCNC: 11 MMOL/L (ref 4–13)
AST SERPL W P-5'-P-CCNC: 17 U/L (ref 13–39)
BASOPHILS # BLD AUTO: 0.04 THOUSANDS/ÂΜL (ref 0–0.1)
BASOPHILS NFR BLD AUTO: 1 % (ref 0–1)
BILIRUB SERPL-MCNC: 0.61 MG/DL (ref 0.2–1)
BUN SERPL-MCNC: 27 MG/DL (ref 5–25)
CALCIUM SERPL-MCNC: 9.8 MG/DL (ref 8.4–10.2)
CHLORIDE SERPL-SCNC: 103 MMOL/L (ref 96–108)
CHOLEST SERPL-MCNC: 193 MG/DL
CO2 SERPL-SCNC: 25 MMOL/L (ref 21–32)
CREAT SERPL-MCNC: 0.88 MG/DL (ref 0.6–1.3)
EOSINOPHIL # BLD AUTO: 0.29 THOUSAND/ÂΜL (ref 0–0.61)
EOSINOPHIL NFR BLD AUTO: 3 % (ref 0–6)
ERYTHROCYTE [DISTWIDTH] IN BLOOD BY AUTOMATED COUNT: 13.2 % (ref 11.6–15.1)
EST. AVERAGE GLUCOSE BLD GHB EST-MCNC: 114 MG/DL
GFR SERPL CREATININE-BSD FRML MDRD: 66 ML/MIN/1.73SQ M
GLUCOSE P FAST SERPL-MCNC: 99 MG/DL (ref 65–99)
HBA1C MFR BLD: 5.6 %
HCT VFR BLD AUTO: 42.6 % (ref 34.8–46.1)
HDLC SERPL-MCNC: 65 MG/DL
HGB BLD-MCNC: 13.8 G/DL (ref 11.5–15.4)
IMM GRANULOCYTES # BLD AUTO: 0.03 THOUSAND/UL (ref 0–0.2)
IMM GRANULOCYTES NFR BLD AUTO: 0 % (ref 0–2)
LDLC SERPL CALC-MCNC: 82 MG/DL (ref 0–100)
LYMPHOCYTES # BLD AUTO: 3.12 THOUSANDS/ÂΜL (ref 0.6–4.47)
LYMPHOCYTES NFR BLD AUTO: 36 % (ref 14–44)
MCH RBC QN AUTO: 29.2 PG (ref 26.8–34.3)
MCHC RBC AUTO-ENTMCNC: 32.4 G/DL (ref 31.4–37.4)
MCV RBC AUTO: 90 FL (ref 82–98)
MONOCYTES # BLD AUTO: 0.74 THOUSAND/ÂΜL (ref 0.17–1.22)
MONOCYTES NFR BLD AUTO: 9 % (ref 4–12)
NEUTROPHILS # BLD AUTO: 4.43 THOUSANDS/ÂΜL (ref 1.85–7.62)
NEUTS SEG NFR BLD AUTO: 51 % (ref 43–75)
NONHDLC SERPL-MCNC: 128 MG/DL
NRBC BLD AUTO-RTO: 0 /100 WBCS
PLATELET # BLD AUTO: 279 THOUSANDS/UL (ref 149–390)
PMV BLD AUTO: 10.7 FL (ref 8.9–12.7)
POTASSIUM SERPL-SCNC: 4.3 MMOL/L (ref 3.5–5.3)
PROT SERPL-MCNC: 7.2 G/DL (ref 6.4–8.4)
RBC # BLD AUTO: 4.73 MILLION/UL (ref 3.81–5.12)
SODIUM SERPL-SCNC: 139 MMOL/L (ref 135–147)
TRIGL SERPL-MCNC: 230 MG/DL
WBC # BLD AUTO: 8.65 THOUSAND/UL (ref 4.31–10.16)

## 2024-07-30 PROCEDURE — 80061 LIPID PANEL: CPT

## 2024-07-30 PROCEDURE — 85025 COMPLETE CBC W/AUTO DIFF WBC: CPT

## 2024-07-30 PROCEDURE — 80053 COMPREHEN METABOLIC PANEL: CPT

## 2024-07-30 PROCEDURE — 83036 HEMOGLOBIN GLYCOSYLATED A1C: CPT

## 2024-07-30 PROCEDURE — 82306 VITAMIN D 25 HYDROXY: CPT

## 2024-08-01 ENCOUNTER — RA CDI HCC (OUTPATIENT)
Dept: OTHER | Facility: HOSPITAL | Age: 69
End: 2024-08-01

## 2024-08-01 PROBLEM — Z12.31 SCREENING MAMMOGRAM FOR BREAST CANCER: Status: RESOLVED | Noted: 2023-11-06 | Resolved: 2024-08-01

## 2024-08-07 ENCOUNTER — OFFICE VISIT (OUTPATIENT)
Dept: PAIN MEDICINE | Facility: CLINIC | Age: 69
End: 2024-08-07
Payer: MEDICARE

## 2024-08-07 VITALS
HEART RATE: 78 BPM | SYSTOLIC BLOOD PRESSURE: 166 MMHG | RESPIRATION RATE: 18 BRPM | DIASTOLIC BLOOD PRESSURE: 102 MMHG | BODY MASS INDEX: 25.87 KG/M2 | HEIGHT: 63 IN | WEIGHT: 146 LBS

## 2024-08-07 DIAGNOSIS — G89.4 CHRONIC PAIN SYNDROME: ICD-10-CM

## 2024-08-07 DIAGNOSIS — M46.1 SACROILIITIS (HCC): Primary | ICD-10-CM

## 2024-08-07 DIAGNOSIS — M47.816 LUMBAR SPONDYLOSIS: ICD-10-CM

## 2024-08-07 PROCEDURE — 99214 OFFICE O/P EST MOD 30 MIN: CPT

## 2024-08-07 NOTE — PROGRESS NOTES
Assessment:  1. Sacroiliitis (HCC)    2. Chronic pain syndrome    3. Lumbar spondylosis        Plan:  The patient is a 68-year-old female with a history of chronic pain secondary to low back pain and lumbar spondylosis who presents to the office with ongoing bilateral low back pain, right worse than left and pain that radiates into the right buttock and right hip that is unchanged since her last office visit and unrelieved after undergoing a right-sided L3-5 RFA on 7-24.    On exam, the patient is tender with palpation over right SI joint as well as having positive provocative maneuvers for sacroiliitis.  I instructed patient we can perform a right SI joint injection to decrease inflammation and provide them relief.  Patient would like to proceed.  I instructed our  will schedule them.  Complete risks and benefits including bleeding, infection, tissue reaction, nerve injury and allergic reaction were discussed.  The approach was demonstrated using models and literature was provided.  Verbal and written consent was obtained.    My impressions and treatment recommendations were discussed in detail with the patient who verbalized understanding and had no further questions.  Discharge instructions were provided. I personally saw and examined the patient and I agree with the above discussed plan of care.    Orders Placed This Encounter   Procedures    FL spine and pain procedure     Dr Varma     Standing Status:   Future     Standing Expiration Date:   8/7/2028     Order Specific Question:   Reason for Exam:     Answer:   Right SI joint injection     Order Specific Question:   Anticoagulant hold needed?     Answer:   No     No orders of the defined types were placed in this encounter.      History of Present Illness:  Matilda Hernandez is a 68 y.o. female with a history of chronic pain secondary to low back pain and lumbar spondylosis.  She was last seen on 7/2/2024 where she underwent a right-sided L3-5  radiofrequency ablation which did not provide any relief of her pain.  She presents to the office with on going bilateral low back pain, right worse than left and pain that radiates into the right buttock and right hip.  She is also currently scheduled to undergo a repeat left sided L3-5 RFA on 8/22/2024.  She has had this procedure done in the past which has been very helpful.    She states her pain is the same since the last office visit and constant.  She states her pain is worse with prolonged sitting and activity.  She is quality of her pain is pressure-like and is currently rating her pain a 6/7/2010 on the numeric scale.    She is not currently taking anything for pain, she has tried over-the-counter pain medication and heat without much relief.    I have personally reviewed and/or updated the patient's past medical history, past surgical history, family history, social history, current medications, allergies, and vital signs today.     Review of Systems   Respiratory:  Negative for shortness of breath.    Cardiovascular:  Negative for chest pain.   Gastrointestinal:  Negative for constipation, diarrhea, nausea and vomiting.   Musculoskeletal:  Positive for back pain and gait problem. Negative for arthralgias, joint swelling and myalgias.   Skin:  Negative for rash.   Neurological:  Negative for dizziness, seizures and weakness.   All other systems reviewed and are negative.      Patient Active Problem List   Diagnosis    Sleep disturbance    PROSPER (generalized anxiety disorder)    Atrophic vaginitis    Chronic left-sided low back pain without sciatica    Eustachian tube disorder, bilateral    Insomnia, controlled    MVP (mitral valve prolapse)    Nephrolithiasis    Age-related osteoporosis without current pathological fracture    Mixed hyperlipidemia    Thyroid nodule    Renal cyst, acquired, left    Glaucoma suspect of both eyes    Peripheral nerve entrapment syndrome    Lumbar spondylosis    Intervertebral  disc disorder with radiculopathy of lumbar region    Sacroiliitis (HCC)    Familial hypercholesterolemia    Discharge from right nipple    Family history of breast cancer    Abnormal MRI, breast    Fibrocystic breast changes, right       Past Medical History:   Diagnosis Date    Allergic     Anxiety     Chronic kidney disease     COVID-19 06/13/2022    COVID-19 06/13/2022    Headache(784.0)     Hyperlipidemia     Recurrent UTI     Screening for AAA (abdominal aortic aneurysm) 05/03/2018    fam hx aaa- check screen- last one done 2016- ectatic at 2.7  check labs     Screening for cardiovascular condition 05/03/2018    Screening mammogram for breast cancer 11/06/2023    Tonsillitis        Past Surgical History:   Procedure Laterality Date    BREAST BIOPSY Right 10/20/2023    mri guided biopsy    CHOLECYSTECTOMY      CHOLECYSTECTOMY OPEN      COLONOSCOPY  2014    CYSTOSCOPY  09/30/2022    HYSTERECTOMY N/A     uterus removed age 28    HYSTERECTOMY      LEG SURGERY Left     MRI BREAST BIOPSY RIGHT (ALL INCLUSIVE) Right 10/20/2023    OOPHORECTOMY      RADIOFREQUENCY ABLATION NERVES  10/06/2022    SALIVARY GLAND SURGERY Right     excision of parotid tumor/gland    SALPINGOOPHORECTOMY Bilateral     age 46    TONSILLECTOMY      US GUIDED THYROID BIOPSY  03/01/2022       Family History   Problem Relation Age of Onset    Kidney cancer Mother     Skin cancer Mother     Kidney nephrosis Mother     Aneurysm Father         abdominal aortic aneurysm    Nephrolithiasis Father     Kidney nephrosis Father     Breast cancer Sister 53    Cancer Sister         bone    Breast cancer Maternal Grandmother 65        60s    Hypertension Maternal Grandfather     Diabetes Maternal Grandfather     Sudden death Maternal Grandfather         cardiac    Diabetes Paternal Grandfather     Hypertension Paternal Grandfather     Polycystic ovary syndrome Daughter     Breast cancer Maternal Aunt 68        60s    Stomach cancer Paternal Uncle     Cancer  "Paternal Uncle     Coronary artery disease Neg Hx     Stroke Neg Hx     Arthritis Neg Hx     Hyperlipidemia Neg Hx        Social History     Occupational History    Not on file   Tobacco Use    Smoking status: Never    Smokeless tobacco: Never   Vaping Use    Vaping status: Never Used   Substance and Sexual Activity    Alcohol use: No    Drug use: No    Sexual activity: Yes     Partners: Male       Current Outpatient Medications on File Prior to Visit   Medication Sig    busPIRone (BUSPAR) 5 mg tablet Take 1 tablet (5 mg total) by mouth 2 (two) times a day    cholecalciferol (VITAMIN D3) 1,000 units tablet Take 2,000 Units by mouth daily    diazepam (VALIUM) 5 mg tablet Take 1 tablet (5 mg total) by mouth daily as needed for anxiety No driving with this.  No alcohol with this. Do not combine with zolpidem    estradiol (ESTRACE) 0.1 mg/g vaginal cream Insert 1 g into the vagina 2 (two) times a week    methocarbamol (ROBAXIN) 500 mg tablet Take 1 tablet (500 mg total) by mouth 2 (two) times a day as needed for muscle spasms    Multiple Vitamins-Minerals (CENTRUM SILVER ADULT 50+) TABS Take 1 tablet by mouth daily      Probiotic Product (PROBIOTIC COLON SUPPORT) CAPS Take 1 capsule by mouth daily      simvastatin (ZOCOR) 40 mg tablet Take 1 tablet (40 mg total) by mouth daily    Vaginal Lubricant (REPLENS) GEL Insert into the vagina    zolpidem (AMBIEN CR) 12.5 MG CR tablet Take 1 tablet (12.5 mg total) by mouth daily at bedtime as needed for sleep     No current facility-administered medications on file prior to visit.       Allergies   Allergen Reactions    Augmentin [Amoxicillin-Pot Clavulanate] GI Intolerance    Macrobid [Nitrofurantoin] GI Intolerance       Physical Exam:    BP (!) 166/102   Pulse 78   Resp 18   Ht 5' 3\" (1.6 m)   Wt 66.2 kg (146 lb)   BMI 25.86 kg/m²     Constitutional:normal, well developed, well nourished, alert, in no distress and non-toxic and no overt pain " behavior.  Eyes:anicteric  HEENT:grossly intact  Neck:supple, symmetric, trachea midline and no masses   Pulmonary:even and unlabored  Cardiovascular:No edema or pitting edema present  Skin:Normal without rashes or lesions and well hydrated  Psychiatric:Mood and affect appropriate  Neurologic:Cranial Nerves II-XII grossly intact  Musculoskeletal:antalgic    Lumbar Spine Exam    Appearance:  Normal lordosis  Palpation/Tenderness:  right sacroiliac joint tenderness  Sensory:  no sensory deficits noted  Range of Motion:  Flexion:  Minimally limited  with pain  Extension:  Minimally limited  with pain  Motor Strength:  Left hip flexion:  5/5  Right hip flexion:  5/5  Left knee flexion:  5/5  Left knee extension:  5/5  Right knee flexion:  5/5  Right knee extension:  5/5  Left foot dorsiflexion:  5/5  Left foot plantar flexion:  5/5  Right foot dorsiflexion:  5/5  Right foot plantar flexion:  5/5  Special Tests:  Right Straight Leg Test:  positive  Right Jose's Maneuver:  positive  Right Gaenslen's Test:  positive  Right Pelvic Distraction Test:  positive      Imaging

## 2024-08-08 ENCOUNTER — OFFICE VISIT (OUTPATIENT)
Dept: INTERNAL MEDICINE CLINIC | Facility: CLINIC | Age: 69
End: 2024-08-08
Payer: MEDICARE

## 2024-08-08 VITALS
DIASTOLIC BLOOD PRESSURE: 82 MMHG | OXYGEN SATURATION: 98 % | HEIGHT: 63 IN | WEIGHT: 146 LBS | SYSTOLIC BLOOD PRESSURE: 136 MMHG | HEART RATE: 73 BPM | BODY MASS INDEX: 25.87 KG/M2

## 2024-08-08 DIAGNOSIS — E78.2 MIXED HYPERLIPIDEMIA: ICD-10-CM

## 2024-08-08 DIAGNOSIS — R73.01 IMPAIRED FASTING BLOOD SUGAR: ICD-10-CM

## 2024-08-08 DIAGNOSIS — M81.0 AGE-RELATED OSTEOPOROSIS WITHOUT CURRENT PATHOLOGICAL FRACTURE: ICD-10-CM

## 2024-08-08 DIAGNOSIS — F41.1 GAD (GENERALIZED ANXIETY DISORDER): ICD-10-CM

## 2024-08-08 DIAGNOSIS — F51.04 PSYCHOPHYSIOLOGICAL INSOMNIA: Primary | ICD-10-CM

## 2024-08-08 DIAGNOSIS — M46.1 SACROILIITIS (HCC): ICD-10-CM

## 2024-08-08 PROCEDURE — G2211 COMPLEX E/M VISIT ADD ON: HCPCS | Performed by: INTERNAL MEDICINE

## 2024-08-08 PROCEDURE — 99214 OFFICE O/P EST MOD 30 MIN: CPT | Performed by: INTERNAL MEDICINE

## 2024-08-08 RX ORDER — DIAZEPAM 5 MG/1
5 TABLET ORAL DAILY PRN
Qty: 30 TABLET | Refills: 0 | Status: SHIPPED | OUTPATIENT
Start: 2024-08-08

## 2024-08-08 NOTE — PROGRESS NOTES
Ambulatory Visit  Name: Matilda Hernandez      : 1955      MRN: [id number]  Encounter Provider: Lea Reyes, MD  Encounter Date: 2024   [facility]    Assessment & Plan   1. Psychophysiological insomnia  2. PORSPER (generalized anxiety disorder)  Assessment & Plan:  Considering other antidepressants but not at this time  Continue buspirone and PRN Valium  Orders:  -     diazepam (VALIUM) 5 mg tablet; Take 1 tablet (5 mg total) by mouth daily as needed for anxiety No driving with this.  No alcohol with this. Do not combine with zolpidem  3. Age-related osteoporosis without current pathological fracture  Assessment & Plan:  Declines treatment  Orders:  -     CBC and differential; Future; Expected date: 2025  -     Comprehensive metabolic panel; Future; Expected date: 2025  4. Mixed hyperlipidemia  -     CBC and differential; Future; Expected date: 2025  -     Comprehensive metabolic panel; Future; Expected date: 2025  -     Lipid panel; Future; Expected date: 2025  5. Impaired fasting blood sugar  -     Comprehensive metabolic panel; Future; Expected date: 2025  -     Hemoglobin A1C; Future; Expected date: 2025  6. Sacroiliitis (HCC)  Assessment & Plan:  R SI inj scheduled         History of Present Illness     Here for a follow up  Anxiety, insomnia and taking buspirone 5mg 1-2 at night, Valium PRN, Ambien CR PRN  Going on a trip in September with her , daughter which makes her anxious  Chronic cough with normal CXR, seeing pulmonology tomorrow  Right low back pain down to the buttock and getting SI injection      Review of Systems   Constitutional:  Negative for unexpected weight change.   HENT:  Positive for postnasal drip. Negative for congestion and rhinorrhea.    Respiratory:  Positive for cough. Negative for chest tightness and shortness of breath.    Cardiovascular:  Negative for chest pain.    Gastrointestinal:  Negative for abdominal pain, constipation and diarrhea.   Genitourinary:  Negative for difficulty urinating.   Musculoskeletal:  Positive for back pain.   Psychiatric/Behavioral:  Positive for sleep disturbance. The patient is nervous/anxious.      Past Medical History:   Diagnosis Date   • Allergic    • Anxiety    • Chronic kidney disease    • COVID-19 06/13/2022   • COVID-19 06/13/2022   • Headache(784.0)    • Hyperlipidemia    • Recurrent UTI    • Screening for AAA (abdominal aortic aneurysm) 05/03/2018    fam hx aaa- check screen- last one done 2016- ectatic at 2.7  check labs    • Screening for cardiovascular condition 05/03/2018   • Screening mammogram for breast cancer 11/06/2023   • Tonsillitis      Past Surgical History:   Procedure Laterality Date   • BREAST BIOPSY Right 10/20/2023    mri guided biopsy   • CHOLECYSTECTOMY     • CHOLECYSTECTOMY OPEN     • COLONOSCOPY  2014   • CYSTOSCOPY  09/30/2022   • HYSTERECTOMY N/A     uterus removed age 28   • HYSTERECTOMY     • LEG SURGERY Left    • MRI BREAST BIOPSY RIGHT (ALL INCLUSIVE) Right 10/20/2023   • OOPHORECTOMY     • RADIOFREQUENCY ABLATION NERVES  10/06/2022   • SALIVARY GLAND SURGERY Right     excision of parotid tumor/gland   • SALPINGOOPHORECTOMY Bilateral     age 46   • TONSILLECTOMY     • US GUIDED THYROID BIOPSY  03/01/2022     Family History   Problem Relation Age of Onset   • Kidney cancer Mother    • Skin cancer Mother    • Kidney nephrosis Mother    • Aneurysm Father         abdominal aortic aneurysm   • Nephrolithiasis Father    • Kidney nephrosis Father    • Breast cancer Sister 53   • Cancer Sister         bone   • Breast cancer Maternal Grandmother 65        Also aunt katrin   • Cancer Maternal Grandmother    • Hypertension Maternal Grandfather    • Diabetes Maternal Grandfather    • Sudden death Maternal Grandfather         cardiac   • Diabetes Paternal Grandfather    • Hypertension Paternal Grandfather    • Polycystic  ovary syndrome Daughter    • Breast cancer Maternal Aunt 68        60s   • Cancer Maternal Aunt    • Stomach cancer Paternal Uncle    • Cancer Paternal Uncle    • Coronary artery disease Neg Hx    • Stroke Neg Hx    • Arthritis Neg Hx    • Hyperlipidemia Neg Hx      Social History     Tobacco Use   • Smoking status: Never   • Smokeless tobacco: Never   Vaping Use   • Vaping status: Never Used   Substance and Sexual Activity   • Alcohol use: No   • Drug use: No   • Sexual activity: Yes     Partners: Male     Birth control/protection: Post-menopausal     Current Outpatient Medications on File Prior to Visit   Medication Sig   • busPIRone (BUSPAR) 5 mg tablet Take 1 tablet (5 mg total) by mouth 2 (two) times a day   • cholecalciferol (VITAMIN D3) 1,000 units tablet Take 2,000 Units by mouth daily   • estradiol (ESTRACE) 0.1 mg/g vaginal cream Insert 1 g into the vagina 2 (two) times a week   • methocarbamol (ROBAXIN) 500 mg tablet Take 1 tablet (500 mg total) by mouth 2 (two) times a day as needed for muscle spasms   • Multiple Vitamins-Minerals (CENTRUM SILVER ADULT 50+) TABS Take 1 tablet by mouth daily     • Probiotic Product (PROBIOTIC COLON SUPPORT) CAPS Take 1 capsule by mouth daily     • simvastatin (ZOCOR) 40 mg tablet Take 1 tablet (40 mg total) by mouth daily   • Vaginal Lubricant (REPLENS) GEL Insert into the vagina   • zolpidem (AMBIEN CR) 12.5 MG CR tablet Take 1 tablet (12.5 mg total) by mouth daily at bedtime as needed for sleep   • [DISCONTINUED] diazepam (VALIUM) 5 mg tablet Take 1 tablet (5 mg total) by mouth daily as needed for anxiety No driving with this.  No alcohol with this. Do not combine with zolpidem     Allergies   Allergen Reactions   • Augmentin [Amoxicillin-Pot Clavulanate] GI Intolerance   • Macrobid [Nitrofurantoin] GI Intolerance     Immunization History   Administered Date(s) Administered   • COVID-19 PFIZER VACCINE 0.3 ML IM 02/15/2021, 03/07/2021, 11/27/2021     Objective     BP  "136/82 (BP Location: Left arm, Patient Position: Sitting, Cuff Size: Standard)   Pulse 73   Ht 5' 3\" (1.6 m)   Wt 66.2 kg (146 lb)   SpO2 98%   BMI 25.86 kg/m²     Physical Exam  Constitutional:       General: She is not in acute distress.     Appearance: She is well-developed. She is not ill-appearing, toxic-appearing or diaphoretic.   Eyes:      Conjunctiva/sclera: Conjunctivae normal.   Cardiovascular:      Rate and Rhythm: Normal rate and regular rhythm.      Heart sounds: Normal heart sounds. No murmur heard.  Pulmonary:      Effort: Pulmonary effort is normal. No respiratory distress.      Breath sounds: Normal breath sounds. No stridor. No wheezing or rales.   Abdominal:      General: There is no distension.      Palpations: Abdomen is soft. There is no mass.      Tenderness: There is no abdominal tenderness. There is no guarding or rebound.   Musculoskeletal:      Cervical back: Neck supple.      Right lower leg: No edema.      Left lower leg: No edema.   Neurological:      Mental Status: She is alert and oriented to person, place, and time.   Psychiatric:         Mood and Affect: Mood normal.         Behavior: Behavior normal.         Thought Content: Thought content normal.         Judgment: Judgment normal.         "

## 2024-08-09 ENCOUNTER — CONSULT (OUTPATIENT)
Dept: PULMONOLOGY | Facility: CLINIC | Age: 69
End: 2024-08-09
Payer: MEDICARE

## 2024-08-09 VITALS
DIASTOLIC BLOOD PRESSURE: 80 MMHG | TEMPERATURE: 98.1 F | SYSTOLIC BLOOD PRESSURE: 122 MMHG | BODY MASS INDEX: 26.32 KG/M2 | OXYGEN SATURATION: 98 % | WEIGHT: 148.6 LBS | HEART RATE: 76 BPM

## 2024-08-09 DIAGNOSIS — R05.3 CHRONIC COUGH: ICD-10-CM

## 2024-08-09 PROCEDURE — 99204 OFFICE O/P NEW MOD 45 MIN: CPT | Performed by: INTERNAL MEDICINE

## 2024-08-09 RX ORDER — FAMOTIDINE 40 MG/1
40 TABLET, FILM COATED ORAL DAILY
Qty: 30 TABLET | Refills: 0 | Status: SHIPPED | OUTPATIENT
Start: 2024-08-09

## 2024-08-09 NOTE — PROGRESS NOTES
Pulmonary Consultation   Matilda Hernandez 68 y.o. female MRN: [id number]  8/9/2024    Assessment:    Chronic cough  Matilda has a mildly bothersome cough for the past year.  I think her story is most consistent with GERD as a cause, though she does also have symptoms of postnasal drip, and she does have a preceding history of a viral infection triggering all of this, so postviral cough syndrome should also be considered    Start Pepcid 40 mg daily, counseled on trying a 2-week course  Switch to toning every 12 if she finds that this is not sufficient to last throughout the course of the day  Trial 2 sprays Flonase each side daily if the above does not work  Consider steroid trial versus additional investigation if cough worsens or fails to improve  Patient unsure if she wants to go in depth with testing, left follow-up as as needed for now    Plan:    Diagnoses and all orders for this visit:    Chronic cough  -     Ambulatory Referral to Pulmonology  -     famotidine (PEPCID) 40 MG tablet; Take 1 tablet (40 mg total) by mouth daily    Return if symptoms worsen or fail to improve.    History of Present Illness   HPI:  Matilda Hernandez is a 68 y.o. female who presents for evaluation of a chronic cough.  She reports that this started 1 year ago and began after a respiratory infection.  Over the course of the past year, she thinks that it might of slightly gotten better.  She has periodic coughing spasms that will come and go.  Some triggers include laughing, laying recumbent, but episodes can occur randomly as well.  The only thing that is been beneficial for her so far is taking a drink of water to stop the coughing fits, although she reports she has not tried much else for this.  She reports that she only finds the cough to be a mild irritant, and is unsure how in-depth she wants to go with testing and medications to get this cleared up.    She has no history of seasonal or environmental allergies.  She does report  that she gets occasional sinus headaches and congestion.    She has no other exciting medical history with respect of this cough.  She does have occasional indigestion, for which she is previously undergone cholecystectomy, and for which she at the moment only takes occasional Tums.      Review of Systems   Respiratory:  Positive for cough. Negative for shortness of breath.    Allergic/Immunologic: Negative for environmental allergies.       Historical Information   Past Medical History:   Diagnosis Date   • Allergic    • Anxiety    • Chronic kidney disease    • COVID-19 06/13/2022   • COVID-19 06/13/2022   • Headache(784.0)    • Hyperlipidemia    • Recurrent UTI    • Screening for AAA (abdominal aortic aneurysm) 05/03/2018    fam hx aaa- check screen- last one done 2016- ectatic at 2.7  check labs    • Screening for cardiovascular condition 05/03/2018   • Screening mammogram for breast cancer 11/06/2023   • Tonsillitis      Past Surgical History:   Procedure Laterality Date   • BREAST BIOPSY Right 10/20/2023    mri guided biopsy   • CHOLECYSTECTOMY     • CHOLECYSTECTOMY OPEN     • COLONOSCOPY  2014   • CYSTOSCOPY  09/30/2022   • HYSTERECTOMY N/A     uterus removed age 28   • HYSTERECTOMY     • LEG SURGERY Left    • MRI BREAST BIOPSY RIGHT (ALL INCLUSIVE) Right 10/20/2023   • OOPHORECTOMY     • RADIOFREQUENCY ABLATION NERVES  10/06/2022   • SALIVARY GLAND SURGERY Right     excision of parotid tumor/gland   • SALPINGOOPHORECTOMY Bilateral     age 46   • TONSILLECTOMY     • US GUIDED THYROID BIOPSY  03/01/2022     Family History   Problem Relation Age of Onset   • Kidney cancer Mother    • Skin cancer Mother    • Kidney nephrosis Mother    • Aneurysm Father         abdominal aortic aneurysm   • Nephrolithiasis Father    • Kidney nephrosis Father    • Breast cancer Sister 53   • Cancer Sister         bone   • Breast cancer Maternal Grandmother 65        Also aunt katrin   • Cancer Maternal Grandmother    •  Hypertension Maternal Grandfather    • Diabetes Maternal Grandfather    • Sudden death Maternal Grandfather         cardiac   • Diabetes Paternal Grandfather    • Hypertension Paternal Grandfather    • Polycystic ovary syndrome Daughter    • Breast cancer Maternal Aunt 68        60s   • Cancer Maternal Aunt    • Stomach cancer Paternal Uncle    • Cancer Paternal Uncle    • Coronary artery disease Neg Hx    • Stroke Neg Hx    • Arthritis Neg Hx    • Hyperlipidemia Neg Hx        Meds/Allergies     Current Outpatient Medications:   •  busPIRone (BUSPAR) 5 mg tablet, Take 1 tablet (5 mg total) by mouth 2 (two) times a day, Disp: 60 tablet, Rfl: 5  •  cholecalciferol (VITAMIN D3) 1,000 units tablet, Take 2,000 Units by mouth daily, Disp: , Rfl:   •  diazepam (VALIUM) 5 mg tablet, Take 1 tablet (5 mg total) by mouth daily as needed for anxiety No driving with this.  No alcohol with this. Do not combine with zolpidem, Disp: 30 tablet, Rfl: 0  •  estradiol (ESTRACE) 0.1 mg/g vaginal cream, Insert 1 g into the vagina 2 (two) times a week, Disp: 42.5 g, Rfl: 0  •  famotidine (PEPCID) 40 MG tablet, Take 1 tablet (40 mg total) by mouth daily, Disp: 30 tablet, Rfl: 0  •  methocarbamol (ROBAXIN) 500 mg tablet, Take 1 tablet (500 mg total) by mouth 2 (two) times a day as needed for muscle spasms, Disp: 30 tablet, Rfl: 0  •  Multiple Vitamins-Minerals (CENTRUM SILVER ADULT 50+) TABS, Take 1 tablet by mouth daily  , Disp: , Rfl:   •  Probiotic Product (PROBIOTIC COLON SUPPORT) CAPS, Take 1 capsule by mouth daily  , Disp: , Rfl:   •  simvastatin (ZOCOR) 40 mg tablet, Take 1 tablet (40 mg total) by mouth daily, Disp: 90 tablet, Rfl: 1  •  Vaginal Lubricant (REPLENS) GEL, Insert into the vagina, Disp: , Rfl:   •  zolpidem (AMBIEN CR) 12.5 MG CR tablet, Take 1 tablet (12.5 mg total) by mouth daily at bedtime as needed for sleep, Disp: 30 tablet, Rfl: 0  Allergies   Allergen Reactions   • Augmentin [Amoxicillin-Pot Clavulanate] GI  "Intolerance   • Macrobid [Nitrofurantoin] GI Intolerance       Vitals: Blood pressure 122/80, pulse 76, temperature 98.1 °F (36.7 °C), temperature source Tympanic, weight 67.4 kg (148 lb 9.6 oz), SpO2 98%, not currently breastfeeding. Body mass index is 26.32 kg/m². Oxygen Therapy  SpO2: 98 %    Physical Exam  Physical Exam  Vitals reviewed.   Constitutional:       General: She is not in acute distress.     Appearance: Normal appearance. She is well-developed. She is not ill-appearing.   HENT:      Head: Normocephalic and atraumatic.   Eyes:      General: No scleral icterus.     Conjunctiva/sclera: Conjunctivae normal.   Neck:      Vascular: No JVD.   Cardiovascular:      Rate and Rhythm: Normal rate and regular rhythm.      Heart sounds: Normal heart sounds. No murmur heard.     No friction rub. No gallop.   Pulmonary:      Effort: Pulmonary effort is normal. No respiratory distress.      Breath sounds: Normal breath sounds. No wheezing or rales.   Musculoskeletal:      Cervical back: Neck supple.      Right lower leg: No edema.      Left lower leg: No edema.   Skin:     General: Skin is warm and dry.      Findings: No rash.   Neurological:      General: No focal deficit present.      Mental Status: She is alert and oriented to person, place, and time. Mental status is at baseline.   Psychiatric:         Mood and Affect: Mood normal.         Behavior: Behavior normal.         Labs: I have personally reviewed pertinent lab results.  Lab Results   Component Value Date    WBC 8.65 07/30/2024    HGB 13.8 07/30/2024    HCT 42.6 07/30/2024    MCV 90 07/30/2024     07/30/2024     Lab Results   Component Value Date    GLUCOSE 97 01/22/2018    CALCIUM 9.8 07/30/2024     01/22/2018    K 4.3 07/30/2024    CO2 25 07/30/2024     07/30/2024    BUN 27 (H) 07/30/2024    CREATININE 0.88 07/30/2024     No results found for: \"IGE\"  Lab Results   Component Value Date    ALT 15 07/30/2024    AST 17 07/30/2024    " ALKPHOS 65 07/30/2024    BILITOT 0.3 01/22/2018       Imaging and other studies: I have personally reviewed relevant images in PACS.    EKG, Pathology, and Other Studies: I have personally reviewed relevant reports in Epic.    Aki Hanna M.D.  [facility]

## 2024-08-09 NOTE — ASSESSMENT & PLAN NOTE
Matilda has a mildly bothersome cough for the past year.  I think her story is most consistent with GERD as a cause, though she does also have symptoms of postnasal drip, and she does have a preceding history of a viral infection triggering all of this, so postviral cough syndrome should also be considered    Start Pepcid 40 mg daily, counseled on trying a 2-week course  Switch to toning every 12 if she finds that this is not sufficient to last throughout the course of the day  Trial 2 sprays Flonase each side daily if the above does not work  Consider steroid trial versus additional investigation if cough worsens or fails to improve  Patient unsure if she wants to go in depth with testing, left follow-up as as needed for now

## 2024-08-19 ENCOUNTER — ESTABLISHED COMPREHENSIVE EXAM (OUTPATIENT)
Dept: URBAN - METROPOLITAN AREA CLINIC 6 | Facility: CLINIC | Age: 69
End: 2024-08-19

## 2024-08-19 DIAGNOSIS — H25.813: ICD-10-CM

## 2024-08-19 DIAGNOSIS — H40.013: ICD-10-CM

## 2024-08-19 DIAGNOSIS — H35.363: ICD-10-CM

## 2024-08-19 DIAGNOSIS — H43.813: ICD-10-CM

## 2024-08-19 DIAGNOSIS — Z98.890: ICD-10-CM

## 2024-08-19 PROCEDURE — 92133 CPTRZD OPH DX IMG PST SGM ON: CPT

## 2024-08-19 PROCEDURE — 92083 EXTENDED VISUAL FIELD XM: CPT

## 2024-08-19 PROCEDURE — 92014 COMPRE OPH EXAM EST PT 1/>: CPT

## 2024-08-19 ASSESSMENT — VISUAL ACUITY
OD_CC: 20/25+2
OS_CC: 20/20

## 2024-08-19 ASSESSMENT — TONOMETRY
OD_IOP_MMHG: 20
OS_IOP_MMHG: 18

## 2024-08-22 ENCOUNTER — HOSPITAL ENCOUNTER (OUTPATIENT)
Dept: RADIOLOGY | Facility: CLINIC | Age: 69
End: 2024-08-22
Payer: MEDICARE

## 2024-08-22 VITALS
DIASTOLIC BLOOD PRESSURE: 70 MMHG | OXYGEN SATURATION: 97 % | TEMPERATURE: 98.4 F | SYSTOLIC BLOOD PRESSURE: 153 MMHG | RESPIRATION RATE: 18 BRPM | HEART RATE: 61 BPM

## 2024-08-22 DIAGNOSIS — M46.1 SACROILIITIS (HCC): ICD-10-CM

## 2024-08-22 PROCEDURE — 27096 INJECT SACROILIAC JOINT: CPT | Performed by: ANESTHESIOLOGY

## 2024-08-22 RX ORDER — METHYLPREDNISOLONE ACETATE 80 MG/ML
80 INJECTION, SUSPENSION INTRA-ARTICULAR; INTRALESIONAL; INTRAMUSCULAR; PARENTERAL; SOFT TISSUE ONCE
Status: COMPLETED | OUTPATIENT
Start: 2024-08-22 | End: 2024-08-22

## 2024-08-22 RX ORDER — 0.9 % SODIUM CHLORIDE 0.9 %
2 VIAL (ML) INJECTION ONCE
Status: COMPLETED | OUTPATIENT
Start: 2024-08-22 | End: 2024-08-22

## 2024-08-22 RX ORDER — ROPIVACAINE HYDROCHLORIDE 2 MG/ML
3 INJECTION, SOLUTION EPIDURAL; INFILTRATION; PERINEURAL ONCE
Status: COMPLETED | OUTPATIENT
Start: 2024-08-22 | End: 2024-08-22

## 2024-08-22 RX ADMIN — ROPIVACAINE HYDROCHLORIDE 3 ML: 2 INJECTION, SOLUTION EPIDURAL; INFILTRATION; PERINEURAL at 09:29

## 2024-08-22 RX ADMIN — METHYLPREDNISOLONE ACETATE 80 MG: 80 INJECTION, SUSPENSION INTRA-ARTICULAR; INTRALESIONAL; INTRAMUSCULAR; PARENTERAL; SOFT TISSUE at 09:29

## 2024-08-22 RX ADMIN — IOHEXOL 1 ML: 300 INJECTION, SOLUTION INTRAVENOUS at 09:29

## 2024-08-22 RX ADMIN — SODIUM CHLORIDE 2 ML: 9 INJECTION INTRAMUSCULAR; INTRAVENOUS; SUBCUTANEOUS at 09:28

## 2024-08-22 RX ADMIN — Medication 2 ML: at 09:28

## 2024-08-22 NOTE — H&P
History of Present Illness: The patient is a 68 y.o. female who presents with complaints of right lower back pain is here today for right-sided sacroiliac joint injection    Past Medical History:   Diagnosis Date    Allergic     Anxiety     Chronic kidney disease     COVID-19 06/13/2022    COVID-19 06/13/2022    Headache(784.0)     Hyperlipidemia     Recurrent UTI     Screening for AAA (abdominal aortic aneurysm) 05/03/2018    fam hx aaa- check screen- last one done 2016- ectatic at 2.7  check labs     Screening for cardiovascular condition 05/03/2018    Screening mammogram for breast cancer 11/06/2023    Tonsillitis        Past Surgical History:   Procedure Laterality Date    BREAST BIOPSY Right 10/20/2023    mri guided biopsy    CHOLECYSTECTOMY      CHOLECYSTECTOMY OPEN      COLONOSCOPY  2014    CYSTOSCOPY  09/30/2022    HYSTERECTOMY N/A     uterus removed age 28    HYSTERECTOMY      LEG SURGERY Left     MRI BREAST BIOPSY RIGHT (ALL INCLUSIVE) Right 10/20/2023    OOPHORECTOMY      RADIOFREQUENCY ABLATION NERVES  10/06/2022    SALIVARY GLAND SURGERY Right     excision of parotid tumor/gland    SALPINGOOPHORECTOMY Bilateral     age 46    TONSILLECTOMY      US GUIDED THYROID BIOPSY  03/01/2022         Current Outpatient Medications:     busPIRone (BUSPAR) 5 mg tablet, Take 1 tablet (5 mg total) by mouth 2 (two) times a day, Disp: 60 tablet, Rfl: 5    cholecalciferol (VITAMIN D3) 1,000 units tablet, Take 2,000 Units by mouth daily, Disp: , Rfl:     diazepam (VALIUM) 5 mg tablet, Take 1 tablet (5 mg total) by mouth daily as needed for anxiety No driving with this.  No alcohol with this. Do not combine with zolpidem, Disp: 30 tablet, Rfl: 0    estradiol (ESTRACE) 0.1 mg/g vaginal cream, Insert 1 g into the vagina 2 (two) times a week, Disp: 42.5 g, Rfl: 0    famotidine (PEPCID) 40 MG tablet, Take 1 tablet (40 mg total) by mouth daily, Disp: 30 tablet, Rfl: 0    methocarbamol (ROBAXIN) 500 mg tablet, Take 1 tablet (500  mg total) by mouth 2 (two) times a day as needed for muscle spasms, Disp: 30 tablet, Rfl: 0    Multiple Vitamins-Minerals (CENTRUM SILVER ADULT 50+) TABS, Take 1 tablet by mouth daily  , Disp: , Rfl:     Probiotic Product (PROBIOTIC COLON SUPPORT) CAPS, Take 1 capsule by mouth daily  , Disp: , Rfl:     simvastatin (ZOCOR) 40 mg tablet, Take 1 tablet (40 mg total) by mouth daily, Disp: 90 tablet, Rfl: 1    Vaginal Lubricant (REPLENS) GEL, Insert into the vagina, Disp: , Rfl:     zolpidem (AMBIEN CR) 12.5 MG CR tablet, Take 1 tablet (12.5 mg total) by mouth daily at bedtime as needed for sleep, Disp: 30 tablet, Rfl: 0    Current Facility-Administered Medications:     iohexol (OMNIPAQUE) 300 mg/mL injection 1 mL, 1 mL, Intra-articular, Once, Ziyad Varma MD    lidocaine (PF) (XYLOCAINE-MPF) 2 % injection 2 mL, 2 mL, Infiltration, Once, Ziyad Varma MD    methylPREDNISolone acetate (DEPO-MEDROL) injection 80 mg, 80 mg, Intra-articular, Once, Ziyad Varma MD    ropivacaine (NAROPIN) injection 3 mL, 3 mL, Intra-articular, Once, Ziyad Varma MD    sodium chloride (PF) 0.9 % injection 2 mL, 2 mL, Infiltration, Once, Ziyad Varma MD    Allergies   Allergen Reactions    Augmentin [Amoxicillin-Pot Clavulanate] GI Intolerance    Macrobid [Nitrofurantoin] GI Intolerance       Physical Exam:   Vitals:    08/22/24 0903   BP: 145/84   Pulse: 64   Resp: 20   Temp: 98.4 °F (36.9 °C)   SpO2: 96%     General: Awake, Alert, Oriented x 3, Mood and affect appropriate  Respiratory: Respirations even and unlabored  Cardiovascular: Peripheral pulses intact; no edema  Musculoskeletal Exam: Right lower back pain    ASA Score: 3    Patient/Chart Verification  Patient ID Verified: Verbal  ID Band Applied: No  Consents Confirmed: Procedural, To be obtained in the Pre-Procedure area  Interval H&P(within 24 hr) Complete (required for Outpatients and Surgery Admit only): To be obtained in the Pre-Procedure area  Allergies Reviewed:  Yes  Anticoag/NSAID held?: NA  Currently on antibiotics?: No    Assessment:   1. Sacroiliitis (HCC)        Plan: Right SI joint injection

## 2024-08-22 NOTE — DISCHARGE INSTR - LAB

## 2024-09-05 ENCOUNTER — TELEPHONE (OUTPATIENT)
Dept: PAIN MEDICINE | Facility: CLINIC | Age: 69
End: 2024-09-05

## 2024-09-05 ENCOUNTER — HOSPITAL ENCOUNTER (OUTPATIENT)
Dept: RADIOLOGY | Facility: CLINIC | Age: 69
Discharge: HOME | End: 2024-09-05
Payer: MEDICARE

## 2024-09-05 VITALS
SYSTOLIC BLOOD PRESSURE: 141 MMHG | DIASTOLIC BLOOD PRESSURE: 93 MMHG | RESPIRATION RATE: 20 BRPM | TEMPERATURE: 97.3 F | OXYGEN SATURATION: 97 % | HEART RATE: 69 BPM

## 2024-09-05 DIAGNOSIS — M47.816 LUMBAR SPONDYLOSIS: ICD-10-CM

## 2024-09-05 DIAGNOSIS — G47.00 INSOMNIA, CONTROLLED: ICD-10-CM

## 2024-09-05 PROCEDURE — 64635 DESTROY LUMB/SAC FACET JNT: CPT | Performed by: ANESTHESIOLOGY

## 2024-09-05 PROCEDURE — 64636 DESTROY L/S FACET JNT ADDL: CPT | Performed by: ANESTHESIOLOGY

## 2024-09-05 RX ORDER — LIDOCAINE HYDROCHLORIDE 10 MG/ML
30 INJECTION, SOLUTION EPIDURAL; INFILTRATION; INTRACAUDAL; PERINEURAL ONCE
Status: COMPLETED | OUTPATIENT
Start: 2024-09-05 | End: 2024-09-05

## 2024-09-05 RX ORDER — BUPIVACAINE HCL/PF 2.5 MG/ML
10 VIAL (ML) INJECTION ONCE
Status: COMPLETED | OUTPATIENT
Start: 2024-09-05 | End: 2024-09-05

## 2024-09-05 RX ORDER — METHYLPREDNISOLONE ACETATE 80 MG/ML
80 INJECTION, SUSPENSION INTRA-ARTICULAR; INTRALESIONAL; INTRAMUSCULAR; PARENTERAL; SOFT TISSUE ONCE
Status: COMPLETED | OUTPATIENT
Start: 2024-09-05 | End: 2024-09-05

## 2024-09-05 RX ORDER — ZOLPIDEM TARTRATE 10 MG/1
10 TABLET ORAL
Qty: 30 TABLET | Refills: 0 | Status: SHIPPED | OUTPATIENT
Start: 2024-09-05

## 2024-09-05 RX ADMIN — LIDOCAINE HYDROCHLORIDE 20 ML: 10 INJECTION, SOLUTION EPIDURAL; INFILTRATION; INTRACAUDAL; PERINEURAL at 11:46

## 2024-09-05 RX ADMIN — METHYLPREDNISOLONE ACETATE 20 MG: 80 INJECTION, SUSPENSION INTRA-ARTICULAR; INTRALESIONAL; INTRAMUSCULAR; PARENTERAL; SOFT TISSUE at 11:58

## 2024-09-05 RX ADMIN — BUPIVACAINE HYDROCHLORIDE 3 ML: 2.5 INJECTION, SOLUTION EPIDURAL; INFILTRATION; INTRACAUDAL at 11:58

## 2024-09-05 NOTE — H&P
History of Present Illness: The patient is a 68 y.o. female who presents with complaints of left low back pain and is here today for left L3-5 ablation    Past Medical History:   Diagnosis Date    Allergic     Anxiety     Chronic kidney disease     COVID-19 06/13/2022    COVID-19 06/13/2022    Headache(784.0)     Hyperlipidemia     Recurrent UTI     Screening for AAA (abdominal aortic aneurysm) 05/03/2018    fam hx aaa- check screen- last one done 2016- ectatic at 2.7  check labs     Screening for cardiovascular condition 05/03/2018    Screening mammogram for breast cancer 11/06/2023    Tonsillitis        Past Surgical History:   Procedure Laterality Date    BREAST BIOPSY Right 10/20/2023    mri guided biopsy    CHOLECYSTECTOMY      CHOLECYSTECTOMY OPEN      COLONOSCOPY  2014    CYSTOSCOPY  09/30/2022    HYSTERECTOMY N/A     uterus removed age 28    HYSTERECTOMY      LEG SURGERY Left     MRI BREAST BIOPSY RIGHT (ALL INCLUSIVE) Right 10/20/2023    OOPHORECTOMY      RADIOFREQUENCY ABLATION NERVES  10/06/2022    SALIVARY GLAND SURGERY Right     excision of parotid tumor/gland    SALPINGOOPHORECTOMY Bilateral     age 46    TONSILLECTOMY      US GUIDED THYROID BIOPSY  03/01/2022         Current Outpatient Medications:     busPIRone (BUSPAR) 5 mg tablet, Take 1 tablet (5 mg total) by mouth 2 (two) times a day, Disp: 60 tablet, Rfl: 5    cholecalciferol (VITAMIN D3) 1,000 units tablet, Take 2,000 Units by mouth daily, Disp: , Rfl:     diazepam (VALIUM) 5 mg tablet, Take 1 tablet (5 mg total) by mouth daily as needed for anxiety No driving with this.  No alcohol with this. Do not combine with zolpidem, Disp: 30 tablet, Rfl: 0    estradiol (ESTRACE) 0.1 mg/g vaginal cream, Insert 1 g into the vagina 2 (two) times a week, Disp: 42.5 g, Rfl: 0    famotidine (PEPCID) 40 MG tablet, Take 1 tablet (40 mg total) by mouth daily, Disp: 30 tablet, Rfl: 0    methocarbamol (ROBAXIN) 500 mg tablet, Take 1 tablet (500 mg total) by mouth 2  (two) times a day as needed for muscle spasms, Disp: 30 tablet, Rfl: 0    Multiple Vitamins-Minerals (CENTRUM SILVER ADULT 50+) TABS, Take 1 tablet by mouth daily  , Disp: , Rfl:     Probiotic Product (PROBIOTIC COLON SUPPORT) CAPS, Take 1 capsule by mouth daily  , Disp: , Rfl:     simvastatin (ZOCOR) 40 mg tablet, Take 1 tablet (40 mg total) by mouth daily, Disp: 90 tablet, Rfl: 1    Vaginal Lubricant (REPLENS) GEL, Insert into the vagina, Disp: , Rfl:     zolpidem (AMBIEN) 10 mg tablet, Take 1 tablet (10 mg total) by mouth daily at bedtime as needed for sleep, Disp: 30 tablet, Rfl: 0    Current Facility-Administered Medications:     bupivacaine (PF) (MARCAINE) 0.25 % injection 10 mL, 10 mL, Perineural, Once, Ziyad Varma MD    lidocaine (PF) (XYLOCAINE-MPF) 1 % injection 30 mL, 30 mL, Infiltration, Once, Ziyad Varma MD    methylPREDNISolone acetate (DEPO-MEDROL) injection 80 mg, 80 mg, Perineural, Once, Ziyad Varma MD    Allergies   Allergen Reactions    Augmentin [Amoxicillin-Pot Clavulanate] GI Intolerance    Macrobid [Nitrofurantoin] GI Intolerance       Physical Exam:   Vitals:    09/05/24 1129   BP: 119/82   Pulse: 72   Resp: 20   Temp: (!) 97.3 °F (36.3 °C)   SpO2: 97%     General: Awake, Alert, Oriented x 3, Mood and affect appropriate  Respiratory: Respirations even and unlabored  Cardiovascular: Peripheral pulses intact; no edema  Musculoskeletal Exam: left low back pain    ASA Score: 3    Patient/Chart Verification  Patient ID Verified: Verbal  Consents Confirmed: To be obtained in the Pre-Procedure area  Interval H&P(within 24 hr) Complete (required for Outpatients and Surgery Admit only): To be obtained in the Procedural area  Allergies Reviewed: Yes  Anticoag/NSAID held?: NA  Currently on antibiotics?: No    Assessment:   1. Lumbar spondylosis        Plan: Left L3-L5 RFA

## 2024-09-05 NOTE — DISCHARGE INSTR - LAB

## 2024-09-06 DIAGNOSIS — E78.01 FAMILIAL HYPERCHOLESTEROLEMIA: ICD-10-CM

## 2024-09-06 RX ORDER — SIMVASTATIN 40 MG
40 TABLET ORAL DAILY
Qty: 100 TABLET | Refills: 1 | Status: SHIPPED | OUTPATIENT
Start: 2024-09-06

## 2024-09-06 NOTE — TELEPHONE ENCOUNTER
Pt reports only soreness today, pain level 1/10.  Inj site looked fine when checked by  this am.  Pt told ice or hat along w/ OTC pain meds as needed for soreness/pain.  Told pt it will take 4-6 wks to see full benefit from ablation.  Pt told to call office with any sunburn like sensations or issues.   Pt given post RFA ovs w/ Connie for 10/23.

## 2024-10-16 ENCOUNTER — APPOINTMENT (OUTPATIENT)
Dept: LAB | Facility: CLINIC | Age: 69
End: 2024-10-16
Payer: MEDICARE

## 2024-10-16 DIAGNOSIS — Z12.39 BREAST CANCER SCREENING OTHER THAN MAMMOGRAM: ICD-10-CM

## 2024-10-21 DIAGNOSIS — F40.240 CLAUSTROPHOBIA: Primary | ICD-10-CM

## 2024-10-21 RX ORDER — LORAZEPAM 1 MG/1
TABLET ORAL
Qty: 2 TABLET | Refills: 0 | Status: SHIPPED | OUTPATIENT
Start: 2024-10-21

## 2024-10-22 ENCOUNTER — APPOINTMENT (OUTPATIENT)
Dept: LAB | Facility: CLINIC | Age: 69
End: 2024-10-22
Payer: MEDICARE

## 2024-10-22 LAB
BUN SERPL-MCNC: 23 MG/DL (ref 5–25)
CREAT SERPL-MCNC: 0.76 MG/DL (ref 0.6–1.3)
GFR SERPL CREATININE-BSD FRML MDRD: 79 ML/MIN/1.73SQ M

## 2024-10-22 PROCEDURE — 84520 ASSAY OF UREA NITROGEN: CPT

## 2024-10-22 PROCEDURE — 36415 COLL VENOUS BLD VENIPUNCTURE: CPT

## 2024-10-22 PROCEDURE — 82565 ASSAY OF CREATININE: CPT

## 2024-10-23 ENCOUNTER — OFFICE VISIT (OUTPATIENT)
Dept: PAIN MEDICINE | Facility: CLINIC | Age: 69
End: 2024-10-23
Payer: MEDICARE

## 2024-10-23 ENCOUNTER — HOSPITAL ENCOUNTER (OUTPATIENT)
Dept: RADIOLOGY | Facility: HOSPITAL | Age: 69
Discharge: HOME | End: 2024-10-23
Payer: MEDICARE

## 2024-10-23 VITALS
BODY MASS INDEX: 25.52 KG/M2 | WEIGHT: 144 LBS | SYSTOLIC BLOOD PRESSURE: 142 MMHG | HEART RATE: 73 BPM | RESPIRATION RATE: 18 BRPM | DIASTOLIC BLOOD PRESSURE: 92 MMHG | HEIGHT: 63 IN

## 2024-10-23 DIAGNOSIS — M25.551 RIGHT HIP PAIN: Primary | ICD-10-CM

## 2024-10-23 DIAGNOSIS — G89.4 CHRONIC PAIN SYNDROME: ICD-10-CM

## 2024-10-23 DIAGNOSIS — M47.816 LUMBAR SPONDYLOSIS: ICD-10-CM

## 2024-10-23 DIAGNOSIS — M46.1 SACROILIITIS (HCC): ICD-10-CM

## 2024-10-23 DIAGNOSIS — M51.16 INTERVERTEBRAL DISC DISORDER WITH RADICULOPATHY OF LUMBAR REGION: ICD-10-CM

## 2024-10-23 DIAGNOSIS — M25.551 RIGHT HIP PAIN: ICD-10-CM

## 2024-10-23 PROCEDURE — 73502 X-RAY EXAM HIP UNI 2-3 VIEWS: CPT

## 2024-10-23 PROCEDURE — 99214 OFFICE O/P EST MOD 30 MIN: CPT

## 2024-10-23 NOTE — PROGRESS NOTES
Assessment:  1. Right hip pain    2. Lumbar spondylosis    3. Chronic pain syndrome    4. Sacroiliitis (HCC)    5. Intervertebral disc disorder with radiculopathy of lumbar region        Plan:  The patient is a 69-year-old female with a history of chronic pain secondary to low back pain, lumbar spondylosis and sacroiliitis who presents to the office with improved left-sided low back pain following a left-sided L3-5 radiofrequency ablation done on 9/5/2024 and worsening right-sided low back pain that is unchanged since her last office visit and unrelieved after undergoing a right-sided L3-5 radiofrequency ablation and a right SI joint injection.    At this time, I will order updated MRI imaging of her lumbar spine for further evaluation of her worsening right-sided low back pain.  I will also order x-rays of her right hip.  I did instruct patient I will call once I receive both results.    For now, I did instruct patient to add an over-the-counter anti-inflammatory to her medication regimen.    My impressions and treatment recommendations were discussed in detail with the patient who verbalized understanding and had no further questions.  Discharge instructions were provided. I personally saw and examined the patient and I agree with the above discussed plan of care.    Orders Placed This Encounter   Procedures    XR hip/pelv 2-3 vws right if performed     Standing Status:   Future     Number of Occurrences:   1     Standing Expiration Date:   10/23/2028     Scheduling Instructions:      Bring along any outside films relating to this procedure.          MRI lumbar spine wo contrast     Standing Status:   Future     Standing Expiration Date:   10/23/2028     Scheduling Instructions:      There is no preparation for this test. Please leave your jewelry and valuables at home, wedding rings are the exception. All patients will be required to change into a hospital gown and pants.  Street clothes are not permitted in the  MRI.  Magnetic nail polish must be removed prior to arrival for your test. Please bring your insurance cards, a form of photo ID and a list of your medications with you. Arrive 15 minutes prior to your appointment time in order to register. Please bring any prior CT or MRI studies of this area that were not performed at a [facility].            To schedule this appointment, please contact Central Scheduling at [phone].            Prior to your appointment, please make sure you complete the MRI Screening Form when you e-Check in for your appointment. This will be available starting 7 days before your appointment in [employer]. You may receive an e-mail with an activation code if you do not have a [employer] account. If you do not have access to a device, we will complete your screening at your appointment.     Order Specific Question:   Reason for Exam     Answer:   Worsening right sided low back pain     Order Specific Question:   What is the patient's sedation requirement?     Answer:   No Sedation     Order Specific Question:   Does the patient need medication for Claustrophobia? If yes, order medication at this point.     Answer:   No     Order Specific Question:   Does the patient wear a life vest, have an implanted cardiac device, a stimulation device, a sleep apnea stimulator, or a breast tissue expansion device?     Answer:   No     Order Specific Question:   Release to patient through [email or web address]     Answer:   Immediate     No orders of the defined types were placed in this encounter.      History of Present Illness:  Matilda Hernandez is a 69 y.o. female with a history of chronic pain secondary to low back pain, lumbar spondylosis and sacroiliitis.  She was last seen on 9/5/2024 where she underwent a left-sided L3-5 radiofrequency ablation with which has provided her greater than 50% relief of her left-sided low back pain.  She presents to the office with improved left-sided low back pain and  worsening right-sided low back pain and pain that radiates into the right hip.  She had undergone a right sided L3-5 radiofrequency ablation as well as a right SI joint injection without relief of her right-sided low back pain.  Patient has previously done physical therapy which was ineffective and made her low back pain worse.    She states her pain is constant.  She states her pain is worse with activity.  She rates quality of her pain as pressure-like and is currently rating her pain a 7/10 on a numeric scale.    She is not currently taking anything for pain.    I have personally reviewed and/or updated the patient's past medical history, past surgical history, family history, social history, current medications, allergies, and vital signs today.     Review of Systems   Respiratory:  Negative for shortness of breath.    Cardiovascular:  Negative for chest pain.   Gastrointestinal:  Negative for constipation, diarrhea, nausea and vomiting.   Musculoskeletal:  Positive for back pain and gait problem. Negative for arthralgias, joint swelling and myalgias.        Bilateral Hip pain Right side is worse   Skin:  Negative for rash.   Neurological:  Negative for dizziness, seizures and weakness.   All other systems reviewed and are negative.      Patient Active Problem List   Diagnosis    Sleep disturbance    PROSPER (generalized anxiety disorder)    Atrophic vaginitis    Chronic left-sided low back pain without sciatica    Eustachian tube disorder, bilateral    Psychophysiological insomnia    MVP (mitral valve prolapse)    Nephrolithiasis    Age-related osteoporosis without current pathological fracture    Mixed hyperlipidemia    Thyroid nodule    Renal cyst, acquired, left    Glaucoma suspect of both eyes    Peripheral nerve entrapment syndrome    Lumbar spondylosis    Intervertebral disc disorder with radiculopathy of lumbar region    Sacroiliitis (HCC)    Familial hypercholesterolemia    Chronic cough    Discharge from  right nipple    Family history of breast cancer    Abnormal MRI, breast    Fibrocystic breast changes, right       Past Medical History:   Diagnosis Date    Allergic     Anxiety     Chronic kidney disease     COVID-19 06/13/2022    COVID-19 06/13/2022    Headache(784.0)     Hyperlipidemia     Recurrent UTI     Screening for AAA (abdominal aortic aneurysm) 05/03/2018    fam hx aaa- check screen- last one done 2016- ectatic at 2.7  check labs     Screening for cardiovascular condition 05/03/2018    Screening mammogram for breast cancer 11/06/2023    Tonsillitis        Past Surgical History:   Procedure Laterality Date    BREAST BIOPSY Right 10/20/2023    mri guided biopsy    CHOLECYSTECTOMY      CHOLECYSTECTOMY OPEN      COLONOSCOPY  2014    CYSTOSCOPY  09/30/2022    HYSTERECTOMY N/A     uterus removed age 28    HYSTERECTOMY      LEG SURGERY Left     MRI BREAST BIOPSY RIGHT (ALL INCLUSIVE) Right 10/20/2023    OOPHORECTOMY      RADIOFREQUENCY ABLATION NERVES  10/06/2022    SALIVARY GLAND SURGERY Right     excision of parotid tumor/gland    SALPINGOOPHORECTOMY Bilateral     age 46    TONSILLECTOMY      US GUIDED THYROID BIOPSY  03/01/2022       Family History   Problem Relation Age of Onset    Kidney cancer Mother     Skin cancer Mother     Kidney nephrosis Mother     Aneurysm Father         abdominal aortic aneurysm    Nephrolithiasis Father     Kidney nephrosis Father     Breast cancer Sister 53    Cancer Sister         bone    Breast cancer Maternal Grandmother 65        Also aunt katrin    Cancer Maternal Grandmother     Hypertension Maternal Grandfather     Diabetes Maternal Grandfather     Sudden death Maternal Grandfather         cardiac    Diabetes Paternal Grandfather     Hypertension Paternal Grandfather     Polycystic ovary syndrome Daughter     Breast cancer Maternal Aunt 68        60s    Cancer Maternal Aunt     Stomach cancer Paternal Uncle     Cancer Paternal Uncle     Coronary artery disease Neg Hx      Stroke Neg Hx     Arthritis Neg Hx     Hyperlipidemia Neg Hx        Social History     Occupational History    Not on file   Tobacco Use    Smoking status: Never    Smokeless tobacco: Never   Vaping Use    Vaping status: Never Used   Substance and Sexual Activity    Alcohol use: No    Drug use: No    Sexual activity: Yes     Partners: Male     Birth control/protection: Post-menopausal       Current Outpatient Medications on File Prior to Visit   Medication Sig    busPIRone (BUSPAR) 5 mg tablet Take 1 tablet (5 mg total) by mouth 2 (two) times a day    cholecalciferol (VITAMIN D3) 1,000 units tablet Take 2,000 Units by mouth daily    diazepam (VALIUM) 5 mg tablet Take 1 tablet (5 mg total) by mouth daily as needed for anxiety No driving with this.  No alcohol with this. Do not combine with zolpidem    estradiol (ESTRACE) 0.1 mg/g vaginal cream Insert 1 g into the vagina 2 (two) times a week    famotidine (PEPCID) 40 MG tablet Take 1 tablet (40 mg total) by mouth daily    LORazepam (ATIVAN) 1 mg tablet Take 1 tablet 1 hour prior to MRI.  May repeat immediately prior if necessary.  DO NOT TAKE WITHIN 24 HOURS OF AMBIEN.    methocarbamol (ROBAXIN) 500 mg tablet Take 1 tablet (500 mg total) by mouth 2 (two) times a day as needed for muscle spasms    Multiple Vitamins-Minerals (CENTRUM SILVER ADULT 50+) TABS Take 1 tablet by mouth daily      Probiotic Product (PROBIOTIC COLON SUPPORT) CAPS Take 1 capsule by mouth daily      simvastatin (ZOCOR) 40 mg tablet Take 1 tablet (40 mg total) by mouth daily    Vaginal Lubricant (REPLENS) GEL Insert into the vagina    zolpidem (AMBIEN) 10 mg tablet Take 1 tablet (10 mg total) by mouth daily at bedtime as needed for sleep     No current facility-administered medications on file prior to visit.       Allergies   Allergen Reactions    Augmentin [Amoxicillin-Pot Clavulanate] GI Intolerance    Macrobid [Nitrofurantoin] GI Intolerance       Physical Exam:    /92   Pulse 73   Resp  "18   Ht 5' 3\" (1.6 m)   Wt 65.3 kg (144 lb)   BMI 25.51 kg/m²     Constitutional:normal, well developed, well nourished, alert, in no distress and non-toxic and no overt pain behavior.  Eyes:anicteric  HEENT:grossly intact  Neck:supple, symmetric, trachea midline and no masses   Pulmonary:even and unlabored  Cardiovascular:No edema or pitting edema present  Skin:Normal without rashes or lesions and well hydrated  Psychiatric:Mood and affect appropriate  Neurologic:Cranial Nerves II-XII grossly intact  Musculoskeletal:antalgic    Lumbar Spine Exam    Appearance:  Normal lordosis  Palpation/Tenderness:  right lumbar paraspinal tenderness  Sensory:  no sensory deficits noted  Range of Motion:  Flexion:  Minimally limited  with pain  Extension:  Minimally limited  with pain  Motor Strength:  Left hip flexion:  5/5  Right hip flexion:  5/5  Left knee flexion:  5/5  Left knee extension:  5/5  Right knee flexion:  5/5  Right knee extension:  5/5  Left foot dorsiflexion:  5/5  Left foot plantar flexion:  5/5  Right foot dorsiflexion:  5/5  Right foot plantar flexion:  5/5    Imaging    "

## 2024-10-29 ENCOUNTER — HOSPITAL ENCOUNTER (OUTPATIENT)
Dept: RADIOLOGY | Facility: HOSPITAL | Age: 69
Discharge: HOME | End: 2024-10-29

## 2024-10-29 DIAGNOSIS — Z91.89 INCREASED RISK OF BREAST CANCER: ICD-10-CM

## 2024-10-29 DIAGNOSIS — Z12.39 BREAST CANCER SCREENING OTHER THAN MAMMOGRAM: ICD-10-CM

## 2024-10-29 DIAGNOSIS — Z13.79 ASHKENAZI JEWISH ANCESTRY REQUIRING POPULATION-SPECIFIC GENETIC SCREENING: ICD-10-CM

## 2024-10-29 DIAGNOSIS — Z80.3 FAMILY HISTORY OF BREAST CANCER: ICD-10-CM

## 2024-10-30 ENCOUNTER — HOSPITAL ENCOUNTER (OUTPATIENT)
Dept: RADIOLOGY | Facility: HOSPITAL | Age: 69
Discharge: HOME | End: 2024-10-30

## 2024-10-30 ENCOUNTER — HOSPITAL ENCOUNTER (OUTPATIENT)
Dept: RADIOLOGY | Facility: HOSPITAL | Age: 69
Discharge: HOME | End: 2024-10-30
Payer: MEDICARE

## 2024-10-30 DIAGNOSIS — Z80.3 FAMILY HISTORY OF BREAST CANCER: ICD-10-CM

## 2024-10-30 DIAGNOSIS — Z12.39 BREAST CANCER SCREENING OTHER THAN MAMMOGRAM: ICD-10-CM

## 2024-10-30 DIAGNOSIS — Z13.79 ASHKENAZI JEWISH ANCESTRY REQUIRING POPULATION-SPECIFIC GENETIC SCREENING: ICD-10-CM

## 2024-10-30 DIAGNOSIS — Z91.89 INCREASED RISK OF BREAST CANCER: ICD-10-CM

## 2024-10-30 PROCEDURE — C8937 CAD BREAST MRI: HCPCS

## 2024-10-30 PROCEDURE — A9585 GADOBUTROL INJECTION: HCPCS | Performed by: INTERNAL MEDICINE

## 2024-10-30 PROCEDURE — C8908 MRI W/O FOL W/CONT, BREAST,: HCPCS

## 2024-10-30 RX ORDER — GADOBUTROL 604.72 MG/ML
6 INJECTION INTRAVENOUS
Status: COMPLETED | OUTPATIENT
Start: 2024-10-30 | End: 2024-10-30

## 2024-10-30 RX ADMIN — GADOBUTROL 6 ML: 604.72 INJECTION INTRAVENOUS at 17:11

## 2024-11-05 ENCOUNTER — ANNUAL EXAM (OUTPATIENT)
Dept: GYNECOLOGY | Facility: CLINIC | Age: 69
End: 2024-11-05
Payer: MEDICARE

## 2024-11-05 VITALS
HEIGHT: 63 IN | DIASTOLIC BLOOD PRESSURE: 80 MMHG | BODY MASS INDEX: 25.87 KG/M2 | WEIGHT: 146 LBS | SYSTOLIC BLOOD PRESSURE: 110 MMHG | HEART RATE: 67 BPM

## 2024-11-05 DIAGNOSIS — N95.2 ATROPHIC VAGINITIS: ICD-10-CM

## 2024-11-05 DIAGNOSIS — Z01.419 ENCOUNTER FOR GYNECOLOGICAL EXAMINATION WITHOUT ABNORMAL FINDING: Primary | ICD-10-CM

## 2024-11-05 PROCEDURE — G0101 CA SCREEN;PELVIC/BREAST EXAM: HCPCS | Performed by: OBSTETRICS & GYNECOLOGY

## 2024-11-05 NOTE — PROGRESS NOTES
"Ambulatory Visit  Name: Matilda Hernandez      : 1955      MRN: [id number]  Encounter Provider: Pawan Sharma DO  Encounter Date: 2024   Encounter department: [facility]    Assessment & Plan  Encounter for gynecological examination without abnormal finding         Atrophic vaginitis           History of Present Illness     Matilda Hernandez is a 69 y.o. female who presents patient presents for GYN exam.  She is status post total abdominal hysterectomy along with bilateral salpingo-oophorectomy.  She denies any vaginal discharge or bleeding.  She is experiencing dyspareunia.  Denies any dysuria, hematuria urgency or urinary incontinence.  No GI complaints.    Colonoscopy     DEXA scan 2023.  Osteoporosis.  Patient declines treatment per PCPs note          Review of Systems   Constitutional: Negative.    HENT:  Negative for sore throat and trouble swallowing.    Gastrointestinal: Negative.    Genitourinary: Negative.            Objective     /80   Pulse 67   Ht 5' 3\" (1.6 m)   Wt 66.2 kg (146 lb)   BMI 25.86 kg/m²     Physical Exam  Vitals reviewed.   Cardiovascular:      Rate and Rhythm: Normal rate and regular rhythm.      Pulses: Normal pulses.      Heart sounds: Normal heart sounds. No murmur heard.  Pulmonary:      Effort: Pulmonary effort is normal. No respiratory distress.      Breath sounds: Normal breath sounds.   Chest:   Breasts:     Right: No swelling, bleeding, inverted nipple, mass, nipple discharge, skin change or tenderness.      Left: No swelling, bleeding, inverted nipple, mass, nipple discharge, skin change or tenderness.   Abdominal:      General: There is no distension.      Palpations: Abdomen is soft. There is no mass.      Tenderness: There is no abdominal tenderness. There is no guarding or rebound.      Hernia: No hernia is present. There is no hernia in the left inguinal area or right inguinal area. "   Genitourinary:     General: Normal vulva.      Labia:         Right: No rash, tenderness or lesion.         Left: No rash, tenderness or lesion.       Vagina: Normal.      Comments: Uterus and cervix surgically absent.  No palpable adnexal masses or tenderness.  Vagina with atrophic changes.  Bartholin's and Old Stine's glands normal.  Urethral orifice normal.  No cystocele or rectocele.  Musculoskeletal:      Cervical back: Normal range of motion and neck supple. No tenderness.   Lymphadenopathy:      Cervical: No cervical adenopathy.      Upper Body:      Right upper body: No supraclavicular, axillary or pectoral adenopathy.      Left upper body: No supraclavicular, axillary or pectoral adenopathy.      Lower Body: No right inguinal adenopathy. No left inguinal adenopathy.   Neurological:      Mental Status: She is alert.

## 2024-11-08 ENCOUNTER — HOSPITAL ENCOUNTER (OUTPATIENT)
Dept: MRI IMAGING | Facility: HOSPITAL | Age: 69
Discharge: HOME | End: 2024-11-08
Payer: MEDICARE

## 2024-11-08 DIAGNOSIS — M51.16 INTERVERTEBRAL DISC DISORDER WITH RADICULOPATHY OF LUMBAR REGION: ICD-10-CM

## 2024-11-08 PROCEDURE — 72148 MRI LUMBAR SPINE W/O DYE: CPT

## 2024-11-11 ENCOUNTER — TELEPHONE (OUTPATIENT)
Dept: PAIN MEDICINE | Facility: CLINIC | Age: 69
End: 2024-11-11

## 2024-11-11 NOTE — TELEPHONE ENCOUNTER
Please let patient know I reviewed the MRI lumbar spine and the only thing I really see is bilateral L5-S1 there is arthritis but it is pinching the nerve as it exits at L5-S1 so that could be the culprit of her right-sided pain.  She has a couple small cysts in the kidney that are stable and nothing to worry about.    Would recommend a right L5-S1 transforaminal epidural steroid injection targeting the foraminal stenosis and nerve impingement at that level.  If amenable, please send to scheduling team

## 2024-11-11 NOTE — TELEPHONE ENCOUNTER
S/w pt, informed her of MRI results and suggestion. Pt would like to move forward with right L5-S1 TFESI. Pt denies any blood thinning medications. Pt is not diabetic.

## 2024-11-12 ENCOUNTER — TELEPHONE (OUTPATIENT)
Dept: RADIOLOGY | Facility: CLINIC | Age: 69
End: 2024-11-12

## 2024-11-12 NOTE — TELEPHONE ENCOUNTER
Please let patient know that I did see the addendum on the report and I will review with her when she comes in for her appointment for the injection

## 2024-11-13 NOTE — TELEPHONE ENCOUNTER
Patient has been scheduled for their procedure, please see [employer] message for additional details.

## 2024-11-20 ENCOUNTER — HOSPITAL ENCOUNTER (OUTPATIENT)
Dept: RADIOLOGY | Facility: CLINIC | Age: 69
Discharge: HOME | End: 2024-11-20
Payer: MEDICARE

## 2024-11-20 VITALS
DIASTOLIC BLOOD PRESSURE: 60 MMHG | TEMPERATURE: 98.3 F | HEART RATE: 71 BPM | SYSTOLIC BLOOD PRESSURE: 164 MMHG | RESPIRATION RATE: 20 BRPM | OXYGEN SATURATION: 95 %

## 2024-11-20 DIAGNOSIS — M51.16 INTERVERTEBRAL DISC DISORDER WITH RADICULOPATHY OF LUMBAR REGION: ICD-10-CM

## 2024-11-20 PROCEDURE — 64484 NJX AA&/STRD TFRM EPI L/S EA: CPT | Performed by: ANESTHESIOLOGY

## 2024-11-20 PROCEDURE — 64483 NJX AA&/STRD TFRM EPI L/S 1: CPT | Performed by: ANESTHESIOLOGY

## 2024-11-20 RX ORDER — METHYLPREDNISOLONE ACETATE 80 MG/ML
80 INJECTION, SUSPENSION INTRA-ARTICULAR; INTRALESIONAL; INTRAMUSCULAR; PARENTERAL; SOFT TISSUE ONCE
Status: COMPLETED | OUTPATIENT
Start: 2024-11-20 | End: 2024-11-20

## 2024-11-20 RX ORDER — 0.9 % SODIUM CHLORIDE 0.9 %
4 VIAL (ML) INJECTION ONCE
Status: COMPLETED | OUTPATIENT
Start: 2024-11-20 | End: 2024-11-20

## 2024-11-20 RX ORDER — BUPIVACAINE HCL/PF 2.5 MG/ML
2 VIAL (ML) INJECTION ONCE
Status: COMPLETED | OUTPATIENT
Start: 2024-11-20 | End: 2024-11-20

## 2024-11-20 RX ADMIN — LIDOCAINE HYDROCHLORIDE 4 ML: 20 INJECTION, SOLUTION EPIDURAL; INFILTRATION; INTRACAUDAL at 09:16

## 2024-11-20 RX ADMIN — METHYLPREDNISOLONE ACETATE 80 MG: 80 INJECTION, SUSPENSION INTRA-ARTICULAR; INTRALESIONAL; INTRAMUSCULAR; SOFT TISSUE at 09:19

## 2024-11-20 RX ADMIN — BUPIVACAINE HYDROCHLORIDE 2 ML: 2.5 INJECTION, SOLUTION EPIDURAL; INFILTRATION; INTRACAUDAL at 09:19

## 2024-11-20 RX ADMIN — IOHEXOL 1 ML: 300 INJECTION, SOLUTION INTRAVENOUS at 09:19

## 2024-11-20 RX ADMIN — Medication 4 ML: at 09:16

## 2024-11-20 NOTE — DISCHARGE INSTR - LAB
Epidural Steroid Injection   WHAT YOU NEED TO KNOW:   An epidural steroid injection (LAURI) is a procedure to inject steroid medicine into the epidural space. The epidural space is between your spinal cord and vertebrae. Steroids reduce inflammation and fluid buildup in your spine that may be causing pain. You may be given pain medicine along with the steroids.          ACTIVITY  Do not drive or operate machinery today.  No strenuous activity today - bending, lifting, etc.  You may resume normal activites starting tomorrow - start slowly and as tolerated.  You may shower today, but no tub baths or hot tubs.  You may have numbness for several hours from the local anesthetic. Please use caution and common sense, especially with weight-bearing activities.    CARE OF THE INJECTION SITE  If you have soreness or pain, apply ice to the area today (20 minutes on/20 minutes off).  Starting tomorrow, you may use warm, moist heat or ice if needed.  You may have an increase or change in your discomfort for 36-48 hours after your treatment.  Apply ice and continue with any pain medication you have been prescribed.  Notify the [facility] if you have any of the following: redness, drainage, swelling, headache, stiff neck or fever above 100°F.    SPECIAL INSTRUCTIONS  Our office will contact you in approximately 14 days for a progress report.    MEDICATIONS  Continue to take all routine medications.  Our office may have instructed you to hold some medications.    As no general anesthesia was used in today's procedure, you should not experience any side effects related to anesthesia.     If you are diabetic, the steroids used in today's injection may temporarily increase your blood sugar levels after the first few days after your injection. Please keep a close eye on your sugars and alert the doctor who manages your diabetes if your sugars are significantly high from your baseline or you are symptomatic.     If you have a  problem specifically related to your procedure, please call our office at [phone].  Problems not related to your procedure should be directed to your primary care physician.

## 2024-11-20 NOTE — H&P
History of Present Illness: The patient is a 69 y.o. female who presents with complaints of right lower back and leg pain is here today for right L5-S1 transforaminal epidural steroid injection    Past Medical History:   Diagnosis Date    Allergic     Anxiety     Chronic kidney disease     COVID-19 06/13/2022    COVID-19 06/13/2022    Headache(784.0)     Hyperlipidemia     Menopause ovarian failure     Recurrent UTI     Screening for AAA (abdominal aortic aneurysm) 05/03/2018    fam hx aaa- check screen- last one done 2016- ectatic at 2.7  check labs     Screening for cardiovascular condition 05/03/2018    Screening mammogram for breast cancer 11/06/2023    Tonsillitis        Past Surgical History:   Procedure Laterality Date    BREAST BIOPSY Right 10/20/2023    mri guided biopsy    CHOLECYSTECTOMY      CHOLECYSTECTOMY OPEN      COLONOSCOPY  2014    CYSTOSCOPY  09/30/2022    HYSTERECTOMY N/A     uterus removed age 28    HYSTERECTOMY      LEG SURGERY Left     MRI BREAST BIOPSY RIGHT (ALL INCLUSIVE) Right 10/20/2023    OOPHORECTOMY      RADIOFREQUENCY ABLATION NERVES  10/06/2022    SALIVARY GLAND SURGERY Right     excision of parotid tumor/gland    SALPINGOOPHORECTOMY Bilateral     age 46    TONSILLECTOMY      US GUIDED THYROID BIOPSY  03/01/2022         Current Outpatient Medications:     busPIRone (BUSPAR) 5 mg tablet, Take 1 tablet (5 mg total) by mouth 2 (two) times a day, Disp: 60 tablet, Rfl: 5    cholecalciferol (VITAMIN D3) 1,000 units tablet, Take 2,000 Units by mouth daily, Disp: , Rfl:     diazepam (VALIUM) 5 mg tablet, Take 1 tablet (5 mg total) by mouth daily as needed for anxiety No driving with this.  No alcohol with this. Do not combine with zolpidem (Patient taking differently: Take 5 mg by mouth daily as needed for anxiety No driving with this.  No alcohol with this. Do not combine with zolpidem as needed), Disp: 30 tablet, Rfl: 0    estradiol (ESTRACE) 0.1 mg/g vaginal cream, Insert 1 g into the  vagina 2 (two) times a week, Disp: 42.5 g, Rfl: 0    famotidine (PEPCID) 40 MG tablet, Take 1 tablet (40 mg total) by mouth daily (Patient not taking: Reported on 11/5/2024), Disp: 30 tablet, Rfl: 0    LORazepam (ATIVAN) 1 mg tablet, Take 1 tablet 1 hour prior to MRI.  May repeat immediately prior if necessary.  DO NOT TAKE WITHIN 24 HOURS OF AMBIEN. (Patient not taking: Reported on 11/5/2024), Disp: 2 tablet, Rfl: 0    methocarbamol (ROBAXIN) 500 mg tablet, Take 1 tablet (500 mg total) by mouth 2 (two) times a day as needed for muscle spasms (Patient taking differently: Take 500 mg by mouth 2 (two) times a day as needed for muscle spasms As needed), Disp: 30 tablet, Rfl: 0    Multiple Vitamins-Minerals (CENTRUM SILVER ADULT 50+) TABS, Take 1 tablet by mouth daily  , Disp: , Rfl:     Probiotic Product (PROBIOTIC COLON SUPPORT) CAPS, Take 1 capsule by mouth daily  , Disp: , Rfl:     simvastatin (ZOCOR) 40 mg tablet, Take 1 tablet (40 mg total) by mouth daily, Disp: 100 tablet, Rfl: 1    Vaginal Lubricant (REPLENS) GEL, Insert into the vagina, Disp: , Rfl:     zolpidem (AMBIEN) 10 mg tablet, Take 1 tablet (10 mg total) by mouth daily at bedtime as needed for sleep (Patient taking differently: Take 10 mg by mouth daily at bedtime as needed for sleep As needed), Disp: 30 tablet, Rfl: 0  No current facility-administered medications for this encounter.    Allergies   Allergen Reactions    Augmentin [Amoxicillin-Pot Clavulanate] GI Intolerance    Macrobid [Nitrofurantoin] GI Intolerance       Physical Exam:   Vitals:    11/20/24 0900   BP: (!) 144/102   Pulse: 66   Resp: 20   Temp: 98.3 °F (36.8 °C)   SpO2: 96%     General: Awake, Alert, Oriented x 3, Mood and affect appropriate  Respiratory: Respirations even and unlabored  Cardiovascular: Peripheral pulses intact; no edema  Musculoskeletal Exam: Right lower back and leg pain    ASA Score: 3    Patient/Chart Verification  Patient ID Verified: Verbal  Consents Confirmed:  To be obtained in the Pre-Procedure area  Interval H&P(within 24 hr) Complete (required for Outpatients and Surgery Admit only): To be obtained in the Procedural area  Allergies Reviewed: Yes  Anticoag/NSAID held?: NA  Currently on antibiotics?: No    Assessment:   1. Intervertebral disc disorder with radiculopathy of lumbar region        Plan: right L5-S1 TFESI

## 2024-11-25 DIAGNOSIS — N95.2 ATROPHIC VAGINITIS: ICD-10-CM

## 2024-11-26 RX ORDER — ESTRADIOL 0.1 MG/G
1 CREAM VAGINAL 2 TIMES WEEKLY
Qty: 42.5 G | Refills: 1 | Status: SHIPPED | OUTPATIENT
Start: 2024-11-28

## 2024-12-04 ENCOUNTER — TELEPHONE (OUTPATIENT)
Dept: RADIOLOGY | Facility: MEDICAL CENTER | Age: 69
End: 2024-12-04

## 2024-12-04 RX ORDER — ONDANSETRON 4 MG/1
4 TABLET, ORALLY DISINTEGRATING ORAL EVERY 8 HOURS PRN
COMMUNITY
Start: 2024-12-02

## 2024-12-04 RX ORDER — PREDNISONE 20 MG/1
20 TABLET ORAL DAILY
COMMUNITY
Start: 2024-12-02 | End: 2024-12-07

## 2024-12-04 RX ORDER — BENZONATATE 100 MG/1
100 CAPSULE ORAL 3 TIMES DAILY PRN
COMMUNITY
Start: 2024-12-02 | End: 2024-12-12

## 2024-12-04 RX ORDER — FLUTICASONE PROPIONATE 50 MCG
2 SPRAY, SUSPENSION (ML) NASAL DAILY
COMMUNITY
Start: 2024-12-02

## 2024-12-04 NOTE — TELEPHONE ENCOUNTER
Patient states that she is not feeling well at all. She is not active due to not feeling well. She will call back when she is able to determine how much improvement she has gotten.

## 2024-12-05 ENCOUNTER — TELEPHONE (OUTPATIENT)
Dept: INTERNAL MEDICINE CLINIC | Facility: CLINIC | Age: 69
End: 2024-12-05

## 2024-12-05 ENCOUNTER — OFFICE VISIT (OUTPATIENT)
Dept: INTERNAL MEDICINE CLINIC | Facility: CLINIC | Age: 69
End: 2024-12-05
Payer: MEDICARE

## 2024-12-05 VITALS
WEIGHT: 142 LBS | DIASTOLIC BLOOD PRESSURE: 100 MMHG | OXYGEN SATURATION: 93 % | HEART RATE: 85 BPM | BODY MASS INDEX: 25.15 KG/M2 | SYSTOLIC BLOOD PRESSURE: 148 MMHG

## 2024-12-05 DIAGNOSIS — R93.89 CHEST X-RAY ABNORMALITY: ICD-10-CM

## 2024-12-05 DIAGNOSIS — J20.9 ACUTE BRONCHITIS, UNSPECIFIED ORGANISM: Primary | ICD-10-CM

## 2024-12-05 DIAGNOSIS — M53.3 MASS OF SACRUM: ICD-10-CM

## 2024-12-05 PROCEDURE — G2211 COMPLEX E/M VISIT ADD ON: HCPCS | Performed by: INTERNAL MEDICINE

## 2024-12-05 PROCEDURE — 99214 OFFICE O/P EST MOD 30 MIN: CPT | Performed by: INTERNAL MEDICINE

## 2024-12-05 RX ORDER — HYDROCODONE POLISTIREX AND CHLORPHENIRAMINE POLISTIREX 10; 8 MG/5ML; MG/5ML
5 SUSPENSION, EXTENDED RELEASE ORAL EVERY 12 HOURS PRN
Qty: 115 ML | Refills: 0 | Status: SHIPPED | OUTPATIENT
Start: 2024-12-05

## 2024-12-05 RX ORDER — DOXYCYCLINE 100 MG/1
100 CAPSULE ORAL EVERY 12 HOURS SCHEDULED
Qty: 20 CAPSULE | Refills: 0 | Status: SHIPPED | OUTPATIENT
Start: 2024-12-05 | End: 2024-12-15

## 2024-12-05 RX ORDER — HYDROCODONE POLISTIREX AND CHLORPHENIRAMINE POLISTIREX 10; 8 MG/5ML; MG/5ML
5 SUSPENSION, EXTENDED RELEASE ORAL EVERY 12 HOURS PRN
Qty: 115 ML | Refills: 0 | Status: SHIPPED | OUTPATIENT
Start: 2024-12-05 | End: 2024-12-05 | Stop reason: SDUPTHER

## 2024-12-05 NOTE — TELEPHONE ENCOUNTER
Hydrocod Evens-Chlorphe Evens ER (TUSSIONEX) 10-8 mg/5 mL ER suspension [[id number]]      They checked with local pharmacy and they do not have this can this be sent to [facility] on [address] instead.      Clerical call when this is done

## 2024-12-05 NOTE — PROGRESS NOTES
"Name: Matilda Hernandez      : 1955      MRN: [id number]  Encounter Provider: Lea Reyes, MD  Encounter Date: 2024   Encounter department: [facility]    Assessment & Plan  Acute bronchitis, unspecified organism    Orders:    Hydrocod Evens-Chlorphe Evens ER (TUSSIONEX) 10-8 mg/5 mL ER suspension; Take 5 mL by mouth every 12 (twelve) hours as needed for cough Max Daily Amount: 10 mL    doxycycline hyclate (VIBRAMYCIN) 100 mg capsule; Take 1 capsule (100 mg total) by mouth every 12 (twelve) hours for 10 days    Chest x-ray abnormality  Right midlung density of uncertain significance seen on recent x-ray  We agree that it is best to better define this on CT scan  Orders:    CT chest wo contrast; Future    Mass of sacrum  S3 hyperintense signal noted in recent MRI of the lumbar spine likely present from MRI in   6 to 12-month follow-up MRI recommended  Orders:    MRI pelvis sacrum,coccyx, si jts wo contrast; Future         History of Present Illness     Nasal and chest congestion cough nausea started a week ago  She went to urgent care 3 days ago and chest x-ray did not show pneumonia but there was a right midlung density of uncertain significance  She was prescribed prednisone 20 mg daily for 5 days, Zofran, Flonase, benzonatate  She feels a little better but cough remains severe it is dry  She has also noted some \"spots\" in her palate      Review of Systems   Constitutional:  Negative for chills and fever.   Respiratory:  Positive for cough and wheezing. Negative for shortness of breath.    Cardiovascular:  Positive for chest pain.   Gastrointestinal:  Positive for nausea. Negative for abdominal pain, constipation and diarrhea.   Musculoskeletal:  Positive for back pain.     Past Medical History:   Diagnosis Date    Allergic     Anxiety     Chronic kidney disease     COVID-19 2022    COVID-19 2022    Headache(784.0)     Hyperlipidemia     Menopause ovarian failure  "    Recurrent UTI     Screening for AAA (abdominal aortic aneurysm) 05/03/2018    fam hx aaa- check screen- last one done 2016- ectatic at 2.7  check labs     Screening for cardiovascular condition 05/03/2018    Screening mammogram for breast cancer 11/06/2023    Tonsillitis      Past Surgical History:   Procedure Laterality Date    BREAST BIOPSY Right 10/20/2023    mri guided biopsy    CHOLECYSTECTOMY      CHOLECYSTECTOMY OPEN      COLONOSCOPY  2014    CYSTOSCOPY  09/30/2022    HYSTERECTOMY N/A     uterus removed age 28    HYSTERECTOMY      LEG SURGERY Left     MRI BREAST BIOPSY RIGHT (ALL INCLUSIVE) Right 10/20/2023    OOPHORECTOMY      RADIOFREQUENCY ABLATION NERVES  10/06/2022    SALIVARY GLAND SURGERY Right     excision of parotid tumor/gland    SALPINGOOPHORECTOMY Bilateral     age 46    TONSILLECTOMY      US GUIDED THYROID BIOPSY  03/01/2022     Family History   Problem Relation Age of Onset    Kidney cancer Mother     Skin cancer Mother     Kidney nephrosis Mother     Aneurysm Father         abdominal aortic aneurysm    Nephrolithiasis Father     Kidney nephrosis Father     Breast cancer Sister 53    Cancer Sister         bone    Breast cancer Maternal Grandmother 65        Also aunt katrin    Cancer Maternal Grandmother     Hypertension Maternal Grandfather     Diabetes Maternal Grandfather     Sudden death Maternal Grandfather         cardiac    Diabetes Paternal Grandfather     Hypertension Paternal Grandfather     Polycystic ovary syndrome Daughter     Breast cancer Maternal Aunt 68        60s    Cancer Maternal Aunt     Stomach cancer Paternal Uncle     Cancer Paternal Uncle     Coronary artery disease Neg Hx     Stroke Neg Hx     Arthritis Neg Hx     Hyperlipidemia Neg Hx      Social History     Tobacco Use    Smoking status: Never    Smokeless tobacco: Never   Vaping Use    Vaping status: Never Used   Substance and Sexual Activity    Alcohol use: No    Drug use: No    Sexual activity: Yes      Partners: Male     Birth control/protection: Post-menopausal     Current Outpatient Medications on File Prior to Visit   Medication Sig    benzonatate (TESSALON PERLES) 100 mg capsule Take 100 mg by mouth Three times daily as needed    busPIRone (BUSPAR) 5 mg tablet Take 1 tablet (5 mg total) by mouth 2 (two) times a day    cholecalciferol (VITAMIN D3) 1,000 units tablet Take 2,000 Units by mouth daily    diazepam (VALIUM) 5 mg tablet Take 1 tablet (5 mg total) by mouth daily as needed for anxiety No driving with this.  No alcohol with this. Do not combine with zolpidem (Patient taking differently: Take 5 mg by mouth daily as needed for anxiety No driving with this.  No alcohol with this. Do not combine with zolpidem as needed)    estradiol (ESTRACE) 0.1 mg/g vaginal cream Insert 1 g into the vagina 2 (two) times a week    fluticasone (FLONASE) 50 mcg/act nasal spray 2 sprays into each nostril daily    Multiple Vitamins-Minerals (CENTRUM SILVER ADULT 50+) TABS Take 1 tablet by mouth daily      ondansetron (ZOFRAN-ODT) 4 mg disintegrating tablet Take 4 mg by mouth every 8 (eight) hours as needed    predniSONE 20 mg tablet Take 20 mg by mouth daily    Probiotic Product (PROBIOTIC COLON SUPPORT) CAPS Take 1 capsule by mouth daily      simvastatin (ZOCOR) 40 mg tablet Take 1 tablet (40 mg total) by mouth daily    Vaginal Lubricant (REPLENS) GEL Insert into the vagina    zolpidem (AMBIEN) 10 mg tablet Take 1 tablet (10 mg total) by mouth daily at bedtime as needed for sleep (Patient taking differently: Take 10 mg by mouth daily at bedtime as needed for sleep As needed)    famotidine (PEPCID) 40 MG tablet Take 1 tablet (40 mg total) by mouth daily (Patient not taking: Reported on 11/5/2024)    LORazepam (ATIVAN) 1 mg tablet Take 1 tablet 1 hour prior to MRI.  May repeat immediately prior if necessary.  DO NOT TAKE WITHIN 24 HOURS OF AMBIEN. (Patient not taking: Reported on 11/5/2024)    methocarbamol (ROBAXIN) 500 mg  tablet Take 1 tablet (500 mg total) by mouth 2 (two) times a day as needed for muscle spasms (Patient not taking: Reported on 12/5/2024)     Allergies   Allergen Reactions    Augmentin [Amoxicillin-Pot Clavulanate] GI Intolerance    Macrobid [Nitrofurantoin] GI Intolerance     Immunization History   Administered Date(s) Administered    COVID-19 PFIZER VACCINE 0.3 ML IM 02/15/2021, 03/07/2021, 11/27/2021     Objective   /100   Pulse 85   Wt 64.4 kg (142 lb)   SpO2 93%   BMI 25.15 kg/m²     Physical Exam  Constitutional:       Appearance: She is ill-appearing.   HENT:      Right Ear: Tympanic membrane and ear canal normal.      Left Ear: Tympanic membrane and ear canal normal.      Mouth/Throat:      Mouth: Oral lesions (Pinpoint papules in the soft palate) present.      Pharynx: No oropharyngeal exudate.   Eyes:      Conjunctiva/sclera: Conjunctivae normal.   Pulmonary:      Effort: Respiratory distress (Severe cough) present.      Breath sounds: Wheezing, rhonchi and rales present.   Abdominal:      Palpations: Abdomen is soft.      Tenderness: There is no abdominal tenderness.   Musculoskeletal:      Cervical back: Neck supple.   Psychiatric:         Mood and Affect: Mood normal.         Behavior: Behavior normal.

## 2024-12-11 NOTE — TELEPHONE ENCOUNTER
Patient is still very sick. She did not forget about calling back. She will call back when she feels better.

## 2024-12-12 ENCOUNTER — HOSPITAL ENCOUNTER (OUTPATIENT)
Dept: CT IMAGING | Facility: HOSPITAL | Age: 69
Discharge: HOME | End: 2024-12-12
Payer: MEDICARE

## 2024-12-12 DIAGNOSIS — R93.89 CHEST X-RAY ABNORMALITY: ICD-10-CM

## 2024-12-12 PROCEDURE — 71250 CT THORAX DX C-: CPT

## 2024-12-18 ENCOUNTER — RESULTS FOLLOW-UP (OUTPATIENT)
Dept: INTERNAL MEDICINE CLINIC | Facility: CLINIC | Age: 69
End: 2024-12-18

## 2025-02-06 ENCOUNTER — RA CDI HCC (OUTPATIENT)
Dept: OTHER | Facility: HOSPITAL | Age: 70
End: 2025-02-06

## 2025-02-13 ENCOUNTER — OFFICE VISIT (OUTPATIENT)
Dept: INTERNAL MEDICINE CLINIC | Facility: CLINIC | Age: 70
End: 2025-02-13
Payer: MEDICARE

## 2025-02-13 VITALS
OXYGEN SATURATION: 97 % | BODY MASS INDEX: 26.26 KG/M2 | SYSTOLIC BLOOD PRESSURE: 128 MMHG | HEIGHT: 63 IN | HEART RATE: 77 BPM | WEIGHT: 148.2 LBS | DIASTOLIC BLOOD PRESSURE: 76 MMHG

## 2025-02-13 DIAGNOSIS — K13.0 MUCOCELE OF LOWER LIP: Primary | ICD-10-CM

## 2025-02-13 DIAGNOSIS — M47.816 LUMBAR SPONDYLOSIS: ICD-10-CM

## 2025-02-13 DIAGNOSIS — R05.3 CHRONIC COUGH: ICD-10-CM

## 2025-02-13 DIAGNOSIS — F41.1 GAD (GENERALIZED ANXIETY DISORDER): ICD-10-CM

## 2025-02-13 DIAGNOSIS — E04.1 THYROID NODULE: ICD-10-CM

## 2025-02-13 DIAGNOSIS — G47.00 INSOMNIA, CONTROLLED: ICD-10-CM

## 2025-02-13 DIAGNOSIS — M53.3 MASS OF SACRUM: ICD-10-CM

## 2025-02-13 DIAGNOSIS — Z00.00 MEDICARE ANNUAL WELLNESS VISIT, SUBSEQUENT: ICD-10-CM

## 2025-02-13 PROCEDURE — G0439 PPPS, SUBSEQ VISIT: HCPCS | Performed by: INTERNAL MEDICINE

## 2025-02-13 PROCEDURE — G2211 COMPLEX E/M VISIT ADD ON: HCPCS | Performed by: INTERNAL MEDICINE

## 2025-02-13 PROCEDURE — 99214 OFFICE O/P EST MOD 30 MIN: CPT | Performed by: INTERNAL MEDICINE

## 2025-02-13 RX ORDER — FAMOTIDINE 40 MG/1
40 TABLET, FILM COATED ORAL DAILY PRN
Start: 2025-02-13

## 2025-02-13 RX ORDER — ZOLPIDEM TARTRATE 10 MG/1
10 TABLET ORAL
Qty: 30 TABLET | Refills: 0 | Status: SHIPPED | OUTPATIENT
Start: 2025-02-13

## 2025-02-13 RX ORDER — DIAZEPAM 5 MG/1
5 TABLET ORAL DAILY PRN
Qty: 30 TABLET | Refills: 0 | Status: SHIPPED | OUTPATIENT
Start: 2025-02-13

## 2025-02-13 NOTE — PATIENT INSTRUCTIONS
Medicare Preventive Visit Patient Instructions  Thank you for completing your Welcome to Medicare Visit or Medicare Annual Wellness Visit today. Your next wellness visit will be due in one year (2/14/2026).  The screening/preventive services that you may require over the next 5-10 years are detailed below. Some tests may not apply to you based off risk factors and/or age. Screening tests ordered at today's visit but not completed yet may show as past due. Also, please note that scanned in results may not display below.  Preventive Screenings:  Service Recommendations Previous Testing/Comments   Colorectal Cancer Screening  * Colonoscopy    * Fecal Occult Blood Test (FOBT)/Fecal Immunochemical Test (FIT)  * Fecal DNA/Cologuard Test  * Flexible Sigmoidoscopy Age: 45-75 years old   Colonoscopy: every 10 years (may be performed more frequently if at higher risk)  OR  FOBT/FIT: every 1 year  OR  Cologuard: every 3 years  OR  Sigmoidoscopy: every 5 years  Screening may be recommended earlier than age 45 if at higher risk for colorectal cancer. Also, an individualized decision between you and your healthcare provider will decide whether screening between the ages of 76-85 would be appropriate. Colonoscopy: 08/27/2015  FOBT/FIT: Not on file  Cologuard: Not on file  Sigmoidoscopy: Not on file    Screening Current     Breast Cancer Screening Age: 40+ years old  Frequency: every 1-2 years  Not required if history of left and right mastectomy Mammogram: 03/25/2024    Screening Current   Cervical Cancer Screening Between the ages of 21-29, pap smear recommended once every 3 years.   Between the ages of 30-65, can perform pap smear with HPV co-testing every 5 years.   Recommendations may differ for women with a history of total hysterectomy, cervical cancer, or abnormal pap smears in past. Pap Smear: 11/05/2024    Screening Not Indicated   Hepatitis C Screening Once for adults born between 1945 and 1965  More frequently in  patients at high risk for Hepatitis C Hep C Antibody: 06/06/2019    Screening Current   Diabetes Screening 1-2 times per year if you're at risk for diabetes or have pre-diabetes Fasting glucose: 99 mg/dL (7/30/2024)  A1C: 5.6 % (7/30/2024)  Screening Current   Cholesterol Screening Once every 5 years if you don't have a lipid disorder. May order more often based on risk factors. Lipid panel: 07/30/2024    Screening Not Indicated  History Lipid Disorder     Other Preventive Screenings Covered by Medicare:  Abdominal Aortic Aneurysm (AAA) Screening: covered once if your at risk. You're considered to be at risk if you have a family history of AAA.  Lung Cancer Screening: covers low dose CT scan once per year if you meet all of the following conditions: (1) Age 55-77; (2) No signs or symptoms of lung cancer; (3) Current smoker or have quit smoking within the last 15 years; (4) You have a tobacco smoking history of at least 20 pack years (packs per day multiplied by number of years you smoked); (5) You get a written order from a healthcare provider.  Glaucoma Screening: covered annually if you're considered high risk: (1) You have diabetes OR (2) Family history of glaucoma OR (3)  aged 50 and older OR (4)  American aged 65 and older  Osteoporosis Screening: covered every 2 years if you meet one of the following conditions: (1) You're estrogen deficient and at risk for osteoporosis based off medical history and other findings; (2) Have a vertebral abnormality; (3) On glucocorticoid therapy for more than 3 months; (4) Have primary hyperparathyroidism; (5) On osteoporosis medications and need to assess response to drug therapy.   Last bone density test (DXA Scan): 11/21/2023.  HIV Screening: covered annually if you're between the age of 15-65. Also covered annually if you are younger than 15 and older than 65 with risk factors for HIV infection. For pregnant patients, it is covered up to 3 times per  pregnancy.    Immunizations:  Immunization Recommendations   Influenza Vaccine Annual influenza vaccination during flu season is recommended for all persons aged >= 6 months who do not have contraindications   Pneumococcal Vaccine   * Pneumococcal conjugate vaccine = PCV13 (Prevnar 13), PCV15 (Vaxneuvance), PCV20 (Prevnar 20)  * Pneumococcal polysaccharide vaccine = PPSV23 (Pneumovax) Adults 19-63 yo with certain risk factors or if 65+ yo  If never received any pneumonia vaccine: recommend Prevnar 20 (PCV20)  Give PCV20 if previously received 1 dose of PCV13 or PPSV23   Hepatitis B Vaccine 3 dose series if at intermediate or high risk (ex: diabetes, end stage renal disease, liver disease)   Respiratory syncytial virus (RSV) Vaccine - COVERED BY MEDICARE PART D  * RSVPreF3 (Arexvy) CDC recommends that adults 60 years of age and older may receive a single dose of RSV vaccine using shared clinical decision-making (SCDM)   Tetanus (Td) Vaccine - COST NOT COVERED BY MEDICARE PART B Following completion of primary series, a booster dose should be given every 10 years to maintain immunity against tetanus. Td may also be given as tetanus wound prophylaxis.   Tdap Vaccine - COST NOT COVERED BY MEDICARE PART B Recommended at least once for all adults. For pregnant patients, recommended with each pregnancy.   Shingles Vaccine (Shingrix) - COST NOT COVERED BY MEDICARE PART B  2 shot series recommended in those 19 years and older who have or will have weakened immune systems or those 50 years and older     Health Maintenance Due:      Topic Date Due   • Colorectal Cancer Screening  08/27/2025   • Breast Cancer Screening: Mammogram  03/25/2026   • Hepatitis C Screening  Completed     Immunizations Due:  There are no preventive care reminders to display for this patient.  Advance Directives   What are advance directives?  Advance directives are legal documents that state your wishes and plans for medical care. These plans are made  ahead of time in case you lose your ability to make decisions for yourself. Advance directives can apply to any medical decision, such as the treatments you want, and if you want to donate organs.   What are the types of advance directives?  There are many types of advance directives, and each state has rules about how to use them. You may choose a combination of any of the following:  Living will:  This is a written record of the treatment you want. You can also choose which treatments you do not want, which to limit, and which to stop at a certain time. This includes surgery, medicine, IV fluid, and tube feedings.   Durable power of  for healthcare (DPAHC):  This is a written record that states who you want to make healthcare choices for you when you are unable to make them for yourself. This person, called a proxy, is usually a family member or a friend. You may choose more than 1 proxy.  Do not resuscitate (DNR) order:  A DNR order is used in case your heart stops beating or you stop breathing. It is a request not to have certain forms of treatment, such as CPR. A DNR order may be included in other types of advance directives.  Medical directive:  This covers the care that you want if you are in a coma, near death, or unable to make decisions for yourself. You can list the treatments you want for each condition. Treatment may include pain medicine, surgery, blood transfusions, dialysis, IV or tube feedings, and a ventilator (breathing machine).  Values history:  This document has questions about your views, beliefs, and how you feel and think about life. This information can help others choose the care that you would choose.  Why are advance directives important?  An advance directive helps you control your care. Although spoken wishes may be used, it is better to have your wishes written down. Spoken wishes can be misunderstood, or not followed. Treatments may be given even if you do not want them. An  advance directive may make it easier for your family to make difficult choices about your care.   Weight Management   Why it is important to manage your weight:  Being overweight increases your risk of health conditions such as heart disease, high blood pressure, type 2 diabetes, and certain types of cancer. It can also increase your risk for osteoarthritis, sleep apnea, and other respiratory problems. Aim for a slow, steady weight loss. Even a small amount of weight loss can lower your risk of health problems.  How to lose weight safely:  A safe and healthy way to lose weight is to eat fewer calories and get regular exercise. You can lose up about 1 pound a week by decreasing the number of calories you eat by 500 calories each day.   Healthy meal plan for weight management:  A healthy meal plan includes a variety of foods, contains fewer calories, and helps you stay healthy. A healthy meal plan includes the following:  Eat whole-grain foods more often.  A healthy meal plan should contain fiber. Fiber is the part of grains, fruits, and vegetables that is not broken down by your body. Whole-grain foods are healthy and provide extra fiber in your diet. Some examples of whole-grain foods are whole-wheat breads and pastas, oatmeal, brown rice, and bulgur.  Eat a variety of vegetables every day.  Include dark, leafy greens such as spinach, kale, naa greens, and mustard greens. Eat yellow and orange vegetables such as carrots, sweet potatoes, and winter squash.   Eat a variety of fruits every day.  Choose fresh or canned fruit (canned in its own juice or light syrup) instead of juice. Fruit juice has very little or no fiber.  Eat low-fat dairy foods.  Drink fat-free (skim) milk or 1% milk. Eat fat-free yogurt and low-fat cottage cheese. Try low-fat cheeses such as mozzarella and other reduced-fat cheeses.  Choose meat and other protein foods that are low in fat.  Choose beans or other legumes such as split peas or  lentils. Choose fish, skinless poultry (chicken or turkey), or lean cuts of red meat (beef or pork). Before you cook meat or poultry, cut off any visible fat.   Use less fat and oil.  Try baking foods instead of frying them. Add less fat, such as margarine, sour cream, regular salad dressing and mayonnaise to foods. Eat fewer high-fat foods. Some examples of high-fat foods include french fries, doughnuts, ice cream, and cakes.  Eat fewer sweets.  Limit foods and drinks that are high in sugar. This includes candy, cookies, regular soda, and sweetened drinks.  Exercise:  Exercise at least 30 minutes per day on most days of the week. Some examples of exercise include walking, biking, dancing, and swimming. You can also fit in more physical activity by taking the stairs instead of the elevator or parking farther away from stores. Ask your healthcare provider about the best exercise plan for you.      © Copyright [employer] 2018 Information is for End User's use only and may not be sold, redistributed or otherwise used for commercial purposes. All illustrations and images included in CareNotes® are the copyrighted property of [employer] or [employer]

## 2025-02-13 NOTE — ASSESSMENT & PLAN NOTE
Started with a respiratory infection  Minimal cough at this point  Lungs clear on CT  She was recommended to do a trial of Pepcid which she has not done  Orders:  •  famotidine (PEPCID) 40 MG tablet; Take 1 tablet (40 mg total) by mouth daily as needed for heartburn

## 2025-02-13 NOTE — PROGRESS NOTES
Name: Matilda Hernandez      : 1955      MRN: [id number]  Encounter Provider: Lea Reyes, MD  Encounter Date: 2025   Encounter department: [facility]    Assessment & Plan  Mucocele of lower lip  Noticed last week, no pain  May be from the parent biting her lower lip  Continue to observe and if it does not resolve, see ENT       Chronic cough  Started with a respiratory infection  Minimal cough at this point  Lungs clear on CT  She was recommended to do a trial of Pepcid which she has not done  Orders:  •  famotidine (PEPCID) 40 MG tablet; Take 1 tablet (40 mg total) by mouth daily as needed for heartburn    PROSPER (generalized anxiety disorder)  Taking BuSpar 5 mg daily, higher dose did not make her feel well  Discussed considering switching to Lexapro but not ready at this time  Orders:  •  diazepam (VALIUM) 5 mg tablet; Take 1 tablet (5 mg total) by mouth daily as needed for anxiety No driving with this.  No alcohol with this. Do not combine with zolpidem    Insomnia, controlled    Orders:  •  zolpidem (AMBIEN) 10 mg tablet; Take 1 tablet (10 mg total) by mouth daily at bedtime as needed for sleep    Medicare annual wellness visit, subsequent         Lumbar spondylosis  She has had a number of treatments from pain management  The pain is in the right lower back but she also points to the hip  There is no significant degenerative change in the hip XR  ?bursitis  F/U with pain mgt       Thyroid nodule  US scheduled       Mass of sacrum  Lesion in S3 and repeat MRI in 6-12 months suggested  She can call to schedule          Preventive health issues were discussed with patient, and age appropriate screening tests were ordered as noted in patient's After Visit Summary. Personalized health advice and appropriate referrals for health education or preventive services given if needed, as noted in patient's After Visit Summary.    History of Present Illness     HPI   Patient Care  Team:  Lea Reyes, MD as PCP - General (Internal Medicine)  MD Cameron Bedoya MD (Otolaryngology)    Review of Systems   Constitutional:  Negative for unexpected weight change.   Respiratory:  Negative for shortness of breath.    Cardiovascular:  Negative for chest pain and palpitations.   Gastrointestinal:  Negative for abdominal pain, constipation and diarrhea.   Musculoskeletal:  Positive for back pain.   Psychiatric/Behavioral:  The patient is nervous/anxious.      Medical History Reviewed by provider this encounter:  Tobacco  Allergies  Meds  Problems  Med Hx  Surg Hx  Fam Hx       Annual Wellness Visit Questionnaire   Matilda is here for her Subsequent Wellness visit.     Health Risk Assessment:   Patient rates overall health as very good. Patient feels that their physical health rating is same. Patient is very satisfied with their life. Eyesight was rated as same. Hearing was rated as same. Patient feels that their emotional and mental health rating is same. Patients states they are never, rarely angry. Patient states they are never, rarely unusually tired/fatigued. Pain experienced in the last 7 days has been some. Patient's pain rating has been 4/10. Patient states that she has experienced no weight loss or gain in last 6 months.     Depression Screening:   PHQ-2 Score: 0      Fall Risk Screening:   In the past year, patient has experienced: no history of falling in past year      Urinary Incontinence Screening:   Patient has not leaked urine accidently in the last six months.     Home Safety:  Patient does not have trouble with stairs inside or outside of their home. Patient has working smoke alarms and has working carbon monoxide detector. Home safety hazards include: none.     Nutrition:   Current diet is Regular.     Medications:   Patient is currently taking over-the-counter supplements. OTC medications include: see medication list. Patient is able to manage medications.      Activities of Daily Living (ADLs)/Instrumental Activities of Daily Living (IADLs):   Walk and transfer into and out of bed and chair?: Yes  Dress and groom yourself?: Yes    Bathe or shower yourself?: Yes    Feed yourself? Yes  Do your laundry/housekeeping?: Yes  Manage your money, pay your bills and track your expenses?: Yes  Make your own meals?: Yes    Do your own shopping?: Yes    Previous Hospitalizations:   Any hospitalizations or ED visits within the last 12 months?: No      Advance Care Planning:   Living will: No    Durable POA for healthcare: Yes    Advanced directive: No      Cognitive Screening:   Provider or family/friend/caregiver concerned regarding cognition?: No    PREVENTIVE SCREENINGS      Cardiovascular Screening:    General: Screening Not Indicated and History Lipid Disorder      Diabetes Screening:     General: Screening Current      Colorectal Cancer Screening:     General: Screening Current      Breast Cancer Screening:     General: Screening Current      Cervical Cancer Screening:    General: Screening Not Indicated      Osteoporosis Screening:    General: Screening Not Indicated and History Osteoporosis      Abdominal Aortic Aneurysm (AAA) Screening:        General: Screening Not Indicated      Lung Cancer Screening:     General: Screening Not Indicated      Hepatitis C Screening:    General: Screening Current    Screening, Brief Intervention, and Referral to Treatment (SBIRT)     Screening  Typical number of drinks in a day: 0  Typical number of drinks in a week: 0  Interpretation: Low risk drinking behavior.    AUDIT-C Screenin) How often did you have a drink containing alcohol in the past year? never  2) How many drinks did you have on a typical day when you were drinking in the past year? 0  3) How often did you have 6 or more drinks on one occasion in the past year? never    AUDIT-C Score: 0  Interpretation: Score 0-2 (female): Negative screen for alcohol misuse    Single Item  "Drug Screening:  How often have you used an illegal drug (including marijuana) or a prescription medication for non-medical reasons in the past year? never    Single Item Drug Screen Score: 0  Interpretation: Negative screen for possible drug use disorder    Social Drivers of Health     Financial Resource Strain: Low Risk  (1/23/2024)    Overall Financial Resource Strain (CARDIA)    • Difficulty of Paying Living Expenses: Not hard at all   Food Insecurity: No Food Insecurity (2/8/2025)    Hunger Vital Sign    • Worried About Running Out of Food in the Last Year: Never true    • Ran Out of Food in the Last Year: Never true   Transportation Needs: No Transportation Needs (2/8/2025)    PRAPARE - Transportation    • Lack of Transportation (Medical): No    • Lack of Transportation (Non-Medical): No   Housing Stability: Low Risk  (2/13/2025)    Housing Stability Vital Sign    • Unable to Pay for Housing in the Last Year: No    • Number of Times Moved in the Last Year: 0    • Homeless in the Last Year: No   Utilities: Not At Risk (2/8/2025)    [facility] Utilities    • Threatened with loss of utilities: No     No results found.    Objective   /76 (BP Location: Left arm, Patient Position: Sitting, Cuff Size: Standard)   Pulse 77   Ht 5' 3\" (1.6 m)   Wt 67.2 kg (148 lb 3.2 oz)   SpO2 97%   BMI 26.25 kg/m²     Physical Exam  Constitutional:       General: She is not in acute distress.     Appearance: She is well-developed. She is not ill-appearing, toxic-appearing or diaphoretic.   HENT:      Mouth/Throat:      Lips: Lesions (small cystic growth middle of the lower lip) present.   Eyes:      Conjunctiva/sclera: Conjunctivae normal.   Cardiovascular:      Rate and Rhythm: Normal rate and regular rhythm.      Heart sounds: Normal heart sounds. No murmur heard.  Pulmonary:      Effort: Pulmonary effort is normal. No respiratory distress.      Breath sounds: Normal breath sounds. No stridor. No wheezing or rales.   Abdominal: "      General: There is no distension.      Palpations: Abdomen is soft. There is no mass.      Tenderness: There is no abdominal tenderness. There is no guarding or rebound.   Musculoskeletal:      Cervical back: Neck supple.      Right lower leg: No edema.      Left lower leg: No edema.   Neurological:      Mental Status: She is alert and oriented to person, place, and time.   Psychiatric:         Mood and Affect: Mood normal.         Behavior: Behavior normal.         Thought Content: Thought content normal.         Judgment: Judgment normal.

## 2025-02-13 NOTE — ASSESSMENT & PLAN NOTE
She has had a number of treatments from pain management  The pain is in the right lower back but she also points to the hip  There is no significant degenerative change in the hip XR  ?bursitis  F/U with pain mgt

## 2025-02-13 NOTE — ASSESSMENT & PLAN NOTE
Taking BuSpar 5 mg daily, higher dose did not make her feel well  Discussed considering switching to Lexapro but not ready at this time  Orders:  •  diazepam (VALIUM) 5 mg tablet; Take 1 tablet (5 mg total) by mouth daily as needed for anxiety No driving with this.  No alcohol with this. Do not combine with zolpidem

## 2025-02-14 ENCOUNTER — TELEPHONE (OUTPATIENT)
Dept: ADMINISTRATIVE | Facility: OTHER | Age: 70
End: 2025-02-14

## 2025-02-14 NOTE — LETTER
Procedure Request Form: Colonoscopy      Date Requested: 25  Patient: Matilda Hernandez  Patient : 1955   Referring Provider: Lea Reyes, MD        Date of Procedure ______________________________       The above patient has informed us that they have completed their   most recent Colonoscopy at your facility. Please complete   this form and attach all corresponding procedure reports/results.    Comments __________________________________________________________  ____________________________________________________________________  ____________________________________________________________________  ____________________________________________________________________    Facility Completing Procedure _________________________________________    Form Completed By (print name) _______________________________________      Signature __________________________________________________________      These reports are needed for  compliance.    Please fax this completed form and a copy of the procedure report to our office located at [address] [place] [address] as soon as possible to Fax [phone] adama Jean: Phone [phone]    We thank you for your assistance in treating our mutual patient.

## 2025-02-14 NOTE — TELEPHONE ENCOUNTER
----- Message from Lea Reyes, MD sent at 2/13/2025  9:59 AM EST -----  02/13/25 9:59 AM    Syd, our patient Matilda Hernandez has had CRC: Colonoscopy completed/performed. Please assist in updating the patient chart by pulling the document from the Media Tab. The date of service is Jan 2023.     Thank you,  Lea Reyes, MD  [facility]

## 2025-02-14 NOTE — TELEPHONE ENCOUNTER
Upon review of the In Basket request and the patient's chart, initial outreach has been made via fax to facility. Please see Contacts section for details.     Thank you  Jean Desouza MA

## 2025-02-14 NOTE — LETTER
Procedure Request Form: Colonoscopy      Date Requested: 25  Patient: Matilda Hernandez  Patient : 1955   Referring Provider: Lea Reyes, MD        Date of Procedure ______________________________       The above patient has informed us that they have completed their   most recent Colonoscopy at your facility. Please complete   this form and attach all corresponding procedure reports/results.    Comments __________________________________________________________  ____________________________________________________________________  ____________________________________________________________________  ____________________________________________________________________    Facility Completing Procedure _________________________________________    Form Completed By (print name) _______________________________________      Signature __________________________________________________________      These reports are needed for  compliance.    Please fax this completed form and a copy of the procedure report to our office located at [address] [place] [address] as soon as possible to Fax [phone] adama Jean: Phone [phone]    We thank you for your assistance in treating our mutual patient.

## 2025-02-17 NOTE — TELEPHONE ENCOUNTER
Upon review of the In Basket request we were able to locate, review, and update the patient chart as requested for CRC: Colonoscopy.    Any additional questions or concerns should be emailed to the Practice Liaisons via the appropriate education email address, please do not reply via In Basket.    Thank you  Jean Desouza MA   [facility]

## 2025-02-17 NOTE — TELEPHONE ENCOUNTER
As a follow-up, a second attempt has been made for outreach via fax to facility. Please see Contacts section for details.    Thank you  Jean Desouza MA

## 2025-02-19 ENCOUNTER — APPOINTMENT (OUTPATIENT)
Dept: LAB | Facility: CLINIC | Age: 70
End: 2025-02-19
Payer: MEDICARE

## 2025-02-19 DIAGNOSIS — E78.2 MIXED HYPERLIPIDEMIA: ICD-10-CM

## 2025-02-19 DIAGNOSIS — M81.0 AGE-RELATED OSTEOPOROSIS WITHOUT CURRENT PATHOLOGICAL FRACTURE: ICD-10-CM

## 2025-02-19 DIAGNOSIS — R73.01 IMPAIRED FASTING BLOOD SUGAR: ICD-10-CM

## 2025-02-19 LAB
ALBUMIN SERPL BCG-MCNC: 4.4 G/DL (ref 3.5–5)
ALP SERPL-CCNC: 64 U/L (ref 34–104)
ALT SERPL W P-5'-P-CCNC: 15 U/L (ref 7–52)
ANION GAP SERPL CALCULATED.3IONS-SCNC: 11 MMOL/L (ref 4–13)
AST SERPL W P-5'-P-CCNC: 16 U/L (ref 13–39)
BASOPHILS # BLD AUTO: 0.05 THOUSANDS/ΜL (ref 0–0.1)
BASOPHILS NFR BLD AUTO: 1 % (ref 0–1)
BILIRUB SERPL-MCNC: 0.74 MG/DL (ref 0.2–1)
BUN SERPL-MCNC: 27 MG/DL (ref 5–25)
CALCIUM SERPL-MCNC: 10 MG/DL (ref 8.4–10.2)
CHLORIDE SERPL-SCNC: 103 MMOL/L (ref 96–108)
CHOLEST SERPL-MCNC: 201 MG/DL (ref ?–200)
CO2 SERPL-SCNC: 26 MMOL/L (ref 21–32)
CREAT SERPL-MCNC: 0.82 MG/DL (ref 0.6–1.3)
EOSINOPHIL # BLD AUTO: 0.27 THOUSAND/ΜL (ref 0–0.61)
EOSINOPHIL NFR BLD AUTO: 3 % (ref 0–6)
ERYTHROCYTE [DISTWIDTH] IN BLOOD BY AUTOMATED COUNT: 13.3 % (ref 11.6–15.1)
EST. AVERAGE GLUCOSE BLD GHB EST-MCNC: 111 MG/DL
GFR SERPL CREATININE-BSD FRML MDRD: 72 ML/MIN/1.73SQ M
GLUCOSE P FAST SERPL-MCNC: 105 MG/DL (ref 65–99)
HBA1C MFR BLD: 5.5 %
HCT VFR BLD AUTO: 43.2 % (ref 34.8–46.1)
HDLC SERPL-MCNC: 60 MG/DL
HGB BLD-MCNC: 13.9 G/DL (ref 11.5–15.4)
IMM GRANULOCYTES # BLD AUTO: 0.02 THOUSAND/UL (ref 0–0.2)
IMM GRANULOCYTES NFR BLD AUTO: 0 % (ref 0–2)
LDLC SERPL CALC-MCNC: 110 MG/DL (ref 0–100)
LYMPHOCYTES # BLD AUTO: 3.22 THOUSANDS/ΜL (ref 0.6–4.47)
LYMPHOCYTES NFR BLD AUTO: 36 % (ref 14–44)
MCH RBC QN AUTO: 29.4 PG (ref 26.8–34.3)
MCHC RBC AUTO-ENTMCNC: 32.2 G/DL (ref 31.4–37.4)
MCV RBC AUTO: 92 FL (ref 82–98)
MONOCYTES # BLD AUTO: 0.74 THOUSAND/ΜL (ref 0.17–1.22)
MONOCYTES NFR BLD AUTO: 8 % (ref 4–12)
NEUTROPHILS # BLD AUTO: 4.67 THOUSANDS/ΜL (ref 1.85–7.62)
NEUTS SEG NFR BLD AUTO: 52 % (ref 43–75)
NONHDLC SERPL-MCNC: 141 MG/DL
NRBC BLD AUTO-RTO: 0 /100 WBCS
PLATELET # BLD AUTO: 310 THOUSANDS/UL (ref 149–390)
PMV BLD AUTO: 10.1 FL (ref 8.9–12.7)
POTASSIUM SERPL-SCNC: 4.1 MMOL/L (ref 3.5–5.3)
PROT SERPL-MCNC: 7 G/DL (ref 6.4–8.4)
RBC # BLD AUTO: 4.72 MILLION/UL (ref 3.81–5.12)
SODIUM SERPL-SCNC: 140 MMOL/L (ref 135–147)
TRIGL SERPL-MCNC: 154 MG/DL (ref ?–150)
WBC # BLD AUTO: 8.97 THOUSAND/UL (ref 4.31–10.16)

## 2025-02-19 PROCEDURE — 36415 COLL VENOUS BLD VENIPUNCTURE: CPT

## 2025-02-19 PROCEDURE — 85025 COMPLETE CBC W/AUTO DIFF WBC: CPT

## 2025-02-19 PROCEDURE — 80061 LIPID PANEL: CPT

## 2025-02-19 PROCEDURE — 83036 HEMOGLOBIN GLYCOSYLATED A1C: CPT

## 2025-02-19 PROCEDURE — 80053 COMPREHEN METABOLIC PANEL: CPT

## 2025-02-20 ENCOUNTER — HOSPITAL ENCOUNTER (OUTPATIENT)
Dept: ULTRASOUND IMAGING | Facility: HOSPITAL | Age: 70
Discharge: HOME | End: 2025-02-20
Payer: MEDICARE

## 2025-02-20 DIAGNOSIS — E04.2 MULTIPLE THYROID NODULES: ICD-10-CM

## 2025-02-20 PROCEDURE — 76536 US EXAM OF HEAD AND NECK: CPT

## 2025-02-23 ENCOUNTER — RESULTS FOLLOW-UP (OUTPATIENT)
Dept: INTERNAL MEDICINE CLINIC | Facility: CLINIC | Age: 70
End: 2025-02-23

## 2025-02-23 DIAGNOSIS — E78.2 MIXED HYPERLIPIDEMIA: Primary | ICD-10-CM

## 2025-02-26 DIAGNOSIS — E78.2 MIXED HYPERLIPIDEMIA: Primary | ICD-10-CM

## 2025-03-09 ENCOUNTER — PATIENT MESSAGE (OUTPATIENT)
Dept: INTERNAL MEDICINE CLINIC | Facility: CLINIC | Age: 70
End: 2025-03-09

## 2025-03-12 NOTE — PATIENT COMMUNICATION
Patient called back to follow up on message and that if two boxes of  transderm scop patch 1.5mg could be ordered for both her and . One box for each. Patient would like a call back, thank you.

## 2025-03-13 DIAGNOSIS — F41.1 GAD (GENERALIZED ANXIETY DISORDER): Primary | ICD-10-CM

## 2025-03-13 RX ORDER — ESCITALOPRAM OXALATE 5 MG/1
5 TABLET ORAL DAILY
Qty: 30 TABLET | Refills: 2 | Status: SHIPPED | OUTPATIENT
Start: 2025-03-13

## 2025-03-14 DIAGNOSIS — T75.3XXA SEA SICKNESS, INITIAL ENCOUNTER: ICD-10-CM

## 2025-03-14 RX ORDER — SCOPOLAMINE 1 MG/3D
1 PATCH, EXTENDED RELEASE TRANSDERMAL
Qty: 10 PATCH | Refills: 0 | Status: SHIPPED | OUTPATIENT
Start: 2025-03-14

## 2025-03-24 ENCOUNTER — TELEPHONE (OUTPATIENT)
Age: 70
End: 2025-03-24

## 2025-03-24 DIAGNOSIS — E78.01 FAMILIAL HYPERCHOLESTEROLEMIA: ICD-10-CM

## 2025-03-24 NOTE — TELEPHONE ENCOUNTER
The patient called to report on her Last visit with Dr. Reyes she recommend to repeat the MRI but she call to schedule the test and is no order available

## 2025-03-25 RX ORDER — SIMVASTATIN 40 MG
40 TABLET ORAL DAILY
Qty: 90 TABLET | Refills: 1 | Status: SHIPPED | OUTPATIENT
Start: 2025-03-25

## 2025-03-25 NOTE — TELEPHONE ENCOUNTER
There is an active order for an MRI of the sacrum due  in June which is 6months from the MRI of her lumbar spine. She should be able to schedule the MRI for [facility]

## 2025-03-26 ENCOUNTER — HOSPITAL ENCOUNTER (OUTPATIENT)
Dept: MAMMOGRAPHY | Facility: HOSPITAL | Age: 70
Discharge: HOME | End: 2025-03-26
Payer: MEDICARE

## 2025-03-26 VITALS — HEIGHT: 63 IN | BODY MASS INDEX: 26.22 KG/M2 | WEIGHT: 148 LBS

## 2025-03-26 DIAGNOSIS — Z12.31 VISIT FOR SCREENING MAMMOGRAM: ICD-10-CM

## 2025-03-26 PROCEDURE — 77063 BREAST TOMOSYNTHESIS BI: CPT

## 2025-03-26 PROCEDURE — 77067 SCR MAMMO BI INCL CAD: CPT

## 2025-04-25 ENCOUNTER — PATIENT MESSAGE (OUTPATIENT)
Dept: INTERNAL MEDICINE CLINIC | Facility: CLINIC | Age: 70
End: 2025-04-25

## 2025-04-25 DIAGNOSIS — F41.1 GAD (GENERALIZED ANXIETY DISORDER): Primary | ICD-10-CM

## 2025-04-25 RX ORDER — ESCITALOPRAM OXALATE 10 MG/1
10 TABLET ORAL DAILY
Qty: 30 TABLET | Refills: 5 | Status: SHIPPED | OUTPATIENT
Start: 2025-04-25 | End: 2025-10-22

## 2025-05-01 ENCOUNTER — OFFICE VISIT (OUTPATIENT)
Dept: SURGICAL ONCOLOGY | Facility: CLINIC | Age: 70
End: 2025-05-01
Payer: MEDICARE

## 2025-05-01 VITALS
HEART RATE: 80 BPM | BODY MASS INDEX: 26.05 KG/M2 | SYSTOLIC BLOOD PRESSURE: 136 MMHG | RESPIRATION RATE: 16 BRPM | OXYGEN SATURATION: 96 % | WEIGHT: 147 LBS | DIASTOLIC BLOOD PRESSURE: 80 MMHG | HEIGHT: 63 IN | TEMPERATURE: 97.9 F

## 2025-05-01 DIAGNOSIS — Z12.39 BREAST CANCER SCREENING OTHER THAN MAMMOGRAM: ICD-10-CM

## 2025-05-01 DIAGNOSIS — Z12.31 VISIT FOR SCREENING MAMMOGRAM: Primary | ICD-10-CM

## 2025-05-01 DIAGNOSIS — N60.11 FIBROCYSTIC BREAST CHANGES, RIGHT: ICD-10-CM

## 2025-05-01 DIAGNOSIS — Z91.89 INCREASED RISK OF BREAST CANCER: ICD-10-CM

## 2025-05-01 PROCEDURE — G2211 COMPLEX E/M VISIT ADD ON: HCPCS

## 2025-05-01 PROCEDURE — 99213 OFFICE O/P EST LOW 20 MIN: CPT

## 2025-05-01 NOTE — ASSESSMENT & PLAN NOTE
There are no concerning findings on exam today.  The patient would like to discontinue MRI, and would prefer ultrasound instead.  I think this is reasonable given her family history and elevated risk. We will plan for ABUS to be performed with her mammogram next year, and I will continue to see her annually for clinical breast exams.  Orders:  •  US breast screening bilateral complete (ABUS); Future

## 2025-05-01 NOTE — PROGRESS NOTES
Name: Matilda Hernandez      : 1955      MRN: [id number]  Encounter Provider: MYLA Robles  Encounter Date: 2025   Encounter department: [facility]  :  Assessment & Plan  Visit for screening mammogram    Orders:  •  Mammo screening bilateral w 3d and cad; Future    Breast cancer screening other than mammogram    Orders:  •  US breast screening bilateral complete (ABUS); Future    Increased risk of breast cancer  There are no concerning findings on exam today.  The patient would like to discontinue MRI, and would prefer ultrasound instead.  I think this is reasonable given her family history and elevated risk. We will plan for ABUS to be performed with her mammogram next year, and I will continue to see her annually for clinical breast exams.  Orders:  •  US breast screening bilateral complete (ABUS); Future    Fibrocystic breast changes, right    Orders:  •  US breast screening bilateral complete (ABUS); Future      History of Present Illness   Matilda Hernandez is a 69 y.o. year old female who presents today for high risk breast surveillance.  She denies any new findings on self-breast exam, but remains anxious due to her family history. She states she recently switched to Lexapro and discontinued Buspar, and has not noticed any further nipple discharge. She states she had significant difficulty with the last breast MRI because she has poor venous access, and does not want any more MRI's for this reason. However, she would like to have screening ultrasounds if possible.  Mammogram was performed on , BIRADS-2, category b density.  We did discuss possible genetic testing again today, but she has decided not to pursue it.      Review of Systems A complete review of systems is negative other than that noted above in the HPI.         Objective   /80 (BP Location: Left arm, Patient Position: Sitting, Cuff Size: Standard)   Pulse 80   Temp 97.9  "°F (36.6 °C) (Temporal)   Resp 16   Ht 5' 3\" (1.6 m)   Wt 66.7 kg (147 lb)   SpO2 96%   BMI 26.04 kg/m²     Pain Screening:  Pain Score: 0-No pain  ECOG    Physical Exam  Vitals reviewed.   Constitutional:       General: She is not in acute distress.     Appearance: Normal appearance. She is not ill-appearing or toxic-appearing.   HENT:      Head: Normocephalic and atraumatic.   Eyes:      General: No scleral icterus.  Cardiovascular:      Rate and Rhythm: Normal rate.   Pulmonary:      Effort: Pulmonary effort is normal.   Chest:   Breasts:     Right: Normal.      Left: Normal.      Comments: Breasts are smooth and symmetric bilaterally. There are no dominant masses, nodules, skin changes or tenderness on exam. I do not appreciate any adenopathy.  Musculoskeletal:         General: Normal range of motion.      Cervical back: Normal range of motion and neck supple.   Lymphadenopathy:      Cervical: No cervical adenopathy.      Upper Body:      Right upper body: No supraclavicular, axillary or pectoral adenopathy.      Left upper body: No supraclavicular, axillary or pectoral adenopathy.   Skin:     General: Skin is warm and dry.   Neurological:      General: No focal deficit present.      Mental Status: She is alert and oriented to person, place, and time.   Psychiatric:         Mood and Affect: Mood is anxious.         Behavior: Behavior normal.         Thought Content: Thought content normal.         Judgment: Judgment normal.            Radiology Results Review: I have reviewed radiology reports from 3/26/2025 including: bilateral 3D screening mammogram.    Mammo screening bilateral w 3d & cad    Narrative & Impression   DIAGNOSIS: Visit for screening mammogram      TECHNIQUE:  Digital screening mammography was performed. Computer Aided Detection (CAD) analyzed all applicable images.   COMPARISONS: Multiple prior exams dated between 11/29/2006 and 3/25/2024.     RELEVANT HISTORY:   Family Breast Cancer " History: History of breast cancer in Sister, Maternal Grandmother, Maternal Aunt.  Family Medical History: Family medical history includes breast cancer in 3 relatives (maternal aunt, maternal grandmother, sister).   Personal History: Hormone history includes birth control and estrogen replacement therapy. Surgical history includes breast biopsy, hysterectomy, and oophorectomy. No known relevant medical history.     The patient is scheduled in a reminder system for screening mammography.     8-10% of cancers will be missed on mammography. Management of a palpable abnormality must be based on clinical grounds.  Patients will be notified of their results via letter from our facility. Accredited by [employer] and FDA.     RISK ASSESSMENT:   5 Year Tyrer-Cuzick: 2.64%  10 Year Tyrer-Cuzick: 5.57%  Lifetime Tyrer-Cuzick: 8.11%     TISSUE DENSITY:   There are scattered areas of fibroglandular density.      INDICATION: Matilda Hernandez is a 69 y.o. female presenting for screening mammography.     FINDINGS:   Bilateral  There are no suspicious masses, grouped microcalcifications or areas of unexplained architectural distortion. The skin and nipple areolar complex are unremarkable.  A few diffusely distributed calcifications are noted in each breast.  Biopsy marker clips are noted in the right breast.     IMPRESSION:  No mammographic evidence of malignancy.     ASSESSMENT/BI-RADS CATEGORY:  Left: 2 - Benign  Right: 2 - Benign  Overall: 2 - Benign     RECOMMENDATION:       - Routine screening mammogram in 1 year for both breasts.

## 2025-05-29 ENCOUNTER — HOSPITAL ENCOUNTER (OUTPATIENT)
Dept: MRI IMAGING | Facility: HOSPITAL | Age: 70
Discharge: HOME | End: 2025-05-29
Payer: MEDICARE

## 2025-05-29 DIAGNOSIS — M53.3 MASS OF SACRUM: ICD-10-CM

## 2025-05-29 PROCEDURE — 72195 MRI PELVIS W/O DYE: CPT

## 2025-08-11 ENCOUNTER — APPOINTMENT (OUTPATIENT)
Dept: LAB | Facility: CLINIC | Age: 70
End: 2025-08-11
Payer: MEDICARE

## 2025-08-19 ENCOUNTER — OFFICE VISIT (OUTPATIENT)
Dept: INTERNAL MEDICINE CLINIC | Facility: CLINIC | Age: 70
End: 2025-08-19
Payer: MEDICARE

## 2025-08-19 VITALS
SYSTOLIC BLOOD PRESSURE: 144 MMHG | DIASTOLIC BLOOD PRESSURE: 80 MMHG | WEIGHT: 150 LBS | HEART RATE: 73 BPM | OXYGEN SATURATION: 95 % | BODY MASS INDEX: 26.58 KG/M2 | HEIGHT: 63 IN

## 2025-08-19 DIAGNOSIS — R05.3 CHRONIC COUGH: ICD-10-CM

## 2025-08-19 DIAGNOSIS — M51.16 INTERVERTEBRAL DISC DISORDER WITH RADICULOPATHY OF LUMBAR REGION: ICD-10-CM

## 2025-08-19 DIAGNOSIS — F41.1 GAD (GENERALIZED ANXIETY DISORDER): Primary | ICD-10-CM

## 2025-08-19 DIAGNOSIS — E78.2 MIXED HYPERLIPIDEMIA: ICD-10-CM

## 2025-08-19 PROCEDURE — G2211 COMPLEX E/M VISIT ADD ON: HCPCS | Performed by: INTERNAL MEDICINE

## 2025-08-19 PROCEDURE — 99214 OFFICE O/P EST MOD 30 MIN: CPT | Performed by: INTERNAL MEDICINE

## 2025-08-19 RX ORDER — ESCITALOPRAM OXALATE 20 MG/1
20 TABLET ORAL DAILY
Qty: 30 TABLET | Refills: 5 | Status: SHIPPED | OUTPATIENT
Start: 2025-08-19

## 2025-08-19 RX ORDER — FAMOTIDINE 40 MG/1
40 TABLET, FILM COATED ORAL DAILY
Qty: 30 TABLET | Refills: 5 | Status: SHIPPED | OUTPATIENT
Start: 2025-08-19

## 2025-08-19 RX ORDER — DIAZEPAM 5 MG/1
5 TABLET ORAL DAILY PRN
Qty: 30 TABLET | Refills: 0 | Status: SHIPPED | OUTPATIENT
Start: 2025-08-19